# Patient Record
Sex: FEMALE | Race: WHITE | Employment: UNEMPLOYED | URBAN - METROPOLITAN AREA
[De-identification: names, ages, dates, MRNs, and addresses within clinical notes are randomized per-mention and may not be internally consistent; named-entity substitution may affect disease eponyms.]

---
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
## 2017-03-09 ENCOUNTER — HOSPITAL ENCOUNTER (EMERGENCY)
Age: 52
Discharge: HOME | End: 2017-03-10
Attending: EMERGENCY MEDICINE
Payer: SELF-PAY

## 2017-03-09 DIAGNOSIS — M25.512 PAIN IN JOINT OF LEFT SHOULDER: ICD-10-CM

## 2017-03-09 DIAGNOSIS — S40.012A CONTUSION OF LEFT SHOULDER, INITIAL ENCOUNTER: Primary | ICD-10-CM

## 2017-03-09 PROCEDURE — 99283 EMERGENCY DEPT VISIT LOW MDM: CPT

## 2017-03-10 ENCOUNTER — APPOINTMENT (OUTPATIENT)
Dept: GENERAL RADIOLOGY | Age: 52
End: 2017-03-10
Attending: EMERGENCY MEDICINE
Payer: SELF-PAY

## 2017-03-10 ENCOUNTER — APPOINTMENT (OUTPATIENT)
Dept: GENERAL RADIOLOGY | Age: 52
End: 2017-03-10
Attending: PHYSICIAN ASSISTANT
Payer: SELF-PAY

## 2017-03-10 VITALS
SYSTOLIC BLOOD PRESSURE: 104 MMHG | DIASTOLIC BLOOD PRESSURE: 73 MMHG | OXYGEN SATURATION: 100 % | HEIGHT: 70 IN | WEIGHT: 172.62 LBS | RESPIRATION RATE: 17 BRPM | TEMPERATURE: 97.3 F | HEART RATE: 84 BPM | BODY MASS INDEX: 24.71 KG/M2

## 2017-03-10 PROCEDURE — 72050 X-RAY EXAM NECK SPINE 4/5VWS: CPT

## 2017-03-10 PROCEDURE — 73030 X-RAY EXAM OF SHOULDER: CPT

## 2017-03-10 PROCEDURE — 74011250637 HC RX REV CODE- 250/637: Performed by: EMERGENCY MEDICINE

## 2017-03-10 RX ORDER — OXYCODONE AND ACETAMINOPHEN 5; 325 MG/1; MG/1
2 TABLET ORAL
Status: COMPLETED | OUTPATIENT
Start: 2017-03-10 | End: 2017-03-10

## 2017-03-10 RX ORDER — OXYCODONE AND ACETAMINOPHEN 5; 325 MG/1; MG/1
TABLET ORAL
Status: DISCONTINUED
Start: 2017-03-10 | End: 2017-03-10 | Stop reason: HOSPADM

## 2017-03-10 RX ORDER — DIAZEPAM 5 MG/1
5 TABLET ORAL
Qty: 5 TAB | Refills: 0 | Status: SHIPPED | OUTPATIENT
Start: 2017-03-10 | End: 2019-01-18 | Stop reason: ALTCHOICE

## 2017-03-10 RX ORDER — IBUPROFEN 600 MG/1
600 TABLET ORAL
Qty: 30 TAB | Refills: 0 | Status: SHIPPED | OUTPATIENT
Start: 2017-03-10 | End: 2019-01-18 | Stop reason: ALTCHOICE

## 2017-03-10 RX ORDER — DIAZEPAM 5 MG/1
5 TABLET ORAL
Status: COMPLETED | OUTPATIENT
Start: 2017-03-10 | End: 2017-03-10

## 2017-03-10 RX ORDER — OXYCODONE AND ACETAMINOPHEN 5; 325 MG/1; MG/1
1 TABLET ORAL
Qty: 12 TAB | Refills: 0 | Status: SHIPPED | OUTPATIENT
Start: 2017-03-10 | End: 2017-03-23

## 2017-03-10 RX ADMIN — OXYCODONE HYDROCHLORIDE AND ACETAMINOPHEN 2 TABLET: 5; 325 TABLET ORAL at 01:15

## 2017-03-10 RX ADMIN — DIAZEPAM 5 MG: 5 TABLET ORAL at 01:34

## 2017-03-10 NOTE — DISCHARGE INSTRUCTIONS
Joint Pain: Care Instructions  Your Care Instructions  Many people have small aches and pains from overuse or injury to muscles and joints. Joint injuries often happen during sports or recreation, work tasks, or projects around the home. An overuse injury can happen when you put too much stress on a joint or when you do an activity that stresses the joint over and over, such as using the computer or rowing a boat. You can take action at home to help your muscles and joints get better. You should feel better in 1 to 2 weeks, but it can take 3 months or more to heal completely. Follow-up care is a key part of your treatment and safety. Be sure to make and go to all appointments, and call your doctor if you are having problems. It's also a good idea to know your test results and keep a list of the medicines you take. How can you care for yourself at home? · Do not put weight on the injured joint for at least a day or two. · For the first day or two after an injury, do not take hot showers or baths, and do not use hot packs. The heat could make swelling worse. · Put ice or a cold pack on the sore joint for 10 to 20 minutes at a time. Try to do this every 1 to 2 hours for the next 3 days (when you are awake) or until the swelling goes down. Put a thin cloth between the ice and your skin. · Wrap the injury in an elastic bandage. Do not wrap it too tightly because this can cause more swelling. · Prop up the sore joint on a pillow when you ice it or anytime you sit or lie down during the next 3 days. Try to keep it above the level of your heart. This will help reduce swelling. · Take an over-the-counter pain medicine, such as acetaminophen (Tylenol), ibuprofen (Advil, Motrin), or naproxen (Aleve). Read and follow all instructions on the label. · After 1 or 2 days of rest, begin moving the joint gently.  While the joint is still healing, you can begin to exercise using activities that do not strain or hurt the painful joint. When should you call for help? Call your doctor now or seek immediate medical care if:  · You have signs of infection, such as:  ¨ Increased pain, swelling, warmth, and redness. ¨ Red streaks leading from the joint. ¨ A fever. Watch closely for changes in your health, and be sure to contact your doctor if:  · Your movement or symptoms are not getting better after 1 to 2 weeks of home treatment. Where can you learn more? Go to http://charley-andres.info/. Enter P205 in the search box to learn more about \"Joint Pain: Care Instructions. \"  Current as of: May 23, 2016  Content Version: 11.1  © 6892-5193 [employer]. Care instructions adapted under license by [employer] (which disclaims liability or warranty for this information). If you have questions about a medical condition or this instruction, always ask your healthcare professional. [place] [address] any warranty or liability for your use of this information. Musculoskeletal Pain: Care Instructions  Your Care Instructions  Different problems with the bones, muscles, nerves, ligaments, and tendons in the body can cause pain. One or more areas of your body may ache or burn. Or they may feel tired, stiff, or sore. The medical term for this type of pain is musculoskeletal pain. It can have many different causes. Sometimes the pain is caused by an injury such as a strain or sprain. Or you might have pain from using one part of your body in the same way over and over again. This is called overuse. In some cases, the cause of the pain is another health problem such as arthritis or fibromyalgia. The doctor will examine you and ask you questions about your health to help find the cause of your pain. Blood tests or imaging tests like an X-ray may also be helpful. But sometimes doctors can't find a cause of the pain.   Treatment depends on your symptoms and the cause of the pain, if known.  The doctor has checked you carefully, but problems can develop later. If you notice any problems or new symptoms, get medical treatment right away. Follow-up care is a key part of your treatment and safety. Be sure to make and go to all appointments, and call your doctor if you are having problems. It's also a good idea to know your test results and keep a list of the medicines you take. How can you care for yourself at home? · Rest until you feel better. · Do not do anything that makes the pain worse. Return to exercise gradually if you feel better and your doctor says it's okay. · Be safe with medicines. Read and follow all instructions on the label. ¨ If the doctor gave you a prescription medicine for pain, take it as prescribed. ¨ If you are not taking a prescription pain medicine, ask your doctor if you can take an over-the-counter medicine. · Put ice or a cold pack on the area for 10 to 20 minutes at a time to ease pain. Put a thin cloth between the ice and your skin. When should you call for help? Call your doctor now or seek immediate medical care if:  · You have new pain, or your pain gets worse. · You have new symptoms such as a fever, a rash, or chills. Watch closely for changes in your health, and be sure to contact your doctor if:  · You do not get better as expected. Where can you learn more? Go to http://charley-andres.info/. Enter P472 in the search box to learn more about \"Musculoskeletal Pain: Care Instructions. \"  Current as of: February 19, 2016  Content Version: 11.1  © 3797-8858 [employer]. Care instructions adapted under license by [employer] (which disclaims liability or warranty for this information). If you have questions about a medical condition or this instruction, always ask your healthcare professional. [address] any warranty or liability for your use of this information.

## 2017-03-10 NOTE — ED NOTES
Pt given discharge instructions and prescripitions by Dr. Prince . Pt able to verbalize understanding of these instructions at this time. No other questions. Pt able to ambulate out of ED at this time.

## 2017-03-10 NOTE — ED PROVIDER NOTES
HPI Comments: Hayley Holguin is a 46 y.o. female who presents ambulatory to the ED c/o left shoulder pain x a couple days. She reports falling down approximately 10 stairs, hitting her left shoulder on a cinder block. She denies head injury or LOC. She notes numbness, decreased ROM, and difficulty holding objects with her left hand. She took 4 Tylenol and iced her shoulder without improvement. She is requesting pain medication in the ED. PCP: None  Soc Hx: -tobacco, -EtOH    There are no other complaints, changes or physical findings at this time. The history is provided by the patient. History reviewed. No pertinent past medical history. History reviewed. No pertinent surgical history. History reviewed. No pertinent family history. Social History     Social History    Marital status:      Spouse name: N/A    Number of children: N/A    Years of education: N/A     Occupational History    Not on file. Social History Main Topics    Smoking status: Never Smoker    Smokeless tobacco: Not on file    Alcohol use No    Drug use: Not on file    Sexual activity: Not on file     Other Topics Concern    Not on file     Social History Narrative    No narrative on file         ALLERGIES: Tramadol    Review of Systems   Constitutional: Negative for activity change, appetite change, fatigue and fever. HENT: Negative. Negative for congestion, rhinorrhea and sore throat. Respiratory: Negative. Negative for cough, shortness of breath and wheezing. Cardiovascular: Negative. Negative for chest pain and leg swelling. Gastrointestinal: Negative. Negative for abdominal distention, abdominal pain, constipation, diarrhea, nausea and vomiting. Endocrine: Negative. Genitourinary: Negative for difficulty urinating, dysuria, menstrual problem, vaginal bleeding and vaginal discharge. Musculoskeletal: Positive for arthralgias. Negative for joint swelling and myalgias. +decreased ROM left shoulder   Skin: Negative. Negative for rash. Neurological: Positive for numbness. Negative for dizziness, weakness, light-headedness and headaches. Psychiatric/Behavioral: Negative. All other systems reviewed and are negative. Vitals:    03/09/17 2251 03/10/17 0212   BP: 112/61 104/73   Pulse: 84    Resp: 17    Temp: 97.3 °F (36.3 °C)    SpO2: 100%    Weight: 78.3 kg (172 lb 9.9 oz)    Height: 5' 10\" (1.778 m)             Physical Exam   Constitutional: She is oriented to person, place, and time. She appears well-developed and well-nourished. HENT:   Head: Atraumatic. Eyes: EOM are normal.   Cardiovascular: Normal rate, regular rhythm, normal heart sounds and intact distal pulses. Exam reveals no gallop and no friction rub. No murmur heard. Pulmonary/Chest: Effort normal and breath sounds normal. No respiratory distress. She has no wheezes. She has no rales. She exhibits no tenderness. Abdominal: Soft. Bowel sounds are normal. She exhibits no distension and no mass. There is no tenderness. There is no rebound and no guarding. Musculoskeletal: Normal range of motion. She exhibits tenderness. She exhibits no edema. Tender anterior posterior left shoulder, extending to proximal humerus. No deformity, no bruising, no swelling, no abrasions. Full ROM at wrist and elbow. Painful active passive ROM at shoulder. Left rhomboid mildly tender. No left clavicle tenderness or deformity. Neurological: She is oriented to person, place, and time. Skin: Skin is warm. Psychiatric: She has a normal mood and affect. Nursing note and vitals reviewed.        MDM  Number of Diagnoses or Management Options  Contusion of left shoulder, initial encounter:   Pain in joint of left shoulder:   Diagnosis management comments: DDx: fall, contusion, dislocation, fracture, muscle strain, muscle sprain       Amount and/or Complexity of Data Reviewed  Tests in the radiology section of CPT®: ordered and reviewed  Review and summarize past medical records: yes  Independent visualization of images, tracings, or specimens: yes      ED Course       Procedures    IMAGING RESULTS:  XR SHOULDER LT AP/LAT MIN 2 V   Final Result   EXAM: CR shoulder min 2 views left     INDICATION: Left shoulder pain after fall down stairs today.     COMPARISON: None.     TECHNIQUE: 3 views left shoulder.     FINDINGS: There is no acute fracture or dislocation. The soft tissues are  unremarkable.     IMPRESSION  IMPRESSION: No acute abnormality. XR SPINE CERV 4 OR 5 V   Final Result   EXAM: XR SPINE CERV 4 OR 5 V     INDICATION: Left extremity weakness after fall down stairs.     COMPARISON: None.     TECHNIQUE: 5 views cervical spine.     FINDINGS: Vertebral body heights and intervertebral disc spaces are maintained. There is no abnormality in alignment. The C1-2 relationship is within normal  limits. There is mild right foraminal narrowing at C3-4 and C5-6 from  uncovertebral hypertrophy. The left neural foramen are patent. The prevertebral  soft tissues are unremarkable.     IMPRESSION  IMPRESSION:   No acute abnormality. Mild degenerative change resulting in right neural  foraminal narrowing at C3-4 and C5-6. MEDICATIONS GIVEN:  Medications   oxyCODONE-acetaminophen (PERCOCET) 5-325 mg per tablet 2 Tab ( Oral Canceled Entry 3/10/17 0121)   diazePAM (VALIUM) tablet 5 mg (5 mg Oral Given 3/10/17 0134)       IMPRESSION:  1. Contusion of left shoulder, initial encounter    2. Pain in joint of left shoulder        PLAN:  1. Discharge Medication List as of 3/10/2017  3:00 AM      START taking these medications    Details   oxyCODONE-acetaminophen (PERCOCET) 5-325 mg per tablet Take 1 Tab by mouth every six (6) hours as needed for Pain. Max Daily Amount: 4 Tabs., Print, Disp-12 Tab, R-0      diazePAM (VALIUM) 5 mg tablet Take 1 Tab by mouth nightly as needed (spasm).  Max Daily Amount: 5 mg., Print, Disp-5 Tab, R-0 ibuprofen (MOTRIN) 600 mg tablet Take 1 Tab by mouth every eight (8) hours as needed for Pain., Print, Disp-30 Tab, R-0           2. Follow-up Information     Follow up With Details Comments Contact Info    Griffin Galvez MD Schedule an appointment as soon as possible for a visit in 3 days As needed if pain does not improve [address]  [place]  [phone]          Return to ED if worse     DISCHARGE NOTE  3:42 AM  The patient has been re-evaluated and is ready for discharge. Reviewed available results, diagnosis, and discharge instructions with patient. Pt has conveyed understanding and agreement with the diagnosis and plan. Pt agrees to F/U as recommended, or return to the ED if their sxs worsen. Written by Jewel Lee, ED Scribe, as dictated by Robert Flynn MD.    This note is prepared by Jewel Lee acting as Scribe for Robert Flynn MD.    Robert Flynn MD: The scribe's documentation has been prepared under my direction and personally reviewed by me in its entirety. I confirm that the note above accurately reflects all work, treatment, procedures, and medical decision making performed by me.

## 2017-04-09 ENCOUNTER — APPOINTMENT (OUTPATIENT)
Dept: GENERAL RADIOLOGY | Age: 52
End: 2017-04-09
Attending: PHYSICIAN ASSISTANT
Payer: SELF-PAY

## 2017-04-09 ENCOUNTER — HOSPITAL ENCOUNTER (EMERGENCY)
Age: 52
Discharge: HOME | End: 2017-04-09
Attending: EMERGENCY MEDICINE
Payer: SELF-PAY

## 2017-04-09 VITALS
TEMPERATURE: 98 F | RESPIRATION RATE: 16 BRPM | HEART RATE: 72 BPM | DIASTOLIC BLOOD PRESSURE: 64 MMHG | HEIGHT: 70 IN | WEIGHT: 160 LBS | OXYGEN SATURATION: 100 % | SYSTOLIC BLOOD PRESSURE: 135 MMHG | BODY MASS INDEX: 22.9 KG/M2

## 2017-04-09 DIAGNOSIS — Z87.442 HISTORY OF NEPHROLITHIASIS: ICD-10-CM

## 2017-04-09 DIAGNOSIS — N30.00 ACUTE CYSTITIS WITHOUT HEMATURIA: Primary | ICD-10-CM

## 2017-04-09 DIAGNOSIS — R10.9 LEFT FLANK PAIN: ICD-10-CM

## 2017-04-09 LAB
ALBUMIN SERPL BCP-MCNC: 3.8 G/DL (ref 3.5–5)
ALBUMIN/GLOB SERPL: 0.9 {RATIO} (ref 1.1–2.2)
ALP SERPL-CCNC: 68 U/L (ref 45–117)
ALT SERPL-CCNC: 16 U/L (ref 12–78)
ANION GAP BLD CALC-SCNC: 11 MMOL/L (ref 5–15)
APPEARANCE UR: ABNORMAL
AST SERPL W P-5'-P-CCNC: 11 U/L (ref 15–37)
BACTERIA URNS QL MICRO: NEGATIVE /HPF
BASOPHILS # BLD AUTO: 0 K/UL (ref 0–0.1)
BASOPHILS # BLD: 0 % (ref 0–1)
BILIRUB SERPL-MCNC: 0.6 MG/DL (ref 0.2–1)
BILIRUB UR QL: NEGATIVE
BUN SERPL-MCNC: 11 MG/DL (ref 6–20)
BUN/CREAT SERPL: 15 (ref 12–20)
CALCIUM SERPL-MCNC: 9 MG/DL (ref 8.5–10.1)
CHLORIDE SERPL-SCNC: 105 MMOL/L (ref 97–108)
CO2 SERPL-SCNC: 26 MMOL/L (ref 21–32)
COLOR UR: ABNORMAL
CREAT SERPL-MCNC: 0.75 MG/DL (ref 0.55–1.02)
EOSINOPHIL # BLD: 0.2 K/UL (ref 0–0.4)
EOSINOPHIL NFR BLD: 3 % (ref 0–7)
EPITH CASTS URNS QL MICRO: ABNORMAL /LPF
ERYTHROCYTE [DISTWIDTH] IN BLOOD BY AUTOMATED COUNT: 12.7 % (ref 11.5–14.5)
GLOBULIN SER CALC-MCNC: 4.2 G/DL (ref 2–4)
GLUCOSE SERPL-MCNC: 124 MG/DL (ref 65–100)
GLUCOSE UR STRIP.AUTO-MCNC: NEGATIVE MG/DL
HCT VFR BLD AUTO: 35.9 % (ref 35–47)
HGB BLD-MCNC: 12.3 G/DL (ref 11.5–16)
HGB UR QL STRIP: NEGATIVE
HYALINE CASTS URNS QL MICRO: ABNORMAL /LPF (ref 0–5)
KETONES UR QL STRIP.AUTO: NEGATIVE MG/DL
LEUKOCYTE ESTERASE UR QL STRIP.AUTO: ABNORMAL
LIPASE SERPL-CCNC: 107 U/L (ref 73–393)
LYMPHOCYTES # BLD AUTO: 35 % (ref 12–49)
LYMPHOCYTES # BLD: 2.4 K/UL (ref 0.8–3.5)
MCH RBC QN AUTO: 28.1 PG (ref 26–34)
MCHC RBC AUTO-ENTMCNC: 34.3 G/DL (ref 30–36.5)
MCV RBC AUTO: 82 FL (ref 80–99)
MONOCYTES # BLD: 0.5 K/UL (ref 0–1)
MONOCYTES NFR BLD AUTO: 7 % (ref 5–13)
MUCOUS THREADS URNS QL MICRO: ABNORMAL /LPF
NEUTS SEG # BLD: 3.7 K/UL (ref 1.8–8)
NEUTS SEG NFR BLD AUTO: 55 % (ref 32–75)
NITRITE UR QL STRIP.AUTO: NEGATIVE
PH UR STRIP: 5.5 [PH] (ref 5–8)
PLATELET # BLD AUTO: 253 K/UL (ref 150–400)
POTASSIUM SERPL-SCNC: 3.4 MMOL/L (ref 3.5–5.1)
PROT SERPL-MCNC: 8 G/DL (ref 6.4–8.2)
PROT UR STRIP-MCNC: NEGATIVE MG/DL
RBC # BLD AUTO: 4.38 M/UL (ref 3.8–5.2)
RBC #/AREA URNS HPF: ABNORMAL /HPF (ref 0–5)
SODIUM SERPL-SCNC: 142 MMOL/L (ref 136–145)
SP GR UR REFRACTOMETRY: 1.02 (ref 1–1.03)
UA: UC IF INDICATED,UAUC: ABNORMAL
UROBILINOGEN UR QL STRIP.AUTO: 0.2 EU/DL (ref 0.2–1)
WBC # BLD AUTO: 6.8 K/UL (ref 3.6–11)
WBC URNS QL MICRO: ABNORMAL /HPF (ref 0–4)

## 2017-04-09 PROCEDURE — 83690 ASSAY OF LIPASE: CPT | Performed by: PHYSICIAN ASSISTANT

## 2017-04-09 PROCEDURE — 85025 COMPLETE CBC W/AUTO DIFF WBC: CPT | Performed by: PHYSICIAN ASSISTANT

## 2017-04-09 PROCEDURE — 80053 COMPREHEN METABOLIC PANEL: CPT | Performed by: PHYSICIAN ASSISTANT

## 2017-04-09 PROCEDURE — 81001 URINALYSIS AUTO W/SCOPE: CPT | Performed by: PHYSICIAN ASSISTANT

## 2017-04-09 PROCEDURE — 99282 EMERGENCY DEPT VISIT SF MDM: CPT

## 2017-04-09 PROCEDURE — 36415 COLL VENOUS BLD VENIPUNCTURE: CPT | Performed by: PHYSICIAN ASSISTANT

## 2017-04-09 PROCEDURE — 96374 THER/PROPH/DIAG INJ IV PUSH: CPT

## 2017-04-09 PROCEDURE — 87086 URINE CULTURE/COLONY COUNT: CPT | Performed by: PHYSICIAN ASSISTANT

## 2017-04-09 PROCEDURE — 74011250636 HC RX REV CODE- 250/636: Performed by: PHYSICIAN ASSISTANT

## 2017-04-09 PROCEDURE — 74000 XR ABD (KUB): CPT

## 2017-04-09 PROCEDURE — 96375 TX/PRO/DX INJ NEW DRUG ADDON: CPT

## 2017-04-09 RX ORDER — ONDANSETRON 2 MG/ML
4 INJECTION INTRAMUSCULAR; INTRAVENOUS
Status: COMPLETED | OUTPATIENT
Start: 2017-04-09 | End: 2017-04-09

## 2017-04-09 RX ORDER — MORPHINE SULFATE 2 MG/ML
4 INJECTION, SOLUTION INTRAMUSCULAR; INTRAVENOUS
Status: COMPLETED | OUTPATIENT
Start: 2017-04-09 | End: 2017-04-09

## 2017-04-09 RX ORDER — KETOROLAC TROMETHAMINE 30 MG/ML
30 INJECTION, SOLUTION INTRAMUSCULAR; INTRAVENOUS
Status: COMPLETED | OUTPATIENT
Start: 2017-04-09 | End: 2017-04-09

## 2017-04-09 RX ORDER — SODIUM CHLORIDE 0.9 % (FLUSH) 0.9 %
5-10 SYRINGE (ML) INJECTION AS NEEDED
Status: DISCONTINUED | OUTPATIENT
Start: 2017-04-09 | End: 2017-04-10 | Stop reason: HOSPADM

## 2017-04-09 RX ORDER — CEPHALEXIN 500 MG/1
500 CAPSULE ORAL 3 TIMES DAILY
Qty: 21 CAP | Refills: 0 | Status: SHIPPED | OUTPATIENT
Start: 2017-04-09 | End: 2017-04-16

## 2017-04-09 RX ORDER — HYDROCODONE BITARTRATE AND ACETAMINOPHEN 5; 325 MG/1; MG/1
1 TABLET ORAL
Qty: 10 TAB | Refills: 0 | Status: SHIPPED | OUTPATIENT
Start: 2017-04-09 | End: 2017-04-25

## 2017-04-09 RX ORDER — SODIUM CHLORIDE 0.9 % (FLUSH) 0.9 %
5-10 SYRINGE (ML) INJECTION EVERY 8 HOURS
Status: DISCONTINUED | OUTPATIENT
Start: 2017-04-09 | End: 2017-04-10 | Stop reason: HOSPADM

## 2017-04-09 RX ORDER — ONDANSETRON 2 MG/ML
4 INJECTION INTRAMUSCULAR; INTRAVENOUS
Status: DISCONTINUED | OUTPATIENT
Start: 2017-04-09 | End: 2017-04-10 | Stop reason: HOSPADM

## 2017-04-09 RX ADMIN — KETOROLAC TROMETHAMINE 30 MG: 30 INJECTION, SOLUTION INTRAMUSCULAR at 20:12

## 2017-04-09 RX ADMIN — ONDANSETRON HYDROCHLORIDE 4 MG: 2 INJECTION, SOLUTION INTRAMUSCULAR; INTRAVENOUS at 20:12

## 2017-04-09 RX ADMIN — Medication 4 MG: at 20:12

## 2017-04-10 NOTE — DISCHARGE INSTRUCTIONS
Urinary Tract Infection in Women: Care Instructions  Your Care Instructions    A urinary tract infection, or UTI, is a general term for an infection anywhere between the kidneys and the urethra (where urine comes out). Most UTIs are bladder infections. They often cause pain or burning when you urinate. UTIs are caused by bacteria and can be cured with antibiotics. Be sure to complete your treatment so that the infection goes away. Follow-up care is a key part of your treatment and safety. Be sure to make and go to all appointments, and call your doctor if you are having problems. It's also a good idea to know your test results and keep a list of the medicines you take. How can you care for yourself at home? · Take your antibiotics as directed. Do not stop taking them just because you feel better. You need to take the full course of antibiotics. · Drink extra water and other fluids for the next day or two. This may help wash out the bacteria that are causing the infection. (If you have kidney, heart, or liver disease and have to limit fluids, talk with your doctor before you increase your fluid intake.)  · Avoid drinks that are carbonated or have caffeine. They can irritate the bladder. · Urinate often. Try to empty your bladder each time. · To relieve pain, take a hot bath or lay a heating pad set on low over your lower belly or genital area. Never go to sleep with a heating pad in place. To prevent UTIs  · Drink plenty of water each day. This helps you urinate often, which clears bacteria from your system. (If you have kidney, heart, or liver disease and have to limit fluids, talk with your doctor before you increase your fluid intake.)  · Urinate when you need to. · Urinate right after you have sex. · Change sanitary pads often. · Avoid douches, bubble baths, feminine hygiene sprays, and other feminine hygiene products that have deodorants.   · After going to the bathroom, wipe from front to back.  When should you call for help? Call your doctor now or seek immediate medical care if:  · Symptoms such as fever, chills, nausea, or vomiting get worse or appear for the first time. · You have new pain in your back just below your rib cage. This is called flank pain. · There is new blood or pus in your urine. · You have any problems with your antibiotic medicine. Watch closely for changes in your health, and be sure to contact your doctor if:  · You are not getting better after taking an antibiotic for 2 days. · Your symptoms go away but then come back. Where can you learn more? Go to http://charley-andres.info/. Enter R241 in the search box to learn more about \"Urinary Tract Infection in Women: Care Instructions. \"  Current as of: November 28, 2016  Content Version: 11.2  © 6334-0671 [employer]. Care instructions adapted under license by [email or web address] (which disclaims liability or warranty for this information). If you have questions about a medical condition or this instruction, always ask your healthcare professional. [address] any warranty or liability for your use of this information.

## 2017-04-10 NOTE — ED PROVIDER NOTES
HPI Comments: Nitza Mclean, 46 y.o. Female with PMHx of recurrent kidney stones and ovarian cyst presents ambulatory to the ED with cc of severe left flank pain radiating down to left groin x 2 days. She also c/o associated nausea, chills, decreased appetite, and dysuria. She took 2 Motrin 800 mg at 11 AM with mild relief. Pt notes that she has 4 young children and initially thought that she had pulled a muscle or strained her back. Of note the pt had a CT abd/pelvis performed on 3/23/17 that showed endometrial hyperplasia and non obstructing stones in bilateral kidneys. Pt reports Tramadol allergy. She denies any fevers, chills, vomiting, diarrhea, hematuria, CP, SOB, vaginal discharge or vaginal bleeding. PCP: None    Social history significant for: - Tobacco, - EtOH, - Illicit Drug Use    There are no other complaints, changes, or physical findings at this time. Written by JAH Deras, as dictated by Jaquelin Carter PA-C. The history is provided by the patient. No  was used. No past medical history on file. No past surgical history on file. No family history on file. Social History     Social History    Marital status:      Spouse name: N/A    Number of children: N/A    Years of education: N/A     Occupational History    Not on file. Social History Main Topics    Smoking status: Never Smoker    Smokeless tobacco: Not on file    Alcohol use No    Drug use: Not on file    Sexual activity: Not on file     Other Topics Concern    Not on file     Social History Narrative         ALLERGIES: Tramadol    Review of Systems   Constitutional: Positive for appetite change (decrease) and chills. Negative for fever. HENT: Negative. Negative for rhinorrhea and sore throat. Eyes: Negative. Negative for visual disturbance. Respiratory: Negative. Negative for cough, chest tightness, shortness of breath and wheezing.     Cardiovascular: Negative. Negative for chest pain and palpitations. Gastrointestinal: Positive for abdominal pain. Negative for constipation, diarrhea, nausea and vomiting. Genitourinary: Positive for dysuria and flank pain. Negative for hematuria. Musculoskeletal: Negative for arthralgias and myalgias. Skin: Negative. Negative for rash. Allergic/Immunologic: Negative. Negative for environmental allergies and food allergies. Neurological: Negative. Negative for headaches. Psychiatric/Behavioral: Negative. Negative for suicidal ideas. Patient Vitals for the past 12 hrs:   Temp Pulse Resp BP SpO2   04/09/17 1938 98 °F (36.7 °C) 72 16 135/64 100 %       Physical Exam   Constitutional: She is oriented to person, place, and time. She appears well-developed and well-nourished. No distress. Pt appears well, awake and alert in NAD. HENT:   Head: Normocephalic and atraumatic. Right Ear: Tympanic membrane, external ear and ear canal normal.   Left Ear: Tympanic membrane, external ear and ear canal normal.   Nose: Nose normal.   Mouth/Throat: Uvula is midline, oropharynx is clear and moist and mucous membranes are normal. Abnormal dentition. No oropharyngeal exudate, posterior oropharyngeal edema or posterior oropharyngeal erythema. Severe diffuse dental decay   Eyes: Conjunctivae and EOM are normal. Pupils are equal, round, and reactive to light. Right eye exhibits no discharge. Left eye exhibits no discharge. Neck: Normal range of motion. Cardiovascular: Normal rate, normal heart sounds and intact distal pulses. Pulmonary/Chest: Effort normal and breath sounds normal. No respiratory distress. She has no wheezes. She has no rales. She exhibits no tenderness. Abdominal: Soft. Bowel sounds are normal. She exhibits no distension. There is tenderness (LLQ). There is no rebound and no guarding. L CVA tenderness   Musculoskeletal: Normal range of motion. She exhibits no edema or tenderness. Lymphadenopathy:     She has no cervical adenopathy. Neurological: She is alert and oriented to person, place, and time. No cranial nerve deficit. Coordination normal.   No focal neuro deficits. Skin: Skin is warm and dry. No rash noted. She is not diaphoretic. No erythema. No pallor. Psychiatric: She has a normal mood and affect. Her behavior is normal.   Nursing note and vitals reviewed. MDM  Number of Diagnoses or Management Options  Acute cystitis without hematuria:   History of nephrolithiasis:   Left flank pain:   Diagnosis management comments: DDx: UTI, pyelonephritis, nephrolithiasis, ovarian cyst    Patient with recent CT, afebrile, WBC wnl. No hematuria. No peritoneal findings on PE. Advise for follow up with OB/GYN and urology. Amount and/or Complexity of Data Reviewed  Clinical lab tests: ordered and reviewed  Tests in the radiology section of CPT®: ordered and reviewed  Discussion of test results with the performing providers: yes (Attending)  Review and summarize past medical records: yes  Discuss the patient with other providers: yes (Attending)    Patient Progress  Patient progress: stable    ED Course       Procedures    8:25 PM   Discussed the pt with Dr. Radha Phelps, he does not recommend ordering another CT abd/pelv at this time as patient just had CT on 3/23, WBC wnl and afebrile. Advises for follow up with urology and gyn. Written by JAH Delgado, as dictated by Cassie Kelsey PA-C.     9:10 PM  KUB was unremarkable. Will d/c home. Written by JAH Delgado, as dictated by Cassie Kelsey PA-C.    9:13 PM   reviewed. 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 She has had different 3 providers fill prescriptions at 3 different pharmacies. In March, she had 22 Percocet dispensed and on 4/5 she had 10 Percocet dispensed. Will prescribe short course until patient can follow up.  Educated patient to take ibuprofen as prescribed as taking more than directed may lead to GI bleed. Written by Verónica Winters ED Scribe, as dictated by Vitor Wayne PA-C.    9:19PM  Provider re-evaluated pt. Patient appears well, daughter at bedside. Provider discussed all available diagnostics, diagnosis, and treatment plan. Thoroughly discussed worrisome signs/symptoms in which pt should immediately return to ED, otherwise follow up with urology and gyn. Patient conveys understanding and agreement to all of the above. All patient's questions were answered by provider. MATHIEU Leon    LABORATORY TESTS:  Recent Results (from the past 12 hour(s))   URINALYSIS W/ REFLEX CULTURE    Collection Time: 04/09/17  8:01 PM   Result Value Ref Range    Color YELLOW/STRAW      Appearance CLOUDY (A) CLEAR      Specific gravity 1.017 1.003 - 1.030      pH (UA) 5.5 5.0 - 8.0      Protein NEGATIVE  NEG mg/dL    Glucose NEGATIVE  NEG mg/dL    Ketone NEGATIVE  NEG mg/dL    Bilirubin NEGATIVE  NEG      Blood NEGATIVE  NEG      Urobilinogen 0.2 0.2 - 1.0 EU/dL    Nitrites NEGATIVE  NEG      Leukocyte Esterase SMALL (A) NEG      WBC 10-20 0 - 4 /hpf    RBC 0-5 0 - 5 /hpf    Epithelial cells MODERATE (A) FEW /lpf    Bacteria NEGATIVE  NEG /hpf    UA:UC IF INDICATED URINE CULTURE ORDERED (A) CNI      Mucus 1+ (A) NEG /lpf    Hyaline cast 0-2 0 - 5 /lpf   CBC WITH AUTOMATED DIFF    Collection Time: 04/09/17  8:01 PM   Result Value Ref Range    WBC 6.8 3.6 - 11.0 K/uL    RBC 4.38 3.80 - 5.20 M/uL    HGB 12.3 11.5 - 16.0 g/dL    HCT 35.9 35.0 - 47.0 %    MCV 82.0 80.0 - 99.0 FL    MCH 28.1 26.0 - 34.0 PG    MCHC 34.3 30.0 - 36.5 g/dL    RDW 12.7 11.5 - 14.5 %    PLATELET 150 396 - 904 K/uL    NEUTROPHILS 55 32 - 75 %    LYMPHOCYTES 35 12 - 49 %    MONOCYTES 7 5 - 13 %    EOSINOPHILS 3 0 - 7 %    BASOPHILS 0 0 - 1 %    ABS. NEUTROPHILS 3.7 1.8 - 8.0 K/UL    ABS. LYMPHOCYTES 2.4 0.8 - 3.5 K/UL    ABS. MONOCYTES 0.5 0.0 - 1.0 K/UL    ABS. EOSINOPHILS 0.2 0.0 - 0.4 K/UL    ABS.  BASOPHILS 0.0 0.0 - 0.1 K/UL   METABOLIC PANEL, COMPREHENSIVE    Collection Time: 04/09/17  8:01 PM   Result Value Ref Range    Sodium 142 136 - 145 mmol/L    Potassium 3.4 (L) 3.5 - 5.1 mmol/L    Chloride 105 97 - 108 mmol/L    CO2 26 21 - 32 mmol/L    Anion gap 11 5 - 15 mmol/L    Glucose 124 (H) 65 - 100 mg/dL    BUN 11 6 - 20 MG/DL    Creatinine 0.75 0.55 - 1.02 MG/DL    BUN/Creatinine ratio 15 12 - 20      GFR est AA >60 >60 ml/min/1.73m2    GFR est non-AA >60 >60 ml/min/1.73m2    Calcium 9.0 8.5 - 10.1 MG/DL    Bilirubin, total 0.6 0.2 - 1.0 MG/DL    ALT (SGPT) 16 12 - 78 U/L    AST (SGOT) 11 (L) 15 - 37 U/L    Alk. phosphatase 68 45 - 117 U/L    Protein, total 8.0 6.4 - 8.2 g/dL    Albumin 3.8 3.5 - 5.0 g/dL    Globulin 4.2 (H) 2.0 - 4.0 g/dL    A-G Ratio 0.9 (L) 1.1 - 2.2     LIPASE    Collection Time: 04/09/17  8:01 PM   Result Value Ref Range    Lipase 107 73 - 393 U/L       IMAGING RESULTS:  XR ABD (KUB)   Final Result   INDICATION: Left flank pain for 2 days.     Exam: Supine and upright views of the abdomen.     No prior CT for direct comparison.     FINDINGS: There is a nonspecific bowel gas pattern. No dilated loop of bowel or  air-fluid level is visualized. Soft tissue detail is normal. No free air is  demonstrated. There are no unusual calcifications.     IMPRESSION  IMPRESSION: Nonspecific bowel gas pattern. No evidence of perforation. MEDICATIONS GIVEN:  Medications   sodium chloride (NS) flush 5-10 mL (not administered)   sodium chloride (NS) flush 5-10 mL (not administered)   morphine injection 4 mg (4 mg IntraVENous Given 4/9/17 2012)   ketorolac (TORADOL) injection 30 mg (30 mg IntraVENous Given 4/9/17 2012)   ondansetron (ZOFRAN) injection 4 mg (4 mg IntraVENous Given 4/9/17 2012)       IMPRESSION:  1. Acute cystitis without hematuria    2. History of nephrolithiasis    3. Left flank pain        PLAN:  1.    Current Discharge Medication List      START taking these medications    Details   cephALEXin (KEFLEX) 500 mg capsule Take 1 Cap by mouth three (3) times daily for 7 days. Qty: 21 Cap, Refills: 0      HYDROcodone-acetaminophen (NORCO) 5-325 mg per tablet Take 1 Tab by mouth every six (6) hours as needed for Pain. Max Daily Amount: 4 Tabs. Qty: 10 Tab, Refills: 0         STOP taking these medications       clindamycin (CLEOCIN) 300 mg capsule Comments:   Reason for Stopping:         oxyCODONE-acetaminophen (PERCOCET) 5-325 mg per tablet Comments:   Reason for Stopping:         oxyCODONE-acetaminophen (PERCOCET) 5-325 mg per tablet Comments:   Reason for Stoppin.   Follow-up Information     Follow up With Details Comments [address] [facility], [place]   [facility] [id number] [phone]          Return to ED if worse     Discharge Note:  9:19 PM  The pt is ready for discharge. The pt's signs, symptoms, diagnosis, and discharge instructions have been discussed and pt has conveyed their understanding. The pt is to follow up as recommended or return to ER should their symptoms worsen. Plan has been discussed and pt is in agreement. This note is prepared by Zena Beaulieu, acting as a Scribe for Daniel Lindsey PA-C. Daniel Lindsey PA-C: The scribe's documentation has been prepared under my direction and personally reviewed by me in its entirety. I confirm that the notes above accurately reflects all work, treatment, procedures, and medical decision making performed by me. This note will not be viewable in [address]

## 2017-04-11 LAB
BACTERIA SPEC CULT: NORMAL
CC UR VC: NORMAL
SERVICE CMNT-IMP: NORMAL

## 2017-04-25 ENCOUNTER — HOSPITAL ENCOUNTER (EMERGENCY)
Age: 52
Discharge: HOME | End: 2017-04-25
Attending: EMERGENCY MEDICINE | Admitting: EMERGENCY MEDICINE
Payer: SELF-PAY

## 2017-04-25 ENCOUNTER — APPOINTMENT (OUTPATIENT)
Dept: CT IMAGING | Age: 52
End: 2017-04-25
Attending: EMERGENCY MEDICINE
Payer: SELF-PAY

## 2017-04-25 VITALS
HEIGHT: 70 IN | HEART RATE: 73 BPM | DIASTOLIC BLOOD PRESSURE: 64 MMHG | SYSTOLIC BLOOD PRESSURE: 112 MMHG | RESPIRATION RATE: 18 BRPM | TEMPERATURE: 97.8 F | BODY MASS INDEX: 23.67 KG/M2 | WEIGHT: 165.34 LBS | OXYGEN SATURATION: 97 %

## 2017-04-25 DIAGNOSIS — R10.32 ABDOMINAL PAIN, LLQ (LEFT LOWER QUADRANT): Primary | ICD-10-CM

## 2017-04-25 DIAGNOSIS — R93.89 THICKENED ENDOMETRIUM: ICD-10-CM

## 2017-04-25 DIAGNOSIS — R93.89 ABNORMAL FINDING ON CT SCAN: ICD-10-CM

## 2017-04-25 LAB
ALBUMIN SERPL BCP-MCNC: 4 G/DL (ref 3.5–5)
ALBUMIN/GLOB SERPL: 1 {RATIO} (ref 1.1–2.2)
ALP SERPL-CCNC: 72 U/L (ref 45–117)
ALT SERPL-CCNC: 17 U/L (ref 12–78)
ANION GAP BLD CALC-SCNC: 8 MMOL/L (ref 5–15)
APPEARANCE UR: CLEAR
AST SERPL W P-5'-P-CCNC: 10 U/L (ref 15–37)
BACTERIA URNS QL MICRO: NEGATIVE /HPF
BILIRUB SERPL-MCNC: 0.7 MG/DL (ref 0.2–1)
BILIRUB UR QL: NEGATIVE
BUN SERPL-MCNC: 7 MG/DL (ref 6–20)
BUN/CREAT SERPL: 10 (ref 12–20)
CALCIUM SERPL-MCNC: 9 MG/DL (ref 8.5–10.1)
CHLORIDE SERPL-SCNC: 106 MMOL/L (ref 97–108)
CO2 SERPL-SCNC: 28 MMOL/L (ref 21–32)
COLOR UR: ABNORMAL
CREAT SERPL-MCNC: 0.69 MG/DL (ref 0.55–1.02)
EPITH CASTS URNS QL MICRO: ABNORMAL /LPF
ERYTHROCYTE [DISTWIDTH] IN BLOOD BY AUTOMATED COUNT: 13 % (ref 11.5–14.5)
GLOBULIN SER CALC-MCNC: 4.1 G/DL (ref 2–4)
GLUCOSE SERPL-MCNC: 90 MG/DL (ref 65–100)
GLUCOSE UR STRIP.AUTO-MCNC: NEGATIVE MG/DL
HCG UR QL: NEGATIVE
HCT VFR BLD AUTO: 38.3 % (ref 35–47)
HGB BLD-MCNC: 12.9 G/DL (ref 11.5–16)
HGB UR QL STRIP: NEGATIVE
HYALINE CASTS URNS QL MICRO: ABNORMAL /LPF (ref 0–5)
KETONES UR QL STRIP.AUTO: NEGATIVE MG/DL
LEUKOCYTE ESTERASE UR QL STRIP.AUTO: ABNORMAL
MCH RBC QN AUTO: 28 PG (ref 26–34)
MCHC RBC AUTO-ENTMCNC: 33.7 G/DL (ref 30–36.5)
MCV RBC AUTO: 83.3 FL (ref 80–99)
NITRITE UR QL STRIP.AUTO: NEGATIVE
PH UR STRIP: 7 [PH] (ref 5–8)
PLATELET # BLD AUTO: 233 K/UL (ref 150–400)
POTASSIUM SERPL-SCNC: 3.9 MMOL/L (ref 3.5–5.1)
PROT SERPL-MCNC: 8.1 G/DL (ref 6.4–8.2)
PROT UR STRIP-MCNC: NEGATIVE MG/DL
RBC # BLD AUTO: 4.6 M/UL (ref 3.8–5.2)
RBC #/AREA URNS HPF: ABNORMAL /HPF (ref 0–5)
SODIUM SERPL-SCNC: 142 MMOL/L (ref 136–145)
SP GR UR REFRACTOMETRY: 1.01 (ref 1–1.03)
UA: UC IF INDICATED,UAUC: ABNORMAL
UROBILINOGEN UR QL STRIP.AUTO: 0.2 EU/DL (ref 0.2–1)
WBC # BLD AUTO: 4.4 K/UL (ref 3.6–11)
WBC URNS QL MICRO: ABNORMAL /HPF (ref 0–4)

## 2017-04-25 PROCEDURE — 36415 COLL VENOUS BLD VENIPUNCTURE: CPT | Performed by: EMERGENCY MEDICINE

## 2017-04-25 PROCEDURE — 74011250637 HC RX REV CODE- 250/637: Performed by: EMERGENCY MEDICINE

## 2017-04-25 PROCEDURE — 74177 CT ABD & PELVIS W/CONTRAST: CPT

## 2017-04-25 PROCEDURE — 96361 HYDRATE IV INFUSION ADD-ON: CPT

## 2017-04-25 PROCEDURE — 81025 URINE PREGNANCY TEST: CPT

## 2017-04-25 PROCEDURE — 87086 URINE CULTURE/COLONY COUNT: CPT | Performed by: EMERGENCY MEDICINE

## 2017-04-25 PROCEDURE — 74011636320 HC RX REV CODE- 636/320: Performed by: EMERGENCY MEDICINE

## 2017-04-25 PROCEDURE — 80053 COMPREHEN METABOLIC PANEL: CPT | Performed by: EMERGENCY MEDICINE

## 2017-04-25 PROCEDURE — 87147 CULTURE TYPE IMMUNOLOGIC: CPT | Performed by: EMERGENCY MEDICINE

## 2017-04-25 PROCEDURE — 81001 URINALYSIS AUTO W/SCOPE: CPT | Performed by: EMERGENCY MEDICINE

## 2017-04-25 PROCEDURE — 96374 THER/PROPH/DIAG INJ IV PUSH: CPT

## 2017-04-25 PROCEDURE — 74011250636 HC RX REV CODE- 250/636: Performed by: EMERGENCY MEDICINE

## 2017-04-25 PROCEDURE — 85027 COMPLETE CBC AUTOMATED: CPT | Performed by: EMERGENCY MEDICINE

## 2017-04-25 PROCEDURE — 99283 EMERGENCY DEPT VISIT LOW MDM: CPT

## 2017-04-25 PROCEDURE — 96375 TX/PRO/DX INJ NEW DRUG ADDON: CPT

## 2017-04-25 RX ORDER — HYDROCODONE BITARTRATE AND ACETAMINOPHEN 7.5; 325 MG/1; MG/1
1 TABLET ORAL
Status: COMPLETED | OUTPATIENT
Start: 2017-04-25 | End: 2017-04-25

## 2017-04-25 RX ORDER — KETOROLAC TROMETHAMINE 30 MG/ML
30 INJECTION, SOLUTION INTRAMUSCULAR; INTRAVENOUS
Status: COMPLETED | OUTPATIENT
Start: 2017-04-25 | End: 2017-04-25

## 2017-04-25 RX ORDER — ONDANSETRON 2 MG/ML
4 INJECTION INTRAMUSCULAR; INTRAVENOUS
Status: COMPLETED | OUTPATIENT
Start: 2017-04-25 | End: 2017-04-25

## 2017-04-25 RX ORDER — HYDROCODONE BITARTRATE AND ACETAMINOPHEN 5; 325 MG/1; MG/1
1 TABLET ORAL
Qty: 10 TAB | Refills: 0 | Status: SHIPPED | OUTPATIENT
Start: 2017-04-25 | End: 2019-01-18 | Stop reason: ALTCHOICE

## 2017-04-25 RX ORDER — SODIUM CHLORIDE 9 MG/ML
50 INJECTION, SOLUTION INTRAVENOUS
Status: COMPLETED | OUTPATIENT
Start: 2017-04-25 | End: 2017-04-25

## 2017-04-25 RX ORDER — SODIUM CHLORIDE 0.9 % (FLUSH) 0.9 %
10 SYRINGE (ML) INJECTION
Status: COMPLETED | OUTPATIENT
Start: 2017-04-25 | End: 2017-04-25

## 2017-04-25 RX ADMIN — Medication 10 ML: at 09:24

## 2017-04-25 RX ADMIN — IOPAMIDOL 100 ML: 755 INJECTION, SOLUTION INTRAVENOUS at 09:24

## 2017-04-25 RX ADMIN — HYDROCODONE BITARTRATE AND ACETAMINOPHEN 1 TABLET: 7.5; 325 TABLET ORAL at 10:16

## 2017-04-25 RX ADMIN — ONDANSETRON HYDROCHLORIDE 4 MG: 2 INJECTION, SOLUTION INTRAMUSCULAR; INTRAVENOUS at 07:51

## 2017-04-25 RX ADMIN — SODIUM CHLORIDE 1000 ML: 900 INJECTION, SOLUTION INTRAVENOUS at 07:55

## 2017-04-25 RX ADMIN — KETOROLAC TROMETHAMINE 30 MG: 30 INJECTION, SOLUTION INTRAMUSCULAR at 07:52

## 2017-04-25 RX ADMIN — SODIUM CHLORIDE 50 ML/HR: 900 INJECTION, SOLUTION INTRAVENOUS at 09:24

## 2017-04-25 NOTE — ED PROVIDER NOTES
HPI Comments: Sara Pritchard is a 46 y.o. female with hx of kidney stones and left ovarian cyst who presents ambulatory to [id number] [place] [address] ED with CC of progressively worsening left flank pain, radiating to her LLQ, x ~2 days. Pt also reports mild \"swelling\" over her LLQ. She c/o nausea, mild difficulty urinating, and intermittent diarrhea since onset of her symptoms. Pt states she has had mild difficulty ambulating secondary to her pain. She reports taking motrin and tylenol without significant improvement in her symptoms. Pt notes she is allergic to Tramadol. She denies currently being followed by a Urologist. She specifically denies vomiting, hematuria, and increased urinary frequency. She denies alcohol, tobacco, or drug use. PCP: None    There are no other complaints, changes, or physical findings at this time. The history is provided by the patient and medical records. No past medical history on file. No past surgical history on file. No family history on file. Social History     Social History    Marital status:      Spouse name: N/A    Number of children: N/A    Years of education: N/A     Occupational History    Not on file. Social History Main Topics    Smoking status: Never Smoker    Smokeless tobacco: Not on file    Alcohol use No    Drug use: Not on file    Sexual activity: Not on file     Other Topics Concern    Not on file     Social History Narrative         ALLERGIES: Tramadol    Review of Systems   Constitutional: Negative for activity change, appetite change, fatigue and fever. HENT: Negative. Negative for congestion, rhinorrhea and sore throat. Respiratory: Negative. Negative for cough, shortness of breath and wheezing. Cardiovascular: Negative. Negative for chest pain and leg swelling. Gastrointestinal: Positive for diarrhea (intermittent) and nausea. Negative for abdominal distention, constipation and vomiting. Endocrine: Negative. Genitourinary: Positive for difficulty urinating and flank pain (left). Negative for dysuria, menstrual problem, vaginal bleeding and vaginal discharge. Musculoskeletal: Negative for arthralgias, joint swelling and myalgias. Skin: Negative. Negative for rash. Neurological: Negative. Negative for dizziness, weakness, light-headedness and headaches. Psychiatric/Behavioral: Negative. Patient Vitals for the past 12 hrs:   Temp Pulse Resp BP SpO2   04/25/17 1015 - - - 112/64 97 %   04/25/17 0653 97.8 °F (36.6 °C) 73 18 121/71 99 %            Physical Exam   Constitutional: She is oriented to person, place, and time. She appears well-developed and well-nourished. Well-appearing   HENT:   Head: Atraumatic. Poor dentition   Eyes: EOM are normal.   Cardiovascular: Normal rate, regular rhythm, normal heart sounds and intact distal pulses. Exam reveals no gallop and no friction rub. No murmur heard. Pulmonary/Chest: Effort normal and breath sounds normal. No respiratory distress. She has no wheezes. She has no rales. She exhibits no tenderness. Abdominal: Soft. Bowel sounds are normal. She exhibits no distension and no mass. There is tenderness (mild to moderate) in the left lower quadrant. There is guarding (mild). There is no rebound. Musculoskeletal: Normal range of motion. She exhibits no edema or tenderness. Neurological: She is oriented to person, place, and time. Skin: Skin is warm. Psychiatric: She has a normal mood and affect. Nursing note and vitals reviewed. MDM  Number of Diagnoses or Management Options  Abdominal pain, LLQ (left lower quadrant): Abnormal finding on CT scan:    Thickened endometrium:   Diagnosis management comments: DDx: UTI, pyelonephritis, ureteral stone, ovarian cyst, ruptured cyst       Amount and/or Complexity of Data Reviewed  Clinical lab tests: ordered and reviewed  Tests in the radiology section of CPT®: ordered and reviewed  Obtain history from someone other than the patient: yes (Records)  Review and summarize past medical records: yes  Independent visualization of images, tracings, or specimens: yes      ED Course       Procedures    10:11 AM  Pt re-evaluated; pt has abnormal finding on CT scan, which she states she has been informed of in the past. Offered pt transvaginal US here or as outpatient; pt states she would prefer to have study done outpatient. Pt states her pain improved mildly with ketorolac, but does not feel it has resolved or improved to a point she is comfortable with; will order additional dose of medication to be given at time of discharge. 10:37 AM  Pt re-evaluated, states her pain has improved, and is ready for discharge.     LABORATORY TESTS:  Recent Results (from the past 12 hour(s))   URINALYSIS W/ REFLEX CULTURE    Collection Time: 04/25/17  7:31 AM   Result Value Ref Range    Color YELLOW/STRAW      Appearance CLEAR CLEAR      Specific gravity 1.008 1.003 - 1.030      pH (UA) 7.0 5.0 - 8.0      Protein NEGATIVE  NEG mg/dL    Glucose NEGATIVE  NEG mg/dL    Ketone NEGATIVE  NEG mg/dL    Bilirubin NEGATIVE  NEG      Blood NEGATIVE  NEG      Urobilinogen 0.2 0.2 - 1.0 EU/dL    Nitrites NEGATIVE  NEG      Leukocyte Esterase MODERATE (A) NEG      WBC 5-10 0 - 4 /hpf    RBC 0-5 0 - 5 /hpf    Epithelial cells FEW FEW /lpf    Bacteria NEGATIVE  NEG /hpf    UA:UC IF INDICATED URINE CULTURE ORDERED (A) CNI      Hyaline cast 0-2 0 - 5 /lpf   CBC W/O DIFF    Collection Time: 04/25/17  7:45 AM   Result Value Ref Range    WBC 4.4 3.6 - 11.0 K/uL    RBC 4.60 3.80 - 5.20 M/uL    HGB 12.9 11.5 - 16.0 g/dL    HCT 38.3 35.0 - 47.0 %    MCV 83.3 80.0 - 99.0 FL    MCH 28.0 26.0 - 34.0 PG    MCHC 33.7 30.0 - 36.5 g/dL    RDW 13.0 11.5 - 14.5 %    PLATELET 116 205 - 142 K/uL   METABOLIC PANEL, COMPREHENSIVE    Collection Time: 04/25/17  7:45 AM   Result Value Ref Range    Sodium 142 136 - 145 mmol/L    Potassium 3.9 3.5 - 5.1 mmol/L    Chloride 106 97 - 108 mmol/L    CO2 28 21 - 32 mmol/L    Anion gap 8 5 - 15 mmol/L    Glucose 90 65 - 100 mg/dL    BUN 7 6 - 20 MG/DL    Creatinine 0.69 0.55 - 1.02 MG/DL    BUN/Creatinine ratio 10 (L) 12 - 20      GFR est AA >60 >60 ml/min/1.73m2    GFR est non-AA >60 >60 ml/min/1.73m2    Calcium 9.0 8.5 - 10.1 MG/DL    Bilirubin, total 0.7 0.2 - 1.0 MG/DL    ALT (SGPT) 17 12 - 78 U/L    AST (SGOT) 10 (L) 15 - 37 U/L    Alk. phosphatase 72 45 - 117 U/L    Protein, total 8.1 6.4 - 8.2 g/dL    Albumin 4.0 3.5 - 5.0 g/dL    Globulin 4.1 (H) 2.0 - 4.0 g/dL    A-G Ratio 1.0 (L) 1.1 - 2.2     HCG URINE, QL. - POC    Collection Time: 04/25/17  9:02 AM   Result Value Ref Range    Pregnancy test,urine (POC) NEGATIVE  NEG         IMAGING RESULTS:  CT Results  (Last 48 hours)               04/25/17 0924  CT ABD PELV W CONT Final result    Impression:  IMPRESSION:    1. Hypodense attenuation within the central uterus, measuring 3.4 cm in AP   diameter and 3.7 cm in transverse diameter, suggesting thickening of the   endometrium. Recommend dedicated ultrasound of the pelvis for further   evaluation. 2. No colonic diverticulitis. Narrative:  INDICATION: Left lower quadrant abdominal pain, started 2 days ago. CT of the abdomen and pelvis is performed with 5 mm collimation. Study is   performed with 100 cc of nonionic Isovue 370. Sagittal and coronal reformatted   images were also performed. CT dose reduction was achieved with the use of the standardized protocol   tailored for this examination and automatic exposure control for dose   modulation. There is no prior study for direct comparison. Findings:       Lung bases: The visualized lung bases are clear. Liver: There is a 9 mm hypodensity within the left hepatic lobe, too small to   further characterize. Liver is otherwise normal in appearance. Adrenals: Adrenal glands are normal.       Pancreas: The pancreas is normal.       Gallbladder:  The gallbladder is normal.       Kidneys: The kidneys are normal.       Spleen: The spleen is normal.       Lymph nodes. There is no shanti hepatitis, mesenteric, retroperitoneal or pelvic   lymphadenopathy. Bowel: No thickened or dilated loop of large or small bowel is visualized. There   is no diverticulitis. Urinary bladder: Urinary bladder is partially filled and grossly normal.       Miscellaneous: There is no free intraperitoneal fluid or gas. There is no focal   fluid collection to suggest abscess. There is hypodense attenuation within the   central uterus, measuring 3.4 cm in AP diameter and 3.7 cm in transverse   diameter, suggesting thickening of the endometrium. MEDICATIONS GIVEN:  Medications   sodium chloride 0.9 % bolus infusion 1,000 mL (1,000 mL IntraVENous New Bag 4/25/17 0755)   ondansetron (ZOFRAN) injection 4 mg (4 mg IntraVENous Given 4/25/17 0751)   ketorolac (TORADOL) injection 30 mg (30 mg IntraVENous Given 4/25/17 0752)   sodium chloride (NS) flush 10 mL (10 mL IntraVENous Given 4/25/17 0924)   iopamidol (ISOVUE-370) 76 % injection 100 mL (100 mL IntraVENous Given 4/25/17 0924)   0.9% sodium chloride infusion (50 mL/hr IntraVENous New Bag 4/25/17 0924)   HYDROcodone-acetaminophen (NORCO) 7.5-325 mg per tablet 1 Tab (1 Tab Oral Given 4/25/17 1016)       IMPRESSION:  1. Abdominal pain, LLQ (left lower quadrant)    2. Abnormal finding on CT scan    3. Thickened endometrium        PLAN:  1. Discharge for follow up with OB/Gyn    Current Discharge Medication List      CONTINUE these medications which have CHANGED    Details   HYDROcodone-acetaminophen (NORCO) 5-325 mg per tablet Take 1 Tab by mouth every six (6) hours as needed for Pain. Max Daily Amount: 4 Tabs. Qty: 10 Tab, Refills: 0           2.    Follow-up Information     Follow up With Details Comments [address] Ladonna Reid MD In 1 week  [address] and Delivery  [address] [facility] [address]  [phone]      [facility] EMERGENCY DEPT  As needed, If symptoms worsen [address] [place] [address]  [phone]        Return to ED if worse     DISCHARGE NOTE:  11:38 AM  The patient is ready for discharge. The patients signs, symptoms, diagnosis, and instructions for discharge have been discussed and the pt has conveyed their understanding. The patient is to follow up as recommended with OB/Gyn or return to the ER should their symptoms worsen. Plan has been discussed and patient has conveyed their agreement. This note is prepared by Elizabeth Seals, acting as Scribe for Connie Rosado MD.    Connie Rosado MD: The scribe's documentation has been prepared under my direction and personally reviewed by me in its entirety. I confirm that the note above accurately reflects all work, treatment, procedures, and medical decision making performed by me.

## 2017-04-25 NOTE — LETTER
4/27/2017 [address] [place] [address] [facility] [address] [address] Dear Ms. Riley Yan, You were recently seen in the Emergency Department of [facility] and had lab work performed. We would like to discuss these results with you. Please call the Emergency Department at your earliest convenience at [phone] between 10am-8pm to speak with one of our providers. Sincerely, 
 
 
Philipp Garcia, [address] 
 
 
[facility] EMERGENCY DEPT 
[address] [place] [address] 
[phone]

## 2017-04-25 NOTE — ED NOTES
Patient arrives for L lower back pain that radiates into groin that started two days ago. Patient reports a burning and stabbing pain. Patient reports difficulty starting stream with urination. Reports nausea and vomiting, denies fevers. No distress noted. Will continue to monitor.

## 2017-04-25 NOTE — ED NOTES
Dr Mandie Mason reviewed discharge instructions with the patient. The patient verbalized understanding. All questions and concerns were addressed. The patient declined a wheelchair and is discharged ambulatory in the care of family members with instructions and prescriptions in hand. Pt is alert and oriented x 4. Respirations are clear and unlabored.

## 2017-04-26 LAB
BACTERIA SPEC CULT: ABNORMAL
CC UR VC: ABNORMAL
SERVICE CMNT-IMP: ABNORMAL

## 2019-01-18 PROBLEM — Z86.69 HX OF MIGRAINES: Status: ACTIVE | Noted: 2019-01-18

## 2019-01-18 PROBLEM — F41.9 ANXIETY: Status: ACTIVE | Noted: 2019-01-18

## 2019-04-01 PROBLEM — M41.9 LUMBAR SPINE SCOLIOSIS: Chronic | Status: ACTIVE | Noted: 2019-04-01

## 2019-04-17 PROBLEM — G43.019 INTRACTABLE MIGRAINE WITHOUT AURA AND WITHOUT STATUS MIGRAINOSUS: Chronic | Status: RESOLVED | Noted: 2019-01-18 | Resolved: 2019-04-17

## 2019-04-17 PROBLEM — M54.42 ACUTE MIDLINE LOW BACK PAIN WITH BILATERAL SCIATICA: Status: ACTIVE | Noted: 2019-04-17

## 2019-04-17 PROBLEM — Z86.69 HX OF MIGRAINES: Status: RESOLVED | Noted: 2019-01-18 | Resolved: 2019-04-17

## 2019-04-17 PROBLEM — J30.89 ENVIRONMENTAL AND SEASONAL ALLERGIES: Status: ACTIVE | Noted: 2019-04-17

## 2019-04-17 PROBLEM — Z91.011 MILK ALLERGY: Chronic | Status: ACTIVE | Noted: 2019-04-17

## 2019-04-17 PROBLEM — J45.20 MILD INTERMITTENT ASTHMA WITHOUT COMPLICATION: Status: ACTIVE | Noted: 2019-04-17

## 2019-04-17 PROBLEM — M54.41 ACUTE MIDLINE LOW BACK PAIN WITH BILATERAL SCIATICA: Status: ACTIVE | Noted: 2019-04-17

## 2019-04-17 PROBLEM — F51.04 PSYCHOPHYSIOLOGICAL INSOMNIA: Status: ACTIVE | Noted: 2019-04-17

## 2019-12-10 PROBLEM — M16.0 OSTEOARTHRITIS OF BOTH HIPS: Chronic | Status: ACTIVE | Noted: 2019-12-10

## 2020-07-28 PROBLEM — Z76.5 DRUG-SEEKING BEHAVIOR: Status: ACTIVE | Noted: 2020-07-28

## 2020-07-28 PROBLEM — M50.30 DDD (DEGENERATIVE DISC DISEASE), CERVICAL: Status: ACTIVE | Noted: 2020-07-28

## 2020-07-28 PROBLEM — E78.5 HYPERLIPIDEMIA: Status: ACTIVE | Noted: 2020-07-28

## 2020-07-28 PROBLEM — M51.36 DDD (DEGENERATIVE DISC DISEASE), LUMBAR: Status: ACTIVE | Noted: 2020-07-28

## 2020-09-06 PROBLEM — M85.89 OSTEOPENIA OF MULTIPLE SITES: Status: ACTIVE | Noted: 2020-09-06

## 2020-09-16 PROBLEM — K21.9 GASTROESOPHAGEAL REFLUX DISEASE: Status: ACTIVE | Noted: 2020-09-16

## 2020-09-17 PROBLEM — R74.01 ELEVATED ALT MEASUREMENT: Status: ACTIVE | Noted: 2020-09-17

## 2020-10-08 PROBLEM — K76.0 FATTY LIVER: Chronic | Status: ACTIVE | Noted: 2020-10-08

## 2021-03-15 ENCOUNTER — OFFICE VISIT (OUTPATIENT)
Dept: FAMILY MEDICINE CLINIC | Age: 56
End: 2021-03-15
Payer: MEDICAID

## 2021-03-15 VITALS
OXYGEN SATURATION: 98 % | TEMPERATURE: 96 F | HEIGHT: 70 IN | HEART RATE: 80 BPM | DIASTOLIC BLOOD PRESSURE: 82 MMHG | RESPIRATION RATE: 18 BRPM | BODY MASS INDEX: 30.12 KG/M2 | WEIGHT: 210.38 LBS | SYSTOLIC BLOOD PRESSURE: 115 MMHG

## 2021-03-15 DIAGNOSIS — M51.36 DDD (DEGENERATIVE DISC DISEASE), LUMBAR: ICD-10-CM

## 2021-03-15 DIAGNOSIS — R94.31 ABNORMAL EKG: ICD-10-CM

## 2021-03-15 DIAGNOSIS — R55 SYNCOPE, UNSPECIFIED SYNCOPE TYPE: ICD-10-CM

## 2021-03-15 DIAGNOSIS — R42 DIZZINESS: Primary | ICD-10-CM

## 2021-03-15 DIAGNOSIS — R22.1 NECK SWELLING: ICD-10-CM

## 2021-03-15 DIAGNOSIS — M16.0 PRIMARY OSTEOARTHRITIS OF BOTH HIPS: ICD-10-CM

## 2021-03-15 DIAGNOSIS — E78.5 HYPERLIPIDEMIA, UNSPECIFIED HYPERLIPIDEMIA TYPE: ICD-10-CM

## 2021-03-15 DIAGNOSIS — R29.6 FREQUENT FALLS: ICD-10-CM

## 2021-03-15 DIAGNOSIS — K76.0 FATTY LIVER: Chronic | ICD-10-CM

## 2021-03-15 PROCEDURE — 36415 COLL VENOUS BLD VENIPUNCTURE: CPT | Performed by: NURSE PRACTITIONER

## 2021-03-15 PROCEDURE — 99214 OFFICE O/P EST MOD 30 MIN: CPT | Performed by: NURSE PRACTITIONER

## 2021-03-15 PROCEDURE — 93000 ELECTROCARDIOGRAM COMPLETE: CPT | Performed by: NURSE PRACTITIONER

## 2021-03-15 NOTE — PROGRESS NOTES
Subjective:     CC: dizziness    Lore Mauro is a 54 y.o. female who presents today with c/o dizziness and lightheadedness that started a couple of weeks ago. She actually passed out a couple of times. Once was in the bathroom and another time was while she was walking around in the grocery store. The dizziness comes on quickly. She denies any preceding chest pain, pressure, palpitations, or SOB. She is also c/o ongoing pain in her left hip and lower back. She has had a few falls due to it \"giving out\" on her. She feels like it \"pops out of place. \" She has a large scab on her left knee and a faded bruise on the left shin from a fall last week. She does not have any assistive devices at home to support her during ambulation. She has OA and is seeing orthopedist Dr Willa Valera. He has been having her injections but they are not helping. She has tried PT in the past but it did not help either. She is currently taking Lyrica 300mg BID, Robaxin, and Mobic for pain. She also has osteopenia and is taking a Calcium-Vit D supplement daily. She has gained more weight, for a total of 40 pounds in the past year. She was 170 pounds in 1/2020, now up to 210 pounds. She is trying to eat healthy, eating 4 spinach salads a week. Her thyroid was normal in September. May be related to Lyrica. She has fatty liver disease and had ultrasound in 10/2020 that showed fatty infiltration of liver without cirrhosis.        Patient Active Problem List   Diagnosis Code    Anxiety F41.9    Lumbar spine scoliosis M41.9    Milk allergy Z91.011    Mild intermittent asthma without complication F28.79    Environmental and seasonal allergies J30.89    Psychophysiological insomnia F51.04    Acute midline low back pain with bilateral sciatica M54.42, M54.41    Osteoarthritis of both hips M16.0    Drug-seeking behavior Z76.5    DDD (degenerative disc disease), cervical M50.30    DDD (degenerative disc disease), lumbar M51.36    Hyperlipidemia E78.5    Osteopenia of multiple sites M85.89    Gastroesophageal reflux disease K21.9    Elevated ALT measurement R74.01    Fatty liver K76.0       Past Medical History:   Diagnosis Date    Asthma     Hx of migraines 1/18/2019         Current Outpatient Medications:     pregabalin (LYRICA) 300 mg capsule, Take 1 Cap by mouth two (2) times a day. Max Daily Amount: 600 mg., Disp: 60 Cap, Rfl: 2    propranoloL (INDERAL) 80 mg tablet, TAKE 1 TABLET BY MOUTH NIGHTLY, Disp: 30 Tab, Rfl: 2    ramelteon (ROZEREM) 8 mg tablet, TAKE 1 TABLET BY MOUTH NIGHTLY, Disp: 30 Tab, Rfl: 2    mirtazapine (REMERON) 15 mg tablet, Take 1 Tab by mouth nightly., Disp: 90 Tab, Rfl: 0    methocarbamoL (ROBAXIN) 750 mg tablet, Take 1 Tab by mouth three (3) times daily. , Disp: 90 Tab, Rfl: 0    meloxicam (MOBIC) 7.5 mg tablet, Take 1 Tab by mouth daily. , Disp: 90 Tab, Rfl: 0    omeprazole (PRILOSEC) 20 mg capsule, TAKE 1 CAPSULE BY MOUTH ONCE DAILY, Disp: 90 Cap, Rfl: 1    cetirizine (ZYRTEC) 10 mg tablet, TAKE 1 TABLET BY MOUTH EVERY DAY, Disp: 90 Tab, Rfl: 1    calcium carb/vit D3/minerals (Calcium Carbonate-Vit D3-Min) 600 mg (1,500 mg)-200 unit chew, Take 1 pill daily  Indications: post-menopausal osteoporosis prevention, Disp: 90 Tab, Rfl: 1    venlafaxine-SR (EFFEXOR-XR) 150 mg capsule, TAKE 1 CAPSULE BY MOUTH EVERY DAY, Disp: 30 Cap, Rfl: 2    SUMAtriptan (IMITREX) 50 mg tablet, Take 1 pill at the onset of headache. May repeat dose in 2 hours if needed. Do not take more than 2 tabs in 24 hours. Indications: a migraine headache, Disp: 20 Tab, Rfl: 3    ondansetron (ZOFRAN ODT) 8 mg disintegrating tablet, Take 1 Tab by mouth every eight (8) hours as needed for Nausea., Disp: 20 Tab, Rfl: 0    albuterol (PROVENTIL HFA, VENTOLIN HFA, PROAIR HFA) 90 mcg/actuation inhaler, Take 2 Puffs by inhalation every four (4) hours as needed for Wheezing.  Indications: asthma attack, Disp: 1 Inhaler, Rfl: 3    Allergies Allergen Reactions    Tramadol Itching       No past surgical history on file.     Social History     Tobacco Use   Smoking Status Never Smoker   Smokeless Tobacco Never Used       Social History     Socioeconomic History    Marital status:      Spouse name: Not on file    Number of children: Not on file    Years of education: Not on file    Highest education level: Not on file   Tobacco Use    Smoking status: Never Smoker    Smokeless tobacco: Never Used   Substance and Sexual Activity    Alcohol use: No    Drug use: No    Sexual activity: Yes       Family History   Problem Relation Age of Onset    Cancer Mother     Diabetes Mother     Hypertension Mother     High Cholesterol Mother     Cancer Father     Diabetes Father     Hypertension Father     High Cholesterol Father     Cancer Maternal Grandmother     Diabetes Maternal Grandmother     Hypertension Maternal Grandmother     High Cholesterol Maternal Grandmother     Cancer Maternal Grandfather     Diabetes Maternal Grandfather     Hypertension Maternal Grandfather     High Cholesterol Maternal Grandfather     Hypertension Paternal Grandmother     High Cholesterol Paternal Grandmother     Diabetes Paternal Grandmother     Cancer Paternal Grandmother     High Cholesterol Paternal Grandfather     Hypertension Paternal Grandfather     Diabetes Paternal Grandfather     Cancer Paternal Grandfather        ROS:  Gen: denies fever or chills, + weight gain and fatigue  HEENT:denies H/A, vision changes, nasal congestion, or sore throat  Resp: denies dyspnea, cough, or wheezing  CV: denies chest pain, pressure, or palpitations +syncope  Extremeties: denies edema  Musculoskeletal: +chronic low back pain and bilateral hip pain with buckling of left him  Neuro: denies numbness/tingling + dizziness    Skin: +scab and bruise to left knee   Psych: + anxiety, depression, and insomnia    Objective:     Visit Vitals  /82 (BP 1 Location: Left upper arm)   Pulse 80   Temp (!) 96 °F (35.6 °C) (Temporal)   Resp 18   Ht 5' 10\" (1.778 m)   Wt 210 lb 6 oz (95.4 kg)   SpO2 98%   BMI 30.19 kg/m²       General: Alert and oriented. No acute distress. Well nourished. HEENT :  Eyes: Sclera white, conjunctiva clear. PERRLA. Extra ocular movements intact. Neck: +swelling vs extra fat tissue in the anterior neck. No palpable lymph nodes or masses  Lungs: Breathing even and unlabored. All lobes clear to auscultation bilaterally   Heart :RRR, S1 and S2 normal intensity, no extra heart sounds  Extremities: Non-edematous  Back: +TTP with limited ROM due to pain  Musculo: Hips: TTP to both lateral hips with pain on ROM   Neuro: Cranial nerves grossly normal.  Sensory: Sensation intact. Psych: Patient is tearful today because she does not want to go on living with her frequent falls. Dressed appropriately but with good hygiene today. Skin: +dry skin to both legs, +large scab to left knee with an old green bruise going down the left shin     Assessment/ Plan:     Syncope associated with dizziness  Check labs including CBC, CMP, mag, TSH  EKG done today- shows NSR with possible R BBB and nonspecific T abnormality  Referred to cardiology for further evaluation  Get CT of brain  Get carotid ultrasound  Go to ER for CP, SOB, LOC, or other emergencies  F/U 6 weeks    OA of both hips with frequent falls  Script written for walker to give support with ambulation at home to prevent future falls.    She has tried PT in the past and it was not helpful  Cont current meds prn pain  Advised her to follow up with orthopedist Dr Bety Jang- she may be candidate for hip replacement    Neck swelling  Get thyroid ultrasound    Osteopenia  Cont OTC Calcium Vit D pill  May repeat DEXA in 2022    Weight gain  May be related to Lyrica vs Covid pandemic and stress  Recheck TSH  F/U 6 weeks    HLD with fatty liver  Check lipid panel and LFT's        Verbal and written instructions (see AVS) provided.  Patient expresses understanding of diagnosis and treatment plan. Health Maintenance Due   Topic Date Due    DTaP/Tdap/Td series (1 - Tdap) Never done    PAP AKA CERVICAL CYTOLOGY  Never done    Shingrix Vaccine Age 50> (1 of 2) Never done    Colorectal Cancer Screening Combo  Never done    Breast Cancer Screen Mammogram  02/22/2018    Flu Vaccine (1) 09/01/2020               Latasha Spring NP

## 2021-03-15 NOTE — PROGRESS NOTES
1. Have you been to the ER, urgent care clinic since your last visit? Hospitalized since your last visit? No    2. Have you seen or consulted any other health care providers outside of the [address] [place] [address] since your last visit? Include any pap smears or colon screening.  No

## 2021-03-15 NOTE — PATIENT INSTRUCTIONS
Fainting: Care Instructions Your Care Instructions When you faint, or pass out, you lose consciousness for a short time. A brief drop in blood flow to the brain often causes it. When you fall or lie down, more blood flows to your brain and you regain consciousness. Emotional stress, pain, or overheatingespecially if you have been standingcan make you faint. In these cases, fainting is usually not serious. But fainting can be a sign of a more serious problem. Your doctor may want you to have more tests to rule out other causes. The treatment you need depends on the reason why you fainted. The doctor has checked you carefully, but problems can develop later. If you notice any problems or new symptoms, get medical treatment right away. Follow-up care is a key part of your treatment and safety. Be sure to make and go to all appointments, and call your doctor if you are having problems. It's also a good idea to know your test results and keep a list of the medicines you take. How can you care for yourself at home? · Drink plenty of fluids to prevent dehydration. If you have kidney, heart, or liver disease and have to limit fluids, talk with your doctor before you increase your fluid intake. When should you call for help? Call 911 anytime you think you may need emergency care. For example, call if: 
  · You have symptoms of a heart problem. These may include: 
? Chest pain or pressure. ? Severe trouble breathing. ? A fast or irregular heartbeat. ? Lightheadedness or sudden weakness. ? Coughing up pink, foamy mucus. ? Passing out. After you call 911, the  may tell you to chew 1 adult-strength or 2 to 4 low-dose aspirin. Wait for an ambulance. Do not try to drive yourself.  
  · You have symptoms of a stroke. These may include: 
? Sudden numbness, tingling, weakness, or loss of movement in your face, arm, or leg, especially on only one side of your body. ? Sudden vision changes.  
? Sudden trouble speaking. ? Sudden confusion or trouble understanding simple statements. ? Sudden problems with walking or balance. ? A sudden, severe headache that is different from past headaches.  
  · You passed out (lost consciousness) again. Watch closely for changes in your health, and be sure to contact your doctor if: 
  · You do not get better as expected. Where can you learn more? Go to http://www.gray.com/ Enter E219 in the search box to learn more about \"Fainting: Care Instructions. \" Current as of: June 26, 2019               Content Version: 12.6 © 4024-2135 [employer]. Care instructions adapted under license by [employer] (which disclaims liability or warranty for this information). If you have questions about a medical condition or this instruction, always ask your healthcare professional. [address] any warranty or liability for your use of this information.

## 2021-03-16 LAB
ALBUMIN SERPL-MCNC: 4 G/DL (ref 3.5–5)
ALBUMIN/GLOB SERPL: 1.1 {RATIO} (ref 1.1–2.2)
ALP SERPL-CCNC: 117 U/L (ref 45–117)
ALT SERPL-CCNC: 35 U/L (ref 12–78)
ANION GAP SERPL CALC-SCNC: 4 MMOL/L (ref 5–15)
AST SERPL-CCNC: 18 U/L (ref 15–37)
BILIRUB SERPL-MCNC: 0.3 MG/DL (ref 0.2–1)
BUN SERPL-MCNC: 13 MG/DL (ref 6–20)
BUN/CREAT SERPL: 18 (ref 12–20)
CALCIUM SERPL-MCNC: 9 MG/DL (ref 8.5–10.1)
CHLORIDE SERPL-SCNC: 106 MMOL/L (ref 97–108)
CHOLEST SERPL-MCNC: 260 MG/DL
CO2 SERPL-SCNC: 30 MMOL/L (ref 21–32)
CREAT SERPL-MCNC: 0.71 MG/DL (ref 0.55–1.02)
ERYTHROCYTE [DISTWIDTH] IN BLOOD BY AUTOMATED COUNT: 13.7 % (ref 11.5–14.5)
GLOBULIN SER CALC-MCNC: 3.8 G/DL (ref 2–4)
GLUCOSE SERPL-MCNC: 95 MG/DL (ref 65–100)
HCT VFR BLD AUTO: 40.4 % (ref 35–47)
HDLC SERPL-MCNC: 52 MG/DL
HDLC SERPL: 5 {RATIO} (ref 0–5)
HGB BLD-MCNC: 13.1 G/DL (ref 11.5–16)
LDLC SERPL CALC-MCNC: 170.8 MG/DL (ref 0–100)
LIPID PROFILE,FLP: ABNORMAL
MAGNESIUM SERPL-MCNC: 2.3 MG/DL (ref 1.6–2.4)
MCH RBC QN AUTO: 27.9 PG (ref 26–34)
MCHC RBC AUTO-ENTMCNC: 32.4 G/DL (ref 30–36.5)
MCV RBC AUTO: 86 FL (ref 80–99)
NRBC # BLD: 0 K/UL (ref 0–0.01)
NRBC BLD-RTO: 0 PER 100 WBC
PLATELET # BLD AUTO: 252 K/UL (ref 150–400)
PMV BLD AUTO: 11.8 FL (ref 8.9–12.9)
POTASSIUM SERPL-SCNC: 4 MMOL/L (ref 3.5–5.1)
PROT SERPL-MCNC: 7.8 G/DL (ref 6.4–8.2)
RBC # BLD AUTO: 4.7 M/UL (ref 3.8–5.2)
SODIUM SERPL-SCNC: 140 MMOL/L (ref 136–145)
TRIGL SERPL-MCNC: 186 MG/DL (ref ?–150)
TSH SERPL DL<=0.05 MIU/L-ACNC: 1.33 UIU/ML (ref 0.36–3.74)
VLDLC SERPL CALC-MCNC: 37.2 MG/DL
WBC # BLD AUTO: 7.4 K/UL (ref 3.6–11)

## 2021-03-16 NOTE — PROGRESS NOTES
Pt aware 2 identifiers verified name and    Pt says she is feeling so bad today she has been vomiting and dizzy and SOB so she says she may go get checked for COVID tomorrow

## 2021-04-05 ENCOUNTER — HOSPITAL ENCOUNTER (OUTPATIENT)
Dept: ULTRASOUND IMAGING | Age: 56
Discharge: HOME | End: 2021-04-05
Payer: MEDICAID

## 2021-04-05 ENCOUNTER — HOSPITAL ENCOUNTER (OUTPATIENT)
Dept: CT IMAGING | Age: 56
Discharge: HOME | End: 2021-04-05
Payer: MEDICAID

## 2021-04-05 DIAGNOSIS — R42 DIZZINESS: ICD-10-CM

## 2021-04-05 DIAGNOSIS — R22.1 NECK SWELLING: ICD-10-CM

## 2021-04-05 DIAGNOSIS — R55 SYNCOPE, UNSPECIFIED SYNCOPE TYPE: ICD-10-CM

## 2021-04-05 LAB
LEFT BULB PSV: 2 CM/S
LEFT CCA DIST DIAS: 29 CM/S
LEFT CCA DIST SYS: 73.7 CM/S
LEFT CCA PROX DIAS: 31.8 CM/S
LEFT CCA PROX SYS: 105.6 CM/S
LEFT ECA DIAS: 28.17 CM/S
LEFT ECA SYS: 72.6 CM/S
LEFT ICA DIST DIAS: 28.8 CM/S
LEFT ICA DIST SYS: 57.6 CM/S
LEFT ICA MID DIAS: 20.7 CM/S
LEFT ICA MID SYS: 47.9 CM/S
LEFT ICA PROX DIAS: 29 CM/S
LEFT ICA PROX SYS: 81 CM/S
LEFT ICA/CCA SYS: 0.78
LEFT VERTEBRAL DIAS: 10.14 CM/S
LEFT VERTEBRAL SYS: 35.9 CM/S
RIGHT CCA DIST DIAS: 28.3 CM/S
RIGHT CCA DIST SYS: 76.1 CM/S
RIGHT CCA PROX DIAS: 31.2 CM/S
RIGHT CCA PROX SYS: 87.3 CM/S
RIGHT ECA DIAS: 19.86 CM/S
RIGHT ECA SYS: 60.9 CM/S
RIGHT ICA DIST DIAS: 25.8 CM/S
RIGHT ICA DIST SYS: 59.5 CM/S
RIGHT ICA MID DIAS: 22.6 CM/S
RIGHT ICA MID SYS: 53.3 CM/S
RIGHT ICA PROX DIAS: 11 CM/S
RIGHT ICA PROX SYS: 35.4 CM/S
RIGHT ICA/CCA SYS: 0.8
RIGHT VERTEBRAL DIAS: 16.33 CM/S
RIGHT VERTEBRAL SYS: 35.3 CM/S

## 2021-04-05 PROCEDURE — 70450 CT HEAD/BRAIN W/O DYE: CPT

## 2021-04-05 PROCEDURE — 93880 EXTRACRANIAL BILAT STUDY: CPT

## 2021-04-05 PROCEDURE — 76536 US EXAM OF HEAD AND NECK: CPT

## 2021-04-11 PROBLEM — I65.21 CAROTID STENOSIS, RIGHT: Status: ACTIVE | Noted: 2021-04-11

## 2021-04-11 PROBLEM — E04.9 NODULAR GOITER: Chronic | Status: ACTIVE | Noted: 2021-04-11

## 2021-04-11 NOTE — PROGRESS NOTES
Carotid ultrasound shows mild stenosis or narrowing of the right artery- we can recheck this in 2 years. Left carotid artery is normal. She should take a baby aspirin daily and start a medication to prevent more plaque build up. This med will also lower cholesterol. Lipitor- take 1 pill at bedtime.  Script sent

## 2021-04-11 NOTE — PROGRESS NOTES
CT scan shows a normal brain but it looks like she has a sinus infection. Will send in antibiotic to treat.  Hopefully this will relieve the dizziness

## 2021-04-11 NOTE — PROGRESS NOTES
She has multiple thyroid nodules, some of which may need to be biopsied. I'd like her to see Endo. Referral order placed.

## 2021-04-29 DIAGNOSIS — J30.89 ENVIRONMENTAL AND SEASONAL ALLERGIES: ICD-10-CM

## 2021-04-29 DIAGNOSIS — M50.30 DDD (DEGENERATIVE DISC DISEASE), CERVICAL: ICD-10-CM

## 2021-04-29 DIAGNOSIS — M51.36 DDD (DEGENERATIVE DISC DISEASE), LUMBAR: ICD-10-CM

## 2021-04-29 NOTE — TELEPHONE ENCOUNTER
Pt needs refills. Not sure why [facility] denied them.     Ramelteon 8 mg  Cetirizine 10 mg  Pregabalin 300 mg    Pt also wanted [facility] to be aware she's scheduled for hip surgery on 5/18

## 2021-04-30 ENCOUNTER — HOSPITAL ENCOUNTER (OUTPATIENT)
Dept: PREADMISSION TESTING | Age: 56
Discharge: HOME | End: 2021-04-30

## 2021-04-30 ENCOUNTER — HOSPITAL ENCOUNTER (OUTPATIENT)
Dept: LAB | Age: 56
Discharge: HOME | End: 2021-04-30

## 2021-04-30 ENCOUNTER — TRANSCRIBE ORDER (OUTPATIENT)
Dept: REGISTRATION | Age: 56
End: 2021-04-30

## 2021-04-30 DIAGNOSIS — M16.12 PRIMARY OSTEOARTHRITIS OF LEFT HIP: Primary | ICD-10-CM

## 2021-04-30 DIAGNOSIS — M16.12 PRIMARY OSTEOARTHRITIS OF LEFT HIP: ICD-10-CM

## 2021-04-30 PROCEDURE — 99001 SPECIMEN HANDLING PT-LAB: CPT

## 2021-04-30 RX ORDER — PREGABALIN 300 MG/1
300 CAPSULE ORAL 2 TIMES DAILY
Qty: 60 CAP | Refills: 2 | Status: SHIPPED | OUTPATIENT
Start: 2021-04-30 | End: 2021-07-18

## 2021-04-30 RX ORDER — CETIRIZINE HCL 10 MG
TABLET ORAL
Qty: 90 TAB | Refills: 1 | Status: SHIPPED | OUTPATIENT
Start: 2021-04-30 | End: 2021-11-13

## 2021-04-30 NOTE — TELEPHONE ENCOUNTER
Thanks for letting me know, please remind her that the maximum dose of Lyrica is 300mg BID. And for sleep she can take the Relmelteon OR Mirtazapine but NOT both.  I just filled the Mirtazepin earlier this month

## 2021-05-04 ENCOUNTER — DOCUMENTATION ONLY (OUTPATIENT)
Dept: FAMILY MEDICINE CLINIC | Age: 56
End: 2021-05-04

## 2021-05-04 NOTE — PROGRESS NOTES
Pt saw ortho, Dr. Hernan Torrez for left hip pain worsening, they plan on doing total hip arthroplasty and started the patient on Norco to be utilized for acute pain and it will be limited supply until surgery is planned.

## 2021-05-11 ENCOUNTER — HOSPITAL ENCOUNTER (OUTPATIENT)
Dept: PREADMISSION TESTING | Age: 56
Discharge: HOME | End: 2021-05-11
Payer: MEDICAID

## 2021-05-11 PROCEDURE — U0003 INFECTIOUS AGENT DETECTION BY NUCLEIC ACID (DNA OR RNA); SEVERE ACUTE RESPIRATORY SYNDROME CORONAVIRUS 2 (SARS-COV-2) (CORONAVIRUS DISEASE [COVID-19]), AMPLIFIED PROBE TECHNIQUE, MAKING USE OF HIGH THROUGHPUT TECHNOLOGIES AS DESCRIBED BY CMS-2020-01-R: HCPCS

## 2021-05-12 PROBLEM — M16.12 OSTEOARTHRITIS OF LEFT HIP: Status: ACTIVE | Noted: 2019-12-10

## 2021-05-12 LAB — SARS-COV-2, COV2NT: NOT DETECTED

## 2021-05-12 NOTE — H&P
Patient Name:  Dmitriy Mandujano   Account #:  [de-identified]  YOB: 1965      Chief Complaint:  Left hip worsening pain. History of Chief Complaint:  Ms. Sue Patient presents today for followup of her lower back and left hip. She reports in the past month she has had at least ten falls or near-falls due to severe pain in her left hip with difficulty with weight bearing. She is currently uses utilizing a walker for assistance with ambulation. She rates her pain at 10/10. She reports difficulty with ambulation and regular day-to-day activities because of the worsening pain. She has previously been through physical therapy. She has had injections and she is taking Lyrica with minimal improvement. She has previously had an injection of the left hip. She had mild improvement initially but unfortunately not long-lasting. She is also currently utilizing heat, hot baths and Epsom soaks without improvement. She complains of severe 10/10 pain in the left hip radiating into the groin, down the anterior aspect of the thigh to the level of the knee as well as posterior buttock pain. She is here today for evaluation and to consider further treatment options, seen by myself and Dr. Oleksandr Solis.     Past Medical/Surgical History:    Disease/Disorder Date Side Surgery Date Side Comment   Aortic valve disorder         Arthritis         Asthma         Headache, migraine         Thyroid nodule            Bilateral tubal ligation 1990       Allergies:    Ingredient Reaction Medication Name Comment   TRAMADOL          Current Medications:    Medication Directions   aspirin 81 mg tablet,delayed release    atorvastatin 20 mg tablet    cetirizine 10 mg tablet    hydrocodone 5 mg-acetaminophen 325 mg tablet take 1 tablet by oral route  every 6 hours as needed for pain   mirtazapine 15 mg tablet    Narcan 4 mg/actuation nasal spray spray 0.1 milliliter by intranasal route in 1 nostril may repeat dose every 2-3 minutes as needed alternating nostrils with each dose   omeprazole 20 mg capsule,delayed release    One-A-Day Women's 50 Plus    pregabalin 300 mg capsule    propranolol 80 mg tablet    ramelteon 8 mg tablet    Tylenol Arthritis Pain 650 mg tablet,extended release    venlafaxine  mg capsule,extended release 24 hr    Vitamin C    Vitamin D3      Social History:      ALCOHOL  There is no history of alcohol use. Family History:    Disease Detail Family Member Age Cause of Death Comments   Cancer, breast Mother  N    Family history of Cancer, unknown   N      Review of Systems:    GENERAL: Patient presents with weight change. Patient has no signs of fever or chills. HEAD/ENTM:  Patient has no signs of headaches, dizziness, hearing loss, ringing in ears, sore throat, recent cold, double vision, blurred vision, itchy eyes, eye redness or eye discharge. NEUROLOGIC: Patient presents with loss of balance. Patient has no signs of fainting, muscle weakness, numbness/tingling or seizure disorder. CARDIOVASCULAR:  Patient has no signs of chest pain, palpitations, rheumatic fever or heart murmur. RESPIRATORY:  Patient has no signs of chronic cough, wheezing, difficulty breathing, pain on breathing or shortness of breath. GASTROINTESTINAL: Patient presents with indigestion and diarrhea. Patient has no signs of nausea/vomiting, difficulty swallowing, gas/bloating, abdominal pain, bloody stools or hemorrhoids. GENITOURINARY:  Patient has no signs of blood in the urine, painful urinating, burning sensation, bladder/kidney infection, frequent urinating or incontinence. MUSCULOSKELETAL: Patient presents with joint stiffness and joint pain. Patient has no signs of fracture/dislocation, sprain/strain, tendonitis, rheumatoid disease, gout or swelling in feet. INTEGUMENTARY:  Patient has no signs of rash/itching, psoriasis, Raynaud's phenomenon or varicose veins. EMOTIONAL: Patient presents with anxiety.  Patient has no signs of depression, bipolar disorder, memory loss or change in mood. Vitals:  Date BP Pulse Temp (F) Resp. (per min.) Height (Total in.) Weight (lbs.) BMI   04/26/2021     70.00 210.00 30.13   02/17/2021     70.00  25.11   09/30/2020     70.00  25.11   08/17/2020     70.00  25.11     Physical Examination:    Psychiatric/General:  No acute distress; pleasant and accommodating. HEENT:  Moist mucous membranes intact. Lymphatic:  Neck is supple with no lymphadenopathy upon evaluation. Respiratory:   Equal bilateral chest wall movement with inspiration; no wheezes or stridor audible. Cardiovascular:    Regular rate and rhythm, as noted by upper extremity pulses. Musculoskeletal: On evaluation of the left hip, severe pain is elicited about the anterior aspect of the thigh and into the groin with forward flexion, internal and external rotation with   tenderness to palpation about the lateral aspect of the hip and the trochanteric bursa. Evaluation of the lumbar spine shows tenderness to palpation about the left lumbar paraspinals that radiates to the gluteus, increased with flexion and extension. Gross motor and sensation is intact in the lower extremity. Range of motion of the left knee shows flexion and extension is without limitation. No obvious tenderness to palpation is elicited about the knee. No instability with varus or valgus stress. 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 AP, lateral, bilateral oblique, flexion and extension views of the lumbar spine were obtained and interpreted in the office today and show degenerative scoliotic curvature and posterior facet arthropathy of the lower lumbar levels with spondylosis.     Standing AP, tunnel, lateral, and sunrise views of the left knee were obtained and interpreted in the office today and  reveal no periosteal reaction, no medullary lesions, no osteopenia, well-aligned joint spaces, and no chondrolysis. 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 Recommendation:  With failure of conservative measures we discussed moving forward with a left total hip arthroplasty. The patient was given a prescription for Norco to be utilized for acute pain. This will be a limited supply until surgery is planned. We discussed updating her bone density in the coming year in August 2022. The risks and benefits were reviewed. The risks and benefits were reviewed and include infection, bleeding, deep venous thrombosis, pulmonary embolism, heart attack, stroke, death, arthrofibrosis, need for manipulation, neurovascular compromise, need for revision surgical intervention, persistent long-term pain, need for chronic pain management, and metallic allergies. She will continue with plans for scheduling left hip and call with any and all concerns. The patient was counseled at length about the risks of ej Covid-19 during their perioperative period and any recovery window from their procedure. The patient was made aware that ej Covid-19  may worsen their prognosis for recovering from their procedure and lend to a higher morbidity and/or mortality risk. All material risks, benefits, and reasonable alternatives including postponing the procedure were discussed. The patient does  wish to proceed with the procedure at this time.     JUAREZ Cotto DO

## 2021-05-18 ENCOUNTER — APPOINTMENT (OUTPATIENT)
Dept: GENERAL RADIOLOGY | Age: 56
End: 2021-05-18
Attending: ORTHOPAEDIC SURGERY
Payer: MEDICAID

## 2021-05-18 ENCOUNTER — HOSPITAL ENCOUNTER (OUTPATIENT)
Age: 56
Setting detail: OUTPATIENT SURGERY
Discharge: HOME HEALTH | End: 2021-05-18
Attending: ORTHOPAEDIC SURGERY | Admitting: ORTHOPAEDIC SURGERY
Payer: MEDICAID

## 2021-05-18 ENCOUNTER — ANESTHESIA (OUTPATIENT)
Dept: SURGERY | Age: 56
End: 2021-05-18
Payer: MEDICAID

## 2021-05-18 ENCOUNTER — APPOINTMENT (OUTPATIENT)
Dept: GENERAL RADIOLOGY | Age: 56
End: 2021-05-18
Attending: PHYSICIAN ASSISTANT
Payer: MEDICAID

## 2021-05-18 ENCOUNTER — ANESTHESIA EVENT (OUTPATIENT)
Dept: SURGERY | Age: 56
End: 2021-05-18
Payer: MEDICAID

## 2021-05-18 VITALS
DIASTOLIC BLOOD PRESSURE: 53 MMHG | WEIGHT: 210.31 LBS | TEMPERATURE: 97.1 F | SYSTOLIC BLOOD PRESSURE: 97 MMHG | OXYGEN SATURATION: 96 % | RESPIRATION RATE: 16 BRPM | BODY MASS INDEX: 30.11 KG/M2 | HEART RATE: 88 BPM | HEIGHT: 70 IN

## 2021-05-18 DIAGNOSIS — Z96.642 S/P HIP REPLACEMENT, LEFT: Primary | ICD-10-CM

## 2021-05-18 LAB
ABO + RH BLD: NORMAL
BLOOD GROUP ANTIBODIES SERPL: NORMAL
SPECIMEN EXP DATE BLD: NORMAL

## 2021-05-18 PROCEDURE — 77030013079 HC BLNKT BAIR HGGR 3M -A: Performed by: ANESTHESIOLOGY

## 2021-05-18 PROCEDURE — 2709999900 HC NON-CHARGEABLE SUPPLY: Performed by: ORTHOPAEDIC SURGERY

## 2021-05-18 PROCEDURE — 76060000034 HC ANESTHESIA 1.5 TO 2 HR: Performed by: ORTHOPAEDIC SURGERY

## 2021-05-18 PROCEDURE — 77030034479 HC ADH SKN CLSR PRINEO J&J -B: Performed by: ORTHOPAEDIC SURGERY

## 2021-05-18 PROCEDURE — 77030008477 HC STYL SATN SLP COVD -A: Performed by: ANESTHESIOLOGY

## 2021-05-18 PROCEDURE — 86901 BLOOD TYPING SEROLOGIC RH(D): CPT

## 2021-05-18 PROCEDURE — 77030034694 HC SCPL CANADY PLSM DISP USMD -E: Performed by: ORTHOPAEDIC SURGERY

## 2021-05-18 PROCEDURE — 97530 THERAPEUTIC ACTIVITIES: CPT

## 2021-05-18 PROCEDURE — 77030031139 HC SUT VCRL2 J&J -A: Performed by: ORTHOPAEDIC SURGERY

## 2021-05-18 PROCEDURE — 73501 X-RAY EXAM HIP UNI 1 VIEW: CPT

## 2021-05-18 PROCEDURE — 36415 COLL VENOUS BLD VENIPUNCTURE: CPT

## 2021-05-18 PROCEDURE — 97535 SELF CARE MNGMENT TRAINING: CPT

## 2021-05-18 PROCEDURE — 74011250637 HC RX REV CODE- 250/637: Performed by: ANESTHESIOLOGY

## 2021-05-18 PROCEDURE — 76210000016 HC OR PH I REC 1 TO 1.5 HR: Performed by: ORTHOPAEDIC SURGERY

## 2021-05-18 PROCEDURE — C9290 INJ, BUPIVACAINE LIPOSOME: HCPCS | Performed by: ORTHOPAEDIC SURGERY

## 2021-05-18 PROCEDURE — 74011250636 HC RX REV CODE- 250/636: Performed by: REGISTERED NURSE

## 2021-05-18 PROCEDURE — 74011000250 HC RX REV CODE- 250: Performed by: PHYSICIAN ASSISTANT

## 2021-05-18 PROCEDURE — 74011000258 HC RX REV CODE- 258: Performed by: ORTHOPAEDIC SURGERY

## 2021-05-18 PROCEDURE — 97162 PT EVAL MOD COMPLEX 30 MIN: CPT

## 2021-05-18 PROCEDURE — 97166 OT EVAL MOD COMPLEX 45 MIN: CPT

## 2021-05-18 PROCEDURE — C1776 JOINT DEVICE (IMPLANTABLE): HCPCS | Performed by: ORTHOPAEDIC SURGERY

## 2021-05-18 PROCEDURE — 77030008683 HC TU ET CUF COVD -A: Performed by: ANESTHESIOLOGY

## 2021-05-18 PROCEDURE — 77030003666 HC NDL SPINAL BD -A: Performed by: ORTHOPAEDIC SURGERY

## 2021-05-18 PROCEDURE — 77030040361 HC SLV COMPR DVT MDII -B: Performed by: ORTHOPAEDIC SURGERY

## 2021-05-18 PROCEDURE — 76010000153 HC OR TIME 1.5 TO 2 HR: Performed by: ORTHOPAEDIC SURGERY

## 2021-05-18 PROCEDURE — 77030031140 HC SUT VCRL3 J&J -A: Performed by: ORTHOPAEDIC SURGERY

## 2021-05-18 PROCEDURE — 74011250636 HC RX REV CODE- 250/636: Performed by: ORTHOPAEDIC SURGERY

## 2021-05-18 PROCEDURE — 74011000250 HC RX REV CODE- 250: Performed by: ORTHOPAEDIC SURGERY

## 2021-05-18 PROCEDURE — 77030006643: Performed by: ANESTHESIOLOGY

## 2021-05-18 PROCEDURE — 77030020782 HC GWN BAIR PAWS FLX 3M -B: Performed by: ORTHOPAEDIC SURGERY

## 2021-05-18 PROCEDURE — 77030013708 HC HNDPC SUC IRR PULS STRY –B: Performed by: ORTHOPAEDIC SURGERY

## 2021-05-18 PROCEDURE — 77030027138 HC INCENT SPIROMETER -A: Performed by: ORTHOPAEDIC SURGERY

## 2021-05-18 PROCEDURE — 77030029099 HC BN WAX SSPC -A: Performed by: ORTHOPAEDIC SURGERY

## 2021-05-18 PROCEDURE — 77030038010: Performed by: ORTHOPAEDIC SURGERY

## 2021-05-18 PROCEDURE — 76210000026 HC REC RM PH II 1 TO 1.5 HR: Performed by: ORTHOPAEDIC SURGERY

## 2021-05-18 PROCEDURE — 97116 GAIT TRAINING THERAPY: CPT

## 2021-05-18 PROCEDURE — 74011250636 HC RX REV CODE- 250/636: Performed by: ANESTHESIOLOGY

## 2021-05-18 PROCEDURE — 74011000250 HC RX REV CODE- 250: Performed by: REGISTERED NURSE

## 2021-05-18 DEVICE — LINER ACET OD52MM ID36MM HIP ALTRX PINN: Type: IMPLANTABLE DEVICE | Site: HIP | Status: FUNCTIONAL

## 2021-05-18 DEVICE — ACTIS DUOFIX HIP PROSTHESIS (FEMORAL STEM 12/14 TAPER CEMENTLESS SIZE 7 STD COLLAR)  CE
Type: IMPLANTABLE DEVICE | Site: HIP | Status: FUNCTIONAL
Brand: ACTIS

## 2021-05-18 DEVICE — ELIMINATOR H APEX FOR 48-60MM PINN HIP SHELL: Type: IMPLANTABLE DEVICE | Site: HIP | Status: FUNCTIONAL

## 2021-05-18 DEVICE — HIP H2 TOT ADV OTHER HD IMPL CAPPED SYNTHES: Type: IMPLANTABLE DEVICE | Site: HIP | Status: FUNCTIONAL

## 2021-05-18 DEVICE — CUP ACET DIA52MM HIP GRIPTION PRI CEMENTLESS FIX SECT SER: Type: IMPLANTABLE DEVICE | Site: HIP | Status: FUNCTIONAL

## 2021-05-18 DEVICE — BIOLOX DELTA CERAMIC FEMORAL HEAD +8.5 36MM DIA 12/14 TAPER
Type: IMPLANTABLE DEVICE | Site: HIP | Status: FUNCTIONAL
Brand: BIOLOX DELTA

## 2021-05-18 RX ORDER — PROPOFOL 10 MG/ML
INJECTION, EMULSION INTRAVENOUS AS NEEDED
Status: DISCONTINUED | OUTPATIENT
Start: 2021-05-18 | End: 2021-05-18 | Stop reason: HOSPADM

## 2021-05-18 RX ORDER — CELECOXIB 200 MG/1
200 CAPSULE ORAL 2 TIMES DAILY
Qty: 60 CAP | Refills: 0 | Status: SHIPPED | OUTPATIENT
Start: 2021-05-18 | End: 2021-06-17

## 2021-05-18 RX ORDER — TRANEXAMIC ACID 10 MG/ML
1 INJECTION, SOLUTION INTRAVENOUS 2 TIMES DAILY
Status: COMPLETED | OUTPATIENT
Start: 2021-05-18 | End: 2021-05-18

## 2021-05-18 RX ORDER — DEXTROSE 50 % IN WATER (D50W) INTRAVENOUS SYRINGE
25-50 AS NEEDED
Status: DISCONTINUED | OUTPATIENT
Start: 2021-05-18 | End: 2021-05-18 | Stop reason: HOSPADM

## 2021-05-18 RX ORDER — DEXMEDETOMIDINE HYDROCHLORIDE 100 UG/ML
INJECTION, SOLUTION INTRAVENOUS AS NEEDED
Status: DISCONTINUED | OUTPATIENT
Start: 2021-05-18 | End: 2021-05-18 | Stop reason: HOSPADM

## 2021-05-18 RX ORDER — CELECOXIB 100 MG/1
200 CAPSULE ORAL ONCE
Status: COMPLETED | OUTPATIENT
Start: 2021-05-18 | End: 2021-05-18

## 2021-05-18 RX ORDER — MIDAZOLAM HYDROCHLORIDE 1 MG/ML
INJECTION, SOLUTION INTRAMUSCULAR; INTRAVENOUS AS NEEDED
Status: DISCONTINUED | OUTPATIENT
Start: 2021-05-18 | End: 2021-05-18 | Stop reason: HOSPADM

## 2021-05-18 RX ORDER — DIPHENHYDRAMINE HYDROCHLORIDE 50 MG/ML
12.5 INJECTION, SOLUTION INTRAMUSCULAR; INTRAVENOUS
Status: DISCONTINUED | OUTPATIENT
Start: 2021-05-18 | End: 2021-05-18 | Stop reason: HOSPADM

## 2021-05-18 RX ORDER — ROCURONIUM BROMIDE 10 MG/ML
INJECTION, SOLUTION INTRAVENOUS AS NEEDED
Status: DISCONTINUED | OUTPATIENT
Start: 2021-05-18 | End: 2021-05-18 | Stop reason: HOSPADM

## 2021-05-18 RX ORDER — FENTANYL CITRATE 50 UG/ML
INJECTION, SOLUTION INTRAMUSCULAR; INTRAVENOUS
Status: DISCONTINUED
Start: 2021-05-18 | End: 2021-05-18 | Stop reason: HOSPADM

## 2021-05-18 RX ORDER — LIDOCAINE HYDROCHLORIDE 20 MG/ML
INJECTION, SOLUTION EPIDURAL; INFILTRATION; INTRACAUDAL; PERINEURAL AS NEEDED
Status: DISCONTINUED | OUTPATIENT
Start: 2021-05-18 | End: 2021-05-18 | Stop reason: HOSPADM

## 2021-05-18 RX ORDER — CEPHALEXIN 500 MG/1
1000 CAPSULE ORAL EVERY 8 HOURS
Qty: 4 CAP | Refills: 0 | Status: SHIPPED | OUTPATIENT
Start: 2021-05-18 | End: 2021-05-19

## 2021-05-18 RX ORDER — INSULIN LISPRO 100 [IU]/ML
INJECTION, SOLUTION INTRAVENOUS; SUBCUTANEOUS ONCE
Status: DISCONTINUED | OUTPATIENT
Start: 2021-05-18 | End: 2021-05-18 | Stop reason: HOSPADM

## 2021-05-18 RX ORDER — OXYCODONE HYDROCHLORIDE 10 MG/1
10 TABLET ORAL
Qty: 28 TAB | Refills: 0 | Status: SHIPPED | OUTPATIENT
Start: 2021-05-18 | End: 2021-05-25

## 2021-05-18 RX ORDER — SODIUM CHLORIDE, SODIUM LACTATE, POTASSIUM CHLORIDE, CALCIUM CHLORIDE 600; 310; 30; 20 MG/100ML; MG/100ML; MG/100ML; MG/100ML
150 INJECTION, SOLUTION INTRAVENOUS CONTINUOUS
Status: DISCONTINUED | OUTPATIENT
Start: 2021-05-18 | End: 2021-05-18 | Stop reason: HOSPADM

## 2021-05-18 RX ORDER — ALBUTEROL SULFATE 0.83 MG/ML
2.5 SOLUTION RESPIRATORY (INHALATION) AS NEEDED
Status: DISCONTINUED | OUTPATIENT
Start: 2021-05-18 | End: 2021-05-18 | Stop reason: HOSPADM

## 2021-05-18 RX ORDER — HYDROCODONE BITARTRATE AND ACETAMINOPHEN 5; 325 MG/1; MG/1
1 TABLET ORAL AS NEEDED
Status: DISCONTINUED | OUTPATIENT
Start: 2021-05-18 | End: 2021-05-18 | Stop reason: HOSPADM

## 2021-05-18 RX ORDER — ONDANSETRON 2 MG/ML
4 INJECTION INTRAMUSCULAR; INTRAVENOUS ONCE
Status: COMPLETED | OUTPATIENT
Start: 2021-05-18 | End: 2021-05-18

## 2021-05-18 RX ORDER — SODIUM CHLORIDE 0.9 % (FLUSH) 0.9 %
5-40 SYRINGE (ML) INJECTION EVERY 8 HOURS
Status: DISCONTINUED | OUTPATIENT
Start: 2021-05-18 | End: 2021-05-18 | Stop reason: HOSPADM

## 2021-05-18 RX ORDER — SODIUM CHLORIDE, SODIUM LACTATE, POTASSIUM CHLORIDE, CALCIUM CHLORIDE 600; 310; 30; 20 MG/100ML; MG/100ML; MG/100ML; MG/100ML
125 INJECTION, SOLUTION INTRAVENOUS CONTINUOUS
Status: DISCONTINUED | OUTPATIENT
Start: 2021-05-18 | End: 2021-05-18 | Stop reason: HOSPADM

## 2021-05-18 RX ORDER — MAGNESIUM SULFATE 100 %
4 CRYSTALS MISCELLANEOUS AS NEEDED
Status: DISCONTINUED | OUTPATIENT
Start: 2021-05-18 | End: 2021-05-18 | Stop reason: HOSPADM

## 2021-05-18 RX ORDER — ONDANSETRON 2 MG/ML
INJECTION INTRAMUSCULAR; INTRAVENOUS AS NEEDED
Status: DISCONTINUED | OUTPATIENT
Start: 2021-05-18 | End: 2021-05-18 | Stop reason: HOSPADM

## 2021-05-18 RX ORDER — ACETAMINOPHEN 500 MG
1000 TABLET ORAL ONCE
Status: COMPLETED | OUTPATIENT
Start: 2021-05-18 | End: 2021-05-18

## 2021-05-18 RX ORDER — ENOXAPARIN SODIUM 100 MG/ML
40 INJECTION SUBCUTANEOUS EVERY 24 HOURS
Qty: 21 SYRINGE | Refills: 0 | Status: SHIPPED | OUTPATIENT
Start: 2021-05-18 | End: 2021-06-08

## 2021-05-18 RX ORDER — KETAMINE HCL IN 0.9 % NACL 50 MG/5 ML
SYRINGE (ML) INTRAVENOUS AS NEEDED
Status: DISCONTINUED | OUTPATIENT
Start: 2021-05-18 | End: 2021-05-18 | Stop reason: HOSPADM

## 2021-05-18 RX ORDER — METOCLOPRAMIDE HYDROCHLORIDE 5 MG/ML
INJECTION INTRAMUSCULAR; INTRAVENOUS AS NEEDED
Status: DISCONTINUED | OUTPATIENT
Start: 2021-05-18 | End: 2021-05-18 | Stop reason: HOSPADM

## 2021-05-18 RX ORDER — CEFAZOLIN SODIUM/WATER 2 G/20 ML
2 SYRINGE (ML) INTRAVENOUS ONCE
Status: COMPLETED | OUTPATIENT
Start: 2021-05-18 | End: 2021-05-18

## 2021-05-18 RX ORDER — GLYCOPYRROLATE 0.2 MG/ML
INJECTION INTRAMUSCULAR; INTRAVENOUS AS NEEDED
Status: DISCONTINUED | OUTPATIENT
Start: 2021-05-18 | End: 2021-05-18 | Stop reason: HOSPADM

## 2021-05-18 RX ORDER — SODIUM CHLORIDE 0.9 % (FLUSH) 0.9 %
5-40 SYRINGE (ML) INJECTION AS NEEDED
Status: DISCONTINUED | OUTPATIENT
Start: 2021-05-18 | End: 2021-05-18 | Stop reason: HOSPADM

## 2021-05-18 RX ORDER — FENTANYL CITRATE 50 UG/ML
25 INJECTION, SOLUTION INTRAMUSCULAR; INTRAVENOUS
Status: DISCONTINUED | OUTPATIENT
Start: 2021-05-18 | End: 2021-05-18 | Stop reason: HOSPADM

## 2021-05-18 RX ORDER — NALOXONE HYDROCHLORIDE 0.4 MG/ML
0.1 INJECTION, SOLUTION INTRAMUSCULAR; INTRAVENOUS; SUBCUTANEOUS AS NEEDED
Status: DISCONTINUED | OUTPATIENT
Start: 2021-05-18 | End: 2021-05-18 | Stop reason: HOSPADM

## 2021-05-18 RX ORDER — FENTANYL CITRATE 50 UG/ML
INJECTION, SOLUTION INTRAMUSCULAR; INTRAVENOUS AS NEEDED
Status: DISCONTINUED | OUTPATIENT
Start: 2021-05-18 | End: 2021-05-18 | Stop reason: HOSPADM

## 2021-05-18 RX ORDER — HYDROMORPHONE HYDROCHLORIDE 1 MG/ML
0.2 INJECTION, SOLUTION INTRAMUSCULAR; INTRAVENOUS; SUBCUTANEOUS AS NEEDED
Status: DISCONTINUED | OUTPATIENT
Start: 2021-05-18 | End: 2021-05-18 | Stop reason: HOSPADM

## 2021-05-18 RX ADMIN — TRANEXAMIC ACID 1 G: 10 INJECTION, SOLUTION INTRAVENOUS at 11:06

## 2021-05-18 RX ADMIN — ONDANSETRON 4 MG: 2 INJECTION INTRAMUSCULAR; INTRAVENOUS at 13:15

## 2021-05-18 RX ADMIN — DEXMEDETOMIDINE HYDROCHLORIDE 6 MCG: 100 INJECTION, SOLUTION INTRAVENOUS at 11:49

## 2021-05-18 RX ADMIN — FENTANYL CITRATE 25 MCG: 50 INJECTION, SOLUTION INTRAMUSCULAR; INTRAVENOUS at 12:56

## 2021-05-18 RX ADMIN — METOCLOPRAMIDE 5 MG: 5 INJECTION, SOLUTION INTRAMUSCULAR; INTRAVENOUS at 10:54

## 2021-05-18 RX ADMIN — ROCURONIUM BROMIDE 40 MG: 10 INJECTION, SOLUTION INTRAVENOUS at 10:56

## 2021-05-18 RX ADMIN — ROCURONIUM BROMIDE 10 MG: 10 INJECTION, SOLUTION INTRAVENOUS at 11:13

## 2021-05-18 RX ADMIN — FENTANYL CITRATE 100 MCG: 50 INJECTION, SOLUTION INTRAMUSCULAR; INTRAVENOUS at 10:54

## 2021-05-18 RX ADMIN — FENTANYL CITRATE 50 MCG: 50 INJECTION, SOLUTION INTRAMUSCULAR; INTRAVENOUS at 12:34

## 2021-05-18 RX ADMIN — Medication 20 MG: at 11:23

## 2021-05-18 RX ADMIN — TRANEXAMIC ACID 1 G: 10 INJECTION, SOLUTION INTRAVENOUS at 12:24

## 2021-05-18 RX ADMIN — SODIUM CHLORIDE, SODIUM LACTATE, POTASSIUM CHLORIDE, AND CALCIUM CHLORIDE: 600; 310; 30; 20 INJECTION, SOLUTION INTRAVENOUS at 11:30

## 2021-05-18 RX ADMIN — FENTANYL CITRATE 50 MCG: 50 INJECTION, SOLUTION INTRAMUSCULAR; INTRAVENOUS at 11:58

## 2021-05-18 RX ADMIN — Medication 30 MG: at 10:56

## 2021-05-18 RX ADMIN — ROCURONIUM BROMIDE 5 MG: 10 INJECTION, SOLUTION INTRAVENOUS at 11:41

## 2021-05-18 RX ADMIN — CELECOXIB 200 MG: 100 CAPSULE ORAL at 09:23

## 2021-05-18 RX ADMIN — DEXMEDETOMIDINE HYDROCHLORIDE 4 MCG: 100 INJECTION, SOLUTION INTRAVENOUS at 11:41

## 2021-05-18 RX ADMIN — MIDAZOLAM 2 MG: 1 INJECTION INTRAMUSCULAR; INTRAVENOUS at 10:53

## 2021-05-18 RX ADMIN — CEFAZOLIN 2 G: 1 INJECTION, POWDER, FOR SOLUTION INTRAVENOUS at 11:07

## 2021-05-18 RX ADMIN — SUGAMMADEX 200 MG: 100 INJECTION, SOLUTION INTRAVENOUS at 12:35

## 2021-05-18 RX ADMIN — DEXMEDETOMIDINE HYDROCHLORIDE 8 MCG: 100 INJECTION, SOLUTION INTRAVENOUS at 11:53

## 2021-05-18 RX ADMIN — SODIUM CHLORIDE, SODIUM LACTATE, POTASSIUM CHLORIDE, AND CALCIUM CHLORIDE 125 ML/HR: 600; 310; 30; 20 INJECTION, SOLUTION INTRAVENOUS at 10:06

## 2021-05-18 RX ADMIN — GLYCOPYRROLATE 0.2 MG: 0.2 INJECTION INTRAMUSCULAR; INTRAVENOUS at 10:54

## 2021-05-18 RX ADMIN — LIDOCAINE HYDROCHLORIDE 80 MG: 20 INJECTION, SOLUTION EPIDURAL; INFILTRATION; INTRACAUDAL; PERINEURAL at 10:55

## 2021-05-18 RX ADMIN — ONDANSETRON HYDROCHLORIDE 4 MG: 2 INJECTION INTRAMUSCULAR; INTRAVENOUS at 11:06

## 2021-05-18 RX ADMIN — SODIUM CHLORIDE, SODIUM LACTATE, POTASSIUM CHLORIDE, AND CALCIUM CHLORIDE: 600; 310; 30; 20 INJECTION, SOLUTION INTRAVENOUS at 12:39

## 2021-05-18 RX ADMIN — DEXMEDETOMIDINE HYDROCHLORIDE 2 MCG: 100 INJECTION, SOLUTION INTRAVENOUS at 11:30

## 2021-05-18 RX ADMIN — PROPOFOL 200 MG: 10 INJECTION, EMULSION INTRAVENOUS at 10:55

## 2021-05-18 RX ADMIN — ACETAMINOPHEN 1000 MG: 500 TABLET ORAL at 09:23

## 2021-05-18 NOTE — ANESTHESIA PREPROCEDURE EVALUATION
Relevant Problems   RESPIRATORY SYSTEM   (+) Mild intermittent asthma without complication      GASTROINTESTINAL   (+) Fatty liver   (+) Gastroesophageal reflux disease       Anesthetic History   No history of anesthetic complications            Review of Systems / Medical History  Patient summary reviewed, nursing notes reviewed and pertinent labs reviewed    Pulmonary            Asthma        Neuro/Psych         Psychiatric history     Cardiovascular                  Exercise tolerance: >4 METS     GI/Hepatic/Renal           Liver disease     Endo/Other      Hypothyroidism  Arthritis     Other Findings              Physical Exam    Airway  Mallampati: II  TM Distance: 4 - 6 cm  Neck ROM: normal range of motion   Mouth opening: Normal     Cardiovascular  Regular rate and rhythm,  S1 and S2 normal,  no murmur, click, rub, or gallop             Dental    Dentition: Poor dentition     Pulmonary  Breath sounds clear to auscultation               Abdominal  GI exam deferred       Other Findings            Anesthetic Plan    ASA: 2  Anesthesia type: general          Induction: Intravenous  Anesthetic plan and risks discussed with: Patient

## 2021-05-18 NOTE — PERIOP NOTES
TRANSFER - OUT REPORT:    Verbal report given to Keyana Ray Rn(name) on [address]  being transferred to phase 2(unit) for routine post - op       Report consisted of patients Situation, Background, Assessment and   Recommendations(SBAR). Information from the following report(s) SBAR, Kardex, OR Summary, Procedure Summary, Intake/Output and MAR was reviewed with the receiving nurse. Lines:   Peripheral IV 05/18/21 Right Hand (Active)   Site Assessment Clean, dry, & intact 05/18/21 1312   Phlebitis Assessment 0 05/18/21 1312   Infiltration Assessment 0 05/18/21 1312   Dressing Status Clean, dry, & intact 05/18/21 1312   Dressing Type Transparent;Tape 05/18/21 1312   Hub Color/Line Status Green; Infusing 05/18/21 1312        Opportunity for questions and clarification was provided.       Patient transported with:   Registered Nurse  Tech

## 2021-05-18 NOTE — PERIOP NOTES
Reported to RAFITA ZHAO [facility] RN in [facility] of patient abnormal UA states she will inform MD Kay Carl

## 2021-05-18 NOTE — PROGRESS NOTES
Problem: Self Care Deficits Care Plan (Adult)  Goal: *Acute Goals and Plan of Care (Insert Text)  Description: Initial Occupational Therapy Goals (5/18/2021) Within 7 day(s):    1. Patient will perform grooming standing sinkside with supervision for increased independence with ADLs. 2. Patient will perform LB dressing with supervision & A/E PRN for increased independence with ADLs. 3. Patient will perform toilet transfer with supervision for increased independence with ADLs. 4. Patient will perform all aspects of toileting with supervision for increased independence with ADLs. 5. Patient will independently apply energy conservation techniques with 1 verbal cue(s)for increased independence with ADLs. 6. Patient will perform bathroom mobility with supervision for increased independence/safety with ADLs. Outcome: Progressing Towards Goal   OCCUPATIONAL THERAPY EVALUATION    Patient: Mary Moreland (9 y.o. female)  Date: 5/18/2021  Primary Diagnosis: OSTEO HIP LEFT  Procedure(s) (LRB):  LEFT TOTAL HIP ARTHROPLASTY ANTERIOR APPROACH WITH C-ARM (DEPUY) (Left) Day of Surgery   Precautions: Fall, WBAT  PLOF: pt independent for ADLs/functional mobility    ASSESSMENT AND RECOMMENDATIONS:  Based on the objective data described below, the patient presents with LLE decreased ROM and strength affecting LE ADLs. Pt found supine on stretcher, vitals assessed and WNL, pt reporting pain 1/10. Pt was able to sit up to EOB with SBA, additional time to complete. Pt requesting to use bathroom; pt CGA for STS/bathroom mobility/toilet transfer with vc for proper body mechanics and safe hand placement. Pt voided and performed bladder hygiene with SBA. Pt completed upper body dressing with supervision seated on toilet. Pt able to thread B feet through underwear/pants without assist, and CGA when standing to pull up to waist. Pt required additional time to complete functional mobility and ADLs due to drowsiness and nausea.  Pt ambulated back to recliner, ice applied to L hip. Daughter present during session for education on home safety. Provided opportunity for pt to voice questions on ADL performance when home, pt has no further concerns. Patient will benefit from skilled Occupational Therapy intervention to maximize safety/independence with ADLs at d/c.    Education: Reviewed home safety, body mechanics, importance of moving every hour to prevent joint stiffness, role of ice for edema/pain control, Rolling Walker management/safety, and adaptive dressing techniques with patient verbalizing  understanding at this time     Patient will benefit from skilled intervention to address the above impairments. Patient's rehabilitation potential is considered to be Good  Factors which may influence rehabilitation potential include:   [x]             None noted  []             Mental ability/status  []             Medical condition  []             Home/family situation and support systems  []             Safety awareness  []             Pain tolerance/management  []             Other:        PLAN :  Recommendations and Planned Interventions:   [x]               Self Care Training                  [x]      Therapeutic Activities  [x]               Functional Mobility Training   []      Cognitive Retraining  []               Therapeutic Exercises           []      Endurance Activities  []               Balance Training                    []      Neuromuscular Re-Education  []               Visual/Perceptual Training     [x]      Home Safety Training  [x]               Patient Education                   [x]      Family Training/Education  []               Other (comment):    Frequency/Duration: Patient will be followed by Occupational Therapy 1-2 times per day/4-7 days per week to address goals.   Discharge Recommendations: Home health with adult supervision at least 24 hours after d/c  Further Equipment Recommendations for Discharge: N/A SUBJECTIVE:   Patient stated i'm nauseous.     OBJECTIVE DATA SUMMARY:     Past Medical History:   Diagnosis Date    Asthma     Hx of migraines 1/18/2019    Psychiatric disorder     anxiety     Past Surgical History:   Procedure Laterality Date    HX TUBAL LIGATION       Barriers to Learning/Limitations: yes;  physical and altered mental status (i.e.Sedation, Confusion)  Compensate with: visual, verbal, tactile, kinesthetic cues/model    Home Situation/Prior Level of Function:   Home Situation  Home Environment: Private residence  # Steps to Enter: 4  Rails to Enter: Yes  Hand Rails : Left  One/Two Story Residence: One story  Living Alone: No  Support Systems: Spouse/Significant Other/Partner, Family member(s)  Patient Expects to be Discharged to[de-identified] Private residence  Current DME Used/Available at Home: Walker, rolling  Tub or Shower Type: Tub/Shower combination  []  Right hand dominant   []  Left hand dominant    Cognitive/Behavioral Status:  Neurologic State: Drowsy  Orientation Level: Oriented X4  Cognition: Follows commands;Decreased attention/concentration  Safety/Judgement: Fall prevention;Home safety    Skin: L hip incision w/ Mepilex   Edema: compression hose in place & applied ice     Coordination: BUE  Coordination: Within functional limits  Fine Motor Skills-Upper: Left Intact; Right Intact    Gross Motor Skills-Upper: Left Intact; Right Intact    Balance:  Sitting: Intact  Standing: Intact; With support    Strength: BUE  Strength: Generally decreased, functional    Tone & Sensation:BUE  Tone: Normal  Sensation: Impaired(L hip)    Range of Motion: BUE  AROM: Generally decreased, functional  PROM: Generally decreased, functional    Functional Mobility and Transfers for ADLs:  Bed Mobility:  Supine to Sit: Stand-by assistance; Additional time  Sit to Supine: Stand-by assistance; Additional time    Transfers:  Sit to Stand: Contact guard assistance(vc)   Toilet Transfer : Contact guard assistance(vc)   Bathroom Mobility: Contact guard assistance(vc)    ADL Assessment:  Feeding: Independent  Oral Facial Hygiene/Grooming: Contact guard assistance  Bathing: Contact guard assistance  Upper Body Dressing: Supervision  Lower Body Dressing: Contact guard assistance  Toileting: Stand by assistance    ADL Intervention:  Upper Body Dressing Assistance  Dressing Assistance: Supervision  Pullover Shirt: Supervision    Lower Body Dressing Assistance  Dressing Assistance: Contact guard assistance  Underpants: Contact guard assistance  Pants With Elastic Waist: Contact guard assistance  Leg Crossed Method Used: No  Position Performed: Other (comment)(seated on toilet)  Cues: Verbal cues provided;Visual cues provided    Toileting  Toileting Assistance: Contact guard assistance  Bladder Hygiene: Stand-by assistance  Clothing Management: Contact guard assistance    Cognitive Retraining  Safety/Judgement: Fall prevention;Home safety    Pain:  Pain level pre-treatment: 1/10  Pain level post-treatment: 2/10  Pain Intervention(s): Medication administer by Nursing (see MAR); Rest, Ice, Repositioning   Response to intervention: Nurse notified, see doc flow     Activity Tolerance:   Fair. Patient able to stand ~10 minute(s). Patient able to complete ADLs with intermittent rest breaks. Patient limited by pain, strength, ROM, drowsiness. Patient unsteady. Please refer to the flowsheet for vital signs taken during this treatment. After treatment:   []  Patient left in no apparent distress sitting up in chair  []  Patient sitting on EOB  [x]  Patient left in no apparent distress in bed  [x]  Call bell left within reach  [x]  Nursing notified  [x]  Caregiver present  [x]  Ice applied  []  SCD's on while back in bed  [] Bed alarm activated    COMMUNICATION/EDUCATION:   Communication/Collaboration:  [x]       Role of Occupational Therapy in the acute care setting.   [x]      Home safety education was provided and the patient/caregiver indicated understanding. [x]      Patient/family have participated as able in goal setting and plan of care. [x]      Patient/family agree to work toward stated goals and plan of care. []      Patient understands intent and goals of therapy, but is neutral about his/her participation. []      Patient is unable to participate in plan of care at this time. Thank you for this referral.  Zach Zapata OTR/L  Time Calculation: 38 mins    Eval Complexity: History: MEDIUM Complexity : Expanded review of history including physical, cognitive and psychosocial  history ; Examination: MEDIUM Complexity : 3-5 performance deficits relating to physical, cognitive , or psychosocial skils that result in activity limitations and / or participation restrictions; Decision Making:MEDIUM Complexity : Patient may present with comorbidities that affect occupational performnce.  Miniml to moderate modification of tasks or assistance (eg, physical or verbal ) with assesment(s) is necessary to enable patient to complete evaluation

## 2021-05-18 NOTE — INTERVAL H&P NOTE
Update History & Physical 
 
The Patient's History and Physical  was reviewed with the patient and I examined the patient. There was no change. The surgical site was confirmed by the patient and me. Plan:  The risk, benefits, expected outcome, and alternative to the recommended procedure have been discussed with the patient. Patient understands and wants to proceed with the procedure.  
 
Electronically signed by Ravindra Patterson DO on 5/18/2021 at 7:31 AM

## 2021-05-18 NOTE — PERIOP NOTES
Discharge instructions given to patient and family member. Understanding verbalized of the instructions and patient is able to use incentive spirometer independently.

## 2021-05-18 NOTE — DISCHARGE INSTRUCTIONS
[facility]  Dr. Reginaldo Whatley Operative Instructions Total Hip Replacement    ACTIVITIES :  1. You may be up and walking about the house with your walker. 2.  Activities around the house, such as washing dishes, fixing light meals, and your own personal care are fine. 3.  Avoid strenuous activities, such as vacuuming, lifting laundry or grocery bags. 4.  Walking is the best way to rebuild strength and stamina. Start SLOWLY and gradually increase your distance. 5.  Avoid any jogging, running or excessive stair-climbing   6. Your home physical therapist will work with you for the first 7-14 days. 7.  Follow-up with Dr. Emma Richardson in 10-14 days. BATHING and INCISION CARE:  1. Do not remove bandage until follow up visit in office. 2. The incision may be tender to touch or feel numb: this is normal.   3.  Keep the incision clean and dry no showering until your follow-up appointment. The incision will be closed with sutures under the skin and the skin will be glued. 4.  Do not apply any lotions, ointments or oils on the incision. 5.  If you notice any excessive swelling, redness, or persistent drainage around the incision, notify the office immediately. DRIVIN. You should not drive until after your follow-up appointment. 2.  You can be in a vehicle for short distances, but if you travel any long distance, please stop about every 30 minutes and walk/stretch. 3.  You should NEVER drive while taking narcotic medication. RETURN TO WORK :  1. The decision to return to work will be determined on an individual basis. 2.  Many people who have a strenuous job (construction, heavy labor, etc) may need to be off work for up to 12 weeks. 3.  If you need a work note, please let us know as soon as possible, and not the same day you are planning to return to work. NUTRITION :  1.  Good nutrition is an essential part of healing.    2.  You should eat a balanced diet each day, including fruits, vegetables, dairy products and protein. 3.  Remember to drink plenty of water. 4.  If you have not had a bowel movement within 3 days of surgery, you will need to use a laxative or suppository that can be obtained over-the-counter at your local pharmacy. MEDICATIONS -  1. You may resume the medications you were taking before surgery. 2.  You will receive a prescription for pain medication at discharge from the hospital. The pain medication works best if taken before the pain becomes severe. 3.  To reduce stomach upset, always take the medication with food. 4.  Begin to wean yourself off the pain medication during the second week after discharge. 5.  If you need a refill, please call the office during working hours at least 2 days before your prescription runs out. Do not wait until your bottle is empty to call for a refill. 6.  You will also be prescribed a blood thinner that you will take by injection for 21 days post-operatively. 7.  DO NOT drive if you are taking narcotic pain medications. HOME HEALTH CARE:  1.   A home health care service has been set-up for you to help assist you once you leave the hospital.  2.  They will contact you either before you leave the hospital or within 24 hours once you have been discharged home. 3. A nurse will assist you with your dressing changes and a Physical Therapist with help you with your therapy needs. CALL THE OFFICE:   If you have severe pain unrelieved by the medications;   If you have a fever of 101.0°F or greater;    If you notice excessive swelling, redness, or persistent drainage from the incision or IV site; [facility] office number is [phone] from 8:00am to 5:00pm Monday through Friday. After 5:00pm, on weekends, or holidays, please leave a message with our answering service and the doctor on-call will get back to you shortly.      DISCHARGE SUMMARY from Nurse    PATIENT INSTRUCTIONS:    After general anesthesia or intravenous sedation, for 24 hours or while taking prescription Narcotics:  · Limit your activities  · Do not drive and operate hazardous machinery  · Do not make important personal or business decisions  · Do  not drink alcoholic beverages  · If you have not urinated within 8 hours after discharge, please contact your surgeon on call. Report the following to your surgeon:  · Excessive pain, swelling, redness or odor of or around the surgical area  · Temperature over 100.5  · Nausea and vomiting lasting longer than 4 hours or if unable to take medications  · Any signs of decreased circulation or nerve impairment to extremity: change in color, persistent  numbness, tingling, coldness or increase pain  · Any questions    What to do at Home:  Recommended activity: Activity as directed by surgeon    If you experience any of the following symptoms, fever, chills, increase in pain or difficulty breathing please follow up with your surgeon. *  Please give a list of your current medications to your Primary Care Provider. *  Please update this list whenever your medications are discontinued, doses are      changed, or new medications (including over-the-counter products) are added. *  Please carry medication information at all times in case of emergency situations. These are general instructions for a healthy lifestyle:    No smoking/ No tobacco products/ Avoid exposure to second hand smoke  Surgeon General's Warning:  Quitting smoking now greatly reduces serious risk to your health.     Obesity, smoking, and sedentary lifestyle greatly increases your risk for illness    A healthy diet, regular physical exercise & weight monitoring are important for maintaining a healthy lifestyle    You may be retaining fluid if you have a history of heart failure or if you experience any of the following symptoms:  Weight gain of 3 pounds or more overnight or 5 pounds in a week, increased swelling in our hands or feet or shortness of breath while lying flat in bed. Please call your doctor as soon as you notice any of these symptoms; do not wait until your next office visit. Patient armband removed and shredded    The discharge information has been reviewed with the patient and caregiver. The {PATIENT PARENT GUARDIAN:57794} verbalized understanding. Discharge medications reviewed with the {Dishcarge meds reviewed OXIF:57393} and appropriate educational materials and side effects teaching were provided.   ___________________________________________________________________________________________________________________________________

## 2021-05-18 NOTE — BRIEF OP NOTE
Brief Postoperative Note    Patient: Antonietta Ring  YOB: 1965  MRN: [id number]    Date of Procedure: 5/18/2021     Pre-Op Diagnosis: OSTEO HIP LEFT    Post-Op Diagnosis: Same as preoperative diagnosis. Procedure(s):  LEFT TOTAL HIP ARTHROPLASTY ANTERIOR APPROACH WITH C-ARM ([employer])    Surgeon(s):  Romayne Butcher, DO    Surgical Assistant: Physician Assistant: Ana María Stafford PA-C    Anesthesia: General     Estimated Blood Loss (mL): 212    Complications: None    Specimens: * No specimens in log *     Implants:   Implant Name Type Inv.  Item Serial No.  Lot No. LRB No. Used Action   LINER ACET OD52MM ID36MM HIP ALTRX PINN - [id number]  LINER ACET OD52MM ID36MM HIP ALTRX PINN  [facility] [employer] [facility] [id number] Left 1 Implanted   CUP ACET [id number] HIP GRIPTION MUKUND CEMENTLESS FIX SECT SER - [id number]  CUP ACET [id number] HIP GRIPTION MUKUND CEMENTLESS FIX SECT SER  [facility] [employer] [facility] [id number] Left 1 Implanted   ELIMINATOR H APEX FOR 48-60MM PINN HIP SHELL - [id number]  ELIMINATOR H APEX FOR 48-60MM PINN HIP SHELL  [facility] [employer] [facility] [id number] Left 1 Implanted   STEM FEM SZ 7 L109MM 12/14 TAPR STD OFFSET HIP DUOFIX CLLRD - [id number]  STEM FEM SZ 7 L109MM 12/14 TAPR STD OFFSET HIP DUOFIX CLLRD  [facility] [employer] [facility] [id number] Left 1 Implanted   HEAD FEM [id number] +8MM OFFSET 12/14 TAPR HIP CERAMIC BIOLOX - [id number]  HEAD FEM [id number] +8MM OFFSET 12/14 TAPR HIP CERAMIC BIOLOX  [facility] [employer] [facility] [id number] Left 1 Implanted       Drains: * No LDAs found *    Findings: severe oa with incarceration of femoral head    Electronically Signed by Kristi Tubbs DO on 5/18/2021 at 12:48 PM

## 2021-05-18 NOTE — OP NOTES
OPERATIVE NOTE    Patient: Ryan Cooper MRN: [id number]  SSN: [id number]    YOB: 1965  Age: 54 y.o. Sex: female      Indications: This is a 54y.o. year-old female who presents with hip pain. She was positive for hip OA. The patient was admitted for surgery as conservative measures have failed. Date of Procedure: 5/18/2021     Preoperative Diagnosis: OSTEO HIP LEFT    Postoperative Diagnosis: OSTEO HIP LEFT      Procedure: Procedure(s):  LEFT TOTAL HIP ARTHROPLASTY ANTERIOR APPROACH WITH C-ARM ([facility])    Anesthesia: general    Surgeon: Monalisa Vallejo, and Surgical Assistant: Cinthia Lopez PA-C    Assistant: Circ-1: Sterling Buitrago RN  Circ-Relief: Constanza Moreira RN  Physician Assistant: Luca Eid PA-C  Scrub Tech-Relief: Norma Petit  Scrub RN-1: Martina Han RN  Scrub RN-2: Chivo Wiley RN    Estimated Blood Loss:  600ml  IVF 2500 ml    Specimens: * No specimens in log *     Drains: none    Implants:   Implant Name Type Inv.  Item Serial No.  Lot No. LRB No. Used Action   LINER ACET OD52MM ID36MM HIP ALTRX PINN - [id number]  LINER ACET OD52MM ID36MM HIP ALTRX PINN  [facility] [place] [id number] Left 1 Implanted   CUP ACET [id number] HIP GRIPTION MUKUND CEMENTLESS FIX SECT SER - [id number]  CUP ACET [id number] HIP GRIPTION MUKUND CEMENTLESS FIX SECT SER  [facility] [place] [id number] Left 1 Implanted   ELIMINATOR H APEX FOR 48-60MM PINN HIP SHELL - [id number]  ELIMINATOR H APEX FOR 48-60MM PINN HIP SHELL  [facility] [place] [id number] Left 1 Implanted   STEM FEM SZ 7 L109MM 12/14 TAPR STD OFFSET HIP DUOFIX CLLRD - [id number]  STEM FEM SZ 7 L109MM 12/14 TAPR STD OFFSET HIP DUOFIX CLLRD  [facility] [place] [id number] Left 1 Implanted   HEAD FEM [id number] +8MM OFFSET 12/14 TAPR HIP CERAMIC BIOLOX - [id number]  HEAD FEM [id number] +8MM OFFSET 12/14 TAPR HIP CERAMIC BIOLOX  [employer] [id number] Left 1 Implanted Complications: None; patient tolerated the procedure well. Procedure: The patient was greeted by anesthesia and taken to the operative suite where he underwent general endotracheal anesthesia. He was positioned on a radiolucent [place] hip table with both feet secured and positioned in holding boots. The left hip was sterilely prepped and draped in standard fashion. A 3.5 inch incision was made just below the ASIS in line with the medial border of the tensor fascia israel. Incision was made and the Tensor was mobilized laterally and the Rectus was mobilized medially. The circumflex vessels were isolated and cauterized to prevent post-operative bleeding. Pre-capsular fat was removed and an anterior H shaped capsulotomy was performed. Retractors were positioned and a femoral neck osteotomy was made with an oscillating saw. The head was dislocated from the acetabulum and the soft tissue labrum was removed with sharp scalpel dissection. Progressive reaming was performed with 45 degrees of abduction and 20 degrees of anteversion. Fluoroscopy was utilized to help confirm appropriate cup placement. A DePuy Sector  52 mm cup was impacted and found to be extremely stable. A single dome hole plug was placed. A 36 liner was placed and implacted and found to be stable. Attention was turned to the femoral shaft. The foot was dropped to the floor, externally rotated 120 degrees and adducted. This exposed the femoral canal nicely with the use of a femoral hook. Progressive broaching was performed until excellent longitudinal and rotational stability was obtained. Trial components were placed and fluoroscopy was utilized to gain excellent leg length equality, hip offset and stability with traction as well as internal and external rotation. Trial components were removed and the tissues were irrigated with 1000 cc of sterile saline with Bacitracin.   The cautery unit was utilized to treat the surrounding soft tissues and prevent post operative bleeding and hematoma formation. Subsequently, a size 7 Depuy Actis femoral component with a standar neck angle was impacted into position. A 36+8.5 head was placed, impacted and reduced into the acetabular shell. Fluoroscopy confirmed excellent placement, leg length equality, hip offset and stability. The surrounding tissues were injected with 30 cc's of .25 percent Marcaine with epi and 30 mg of Exparell Dilute to 100 ml with saline for post operative pain control. There was minimal bleeding and no drain was placed. The Tensor was reapproximated with running Vicryl. The skin edges were reapproximated with 2-0 Vicry and the skin withDermabond Prineo skin sealant as well as a sterile dressing. The patient recovered from anesthesia and was transferred to the post anesthesia care unit in stable condition. A physician assistant was used during the surgery which contributes to better patient outcomes by decreasing operating room time and decreasing the amount of anesthesia the patient had to undergo. The physician assistant helped with positioning of the patient for implantation of implants, retraction of soft tissues so as not to traumatize the tissues. During several parts of the procedure the PA used the simpulse lavage to irrigate/suction the sterile field which contributed to a  surgical field and decrease in infection rates. The physician assistant used the cauterization under my guidance to decrease blood loss which limits the need for blood transfusion in the post operative period. During the procedure the PA was given the task of irrigating the wound allowing myself to prepare the prosthetic for implantation. During closure the physician assistant was able to close the superficial tissues with 2-0 Vicryl sutures. The skin was closed using an Exofin skin closure system so that there was no discharge from the incision.  This helps contribute to a lower risk of infection.      Musa Ortega DO  5/18/2021  12:48 PM

## 2021-05-18 NOTE — ANESTHESIA POSTPROCEDURE EVALUATION
Procedure(s):  LEFT TOTAL HIP ARTHROPLASTY ANTERIOR APPROACH WITH C-ARM ([employer]). general    Anesthesia Post Evaluation        Comments: Post-Anesthesia Evaluation and Assessment    Cardiovascular Function/Vital Signs  BP (!) 95/50   Pulse 82   Temp 36.2 °C (97.2 °F)   Resp 15   Ht 5' 10\" (1.778 m)   Wt 95.4 kg (210 lb 5 oz)   SpO2 95%   BMI 30.18 kg/m²     Patient is status post Procedure(s):  LEFT TOTAL HIP ARTHROPLASTY ANTERIOR APPROACH WITH C-ARM ([employer]). Nausea/Vomiting: Controlled. Postoperative hydration reviewed and adequate. Pain:  Pain Scale 1: Numeric (0 - 10) (05/18/21 1311)  Pain Intensity 1: 0 (05/18/21 1311)   Managed. Neurological Status:   Neuro (WDL): Exceptions to WDL (05/18/21 1311)   At baseline. Mental Status and Level of Consciousness: Arousable. Pulmonary Status:   O2 Device: None (Room air) (05/18/21 1311)   Adequate oxygenation and airway patent. Complications related to anesthesia: None    Post-anesthesia assessment completed. No concerns. Patient has met all discharge requirements. Signed By: Rose Marie Shanks MD    May 18, 2021                     INITIAL Post-op Vital signs:   Vitals Value Taken Time   BP 95/50 05/18/21 1408   Temp 36.2 °C (97.2 °F) 05/18/21 1255   Pulse 80 05/18/21 1407   Resp 14 05/18/21 1407   SpO2 95 % 05/18/21 1407   Vitals shown include unvalidated device data.

## 2021-05-18 NOTE — PROGRESS NOTES
Problem: Mobility Impaired (Adult and Pediatric)  Goal: *Acute Goals and Plan of Care (Insert Text)  Description: Physical Therapy short term goals initiated 05/18/21, to be achieved in 2 days. Pt will:   1. Perform bed mobility with indep in prep for OOB activity. 2. Perform sit <> stand transfers with RW and S in prep for functional mobility and ambulation. 3. Ambulate 100 ft with RW and S in prep for household and community mobility. 4. Ascend/descend 4 stairs with 1 HR and S for safe home entry. Note: [x]  Patient has met MD trent prakash for d/c home   [x]  Recommend HH with 24 hour adult care   []  Benefit from additional acute PT session to address:      PHYSICAL THERAPY EVALUATION    Patient: Sixto Pimentel (89 y.o. female)  Date: 5/18/2021  Primary Diagnosis: OSTEO HIP LEFT  Procedure(s) (LRB):  LEFT TOTAL HIP ARTHROPLASTY ANTERIOR APPROACH WITH C-ARM (DE[facility]) (Left) Day of Surgery   Precautions:  Fall, WBAT  WBAT  PLOF: Pt was independent with household and community mobility with no AD PTA. ASSESSMENT :  Based on the objective data described below, the patient presents with decr L LE strength and ROM, decr activity tolerance, decr balance, and L hip pain resulting in deficits in bed mobility, transfers, and gait. Pt seen with OT for additional set of skilled hands. Pt supine in stretcher on PT entry, rated L hip pain 6/10. Pt drowsy t/o session with reports of nausea, requiring additional time for all mobility tasks. Pt performed bed mobility with SBA. Pt required CGA for sit <> stand transfers with RW and vc for safe hand placement. Pt ambulated to bathroom with Rw and CGA demonstrating decr speed, shuffling steps. After returning from bathroom pt required seated rest break before gait training and stair training in [place]. Pt ascended/descended 1 box step with RW and CGA with vc for sequencing.  Pt educated on importance of early mobility, use of ice to decr pain and inflammation, and home safety.  for safety and appropriate use of RW provided t/o session. Pt left sitting in recliner with all needs met, all questions answered, and ice to L hip. Pt would benefit from additional skilled therapy after discharge in order to incr functional mobility and promote return to PLOF. Recommend HHPT after discharge. Patient will benefit from skilled intervention to address the above impairments. Patient's rehabilitation potential is considered to be Good  Factors which may influence rehabilitation potential include:   []         None noted  []         Mental ability/status  []         Medical condition  []         Home/family situation and support systems  [x]         Safety awareness  []         Pain tolerance/management  []         Other:      PLAN :  Recommendations and Planned Interventions:   [x]           Bed Mobility Training             []    Neuromuscular Re-Education  [x]           Transfer Training                   []    Orthotic/Prosthetic Training  [x]           Gait Training                          [x]    Modalities  [x]           Therapeutic Exercises           []    Edema Management/Control  [x]           Therapeutic Activities            [x]    Family Training/Education  [x]           Patient Education  []           Other (comment):    Frequency/Duration: Patient will be followed by physical therapy twice daily to address goals. Discharge Recommendations: Home Health  Further Equipment Recommendations for Discharge: N/A     SUBJECTIVE:   Patient stated \"I feel sick.     OBJECTIVE DATA SUMMARY:     Past Medical History:   Diagnosis Date    Asthma     Hx of migraines 1/18/2019    Psychiatric disorder     anxiety     Past Surgical History:   Procedure Laterality Date    HX TUBAL LIGATION       Barriers to Learning/Limitations: yes;  other post-anesthesia  Compensate with: Verbal Cues and Tactile Cues  Home Situation:  Home Situation  Home Environment: Private residence  # Steps to Enter: 4  Rails to Enter: Yes  [employer] : Left  One/Two Story Residence: One story  Living Alone: No  Support Systems: Spouse/Significant Other/Partner, Family member(s)  Patient Expects to be Discharged to[de-identified] Private residence  Current DME Used/Available at Home: Walker, rolling  Tub or Shower Type: Tub/Shower combination  Critical Behavior:  Neurologic State: Drowsy  Orientation Level: Oriented X4  Cognition: Follows commands;Decreased attention/concentration  Safety/Judgement: Fall prevention;Home safety  Psychosocial  Patient Behaviors: Calm; Cooperative  Family  Behaviors: Supportive  Family  Behaviors: Supportive  Skin Integrity: Incision (comment)(L hip )  Skin Integumentary  Skin Integrity: Incision (comment)(L hip )  Strength:    Strength: Generally decreased, functional  Tone & Sensation:   Tone: Normal  Sensation: Impaired(L hip)  Range Of Motion:  AROM: Generally decreased, functional  PROM: Generally decreased, functional  Functional Mobility:  Bed Mobility:  Supine to Sit: Stand-by assistance; Additional time  Sit to Supine: Stand-by assistance; Additional time  Transfers:  Sit to Stand: Contact guard assistance(vc)  Stand to Sit: Contact guard assistance(vc)  Balance:   Sitting: Intact  Standing: Intact; With support  Ambulation/Gait Training:  Distance (ft): 80 Feet (ft)  Assistive Device: Gait belt;Walker, rolling  Ambulation - Level of Assistance: Contact guard assistance; Additional time  Gait Abnormalities: Step to gait; Decreased step clearance;Shuffling gait  Stance: Left decreased  Speed/Rita: Slow  Step Length: Left shortened;Right shortened  Interventions: Safety awareness training; Tactile cues; Verbal cues  Stairs:  Number of Stairs Trained: 1(box step)  Stairs - Level of Assistance: Contact guard assistance(vc)  Rail Use: (RW)  Pain:  Pain level pre-treatment: 1/10   Pain level post-treatment: 1/10   Pain Intervention(s) : Medication (see MAR);  Rest, Ice, Repositioning  Response to intervention: Nurse notified, See doc flow  Activity Tolerance:   Pt's activity tolerance limited by drowsiness and nausea, pt required additional time for all mobility tasks d/t same, rated L hip pain 1/10 t/o session. Please refer to the flowsheet for vital signs taken during this treatment. After treatment:   []         Patient left in no apparent distress sitting up in chair  [x]         Patient left in no apparent distress in bed  [x]         Call bell left within reach  [x]         Nursing notified  []         Caregiver present  []         Bed alarm activated  []         SCDs applied    COMMUNICATION/EDUCATION:   [x]         Role of Physical Therapy in the acute care setting. [x]         Fall prevention education was provided and the patient/caregiver indicated understanding. [x]         Patient/family have participated as able in goal setting and plan of care. [x]         Patient/family agree to work toward stated goals and plan of care. []         Patient understands intent and goals of therapy, but is neutral about his/her participation. []         Patient is unable to participate in goal setting/plan of care: ongoing with therapy staff.  []         Other:     Thank you for this referral.  Ayanna Serum   Time Calculation: 38 mins      Eval Complexity: History: MEDIUM  Complexity : 1-2 comorbidities / personal factors will impact the outcome/ POC Exam:MEDIUM Complexity : 3 Standardized tests and measures addressing body structure, function, activity limitation and / or participation in recreation  Presentation: MEDIUM Complexity : Evolving with changing characteristics  Clinical Decision Making:Medium Complexity    Overall Complexity:MEDIUM

## 2021-05-19 ENCOUNTER — TELEPHONE (OUTPATIENT)
Dept: OTHER | Age: 56
End: 2021-05-19

## 2021-06-29 ENCOUNTER — APPOINTMENT (OUTPATIENT)
Dept: PHYSICAL THERAPY | Age: 56
End: 2021-06-29

## 2021-07-07 ENCOUNTER — DOCUMENTATION ONLY (OUTPATIENT)
Dept: FAMILY MEDICINE CLINIC | Age: 56
End: 2021-07-07

## 2021-07-07 NOTE — PROGRESS NOTES
Pt seen by Dr Nathan Duong to follow up for total left hip replacement that took place about a month ago (around beginngin of June 2021). She is ambulating with a walker and finished home PT. She has been undergoing outpatient PT.  She will follow up in another month

## 2021-07-18 DIAGNOSIS — M51.36 DDD (DEGENERATIVE DISC DISEASE), LUMBAR: ICD-10-CM

## 2021-07-18 DIAGNOSIS — M50.30 DDD (DEGENERATIVE DISC DISEASE), CERVICAL: ICD-10-CM

## 2021-07-18 RX ORDER — PREGABALIN 300 MG/1
300 CAPSULE ORAL 2 TIMES DAILY
Qty: 60 CAPSULE | Refills: 2 | Status: SHIPPED | OUTPATIENT
Start: 2021-07-18 | End: 2021-09-20 | Stop reason: DRUGHIGH

## 2021-07-28 ENCOUNTER — HOSPITAL ENCOUNTER (OUTPATIENT)
Dept: PHYSICAL THERAPY | Age: 56
Discharge: HOME | End: 2021-07-28
Payer: MEDICAID

## 2021-07-28 PROCEDURE — 97161 PT EVAL LOW COMPLEX 20 MIN: CPT

## 2021-07-28 NOTE — PROGRESS NOTES
[facility]  [address] [facility], [facility] [facility]:  [phone]  FAX: [phone]      Plan of Care/ Statement of Necessity for Physical Therapy Services    Patient name: Jayne Lipscomb Start of Care: 2021   Referral source: Enedina Burris DO : 1965    Medical Diagnosis: Pain in left hip [M25.552]   Onset Date:21    Treatment Diagnosis: L hip pain, L LE weakness   Prior Hospitalization: see medical history Provider#: [id number]   Medications: Verified on Patient summary List    Comorbidities: arthritis   Prior Level of Function: cares for home and 4 children, 1 child autistic, gardens, does not drive, was ambulating without a device     The Plan of Care and following information is based on the information from the initial evaluation. Assessment/ key information: Patient presents with LLE weakness and intermittent hip pain. She will benefit from skilled PT to increase her strength and decrease her pain.     Evaluation Complexity History MEDIUM  Complexity : 1-2 comorbidities / personal factors will impact the outcome/ POC ; Examination MEDIUM Complexity : 3 Standardized tests and measures addressing body structure, function, activity limitation and / or participation in recreation  ;Presentation LOW Complexity : Stable, uncomplicated  ;Clinical Decision Making LOW Complexity : FOTO score of   Overall Complexity Rating: LOW   Problem List: pain affecting function, decrease ROM, decrease strength and impaired gait/ balance   Treatment Plan may include any combination of the following: Therapeutic exercise, Therapeutic activities, Neuromuscular re-education, Physical agent/modality, Gait/balance training, Manual therapy, Patient education and Functional mobility training  Patient / Family readiness to learn indicated by: asking questions, trying to perform skills and interest  Persons(s) to be included in education: patient (P)  Barriers to Learning/Limitations: None  Patient Goal (s): normal life- care for kids, home, garden, not have pain all the time  Patient Self Reported Health Status: good  Rehabilitation Potential: good    Short Term Goals: To be accomplished in 8 treatments:   1. Patient will report she is able to perform housework with worst hip pain being a 4/10 or less. 2.  Patient will report she is able to solitario and doff her shoes without difficulty. 3.  Patient will ambulate without gait deviations on level surfaces. Long Term Goals: To be accomplished in 16 treatments:   1. Patient will report she has returned to prior activities without pain. 2.  Patient will report she is able to perform all activities around her home without difficulty. 3.  Patient will ascend and descend 3 steps reciprocally without use of hands. Frequency / Duration: Patient to be seen 1-3 times per week for 16 treatments. Patient/ Caregiver education and instruction: Diagnosis, prognosis, exercises   [x]  Plan of care has been reviewed with PTA    Nga Devine, PT 7/28/2021     ________________________________________________________________________    I certify that the above Therapy Services are being furnished while the patient is under my care. I agree with the treatment plan and certify that this therapy is necessary. [de-identified] Signature:____________________  Date:____________Time: _________           Alphonso Solo,     Please sign and return to:  [address] [facility]  [address] [place], [place], [address] [facility]:  [address] [place] [address] Street: [phone]

## 2021-07-28 NOTE — PROGRESS NOTES
PT INITIAL EVALUATION NOTE - [facility] 2-15    Patient Name: Esperanza Porter  Date:2021  : 1965  [x]  Patient  Verified  Payor: Nilda Marroquin / Plan: [id number] [employer] / Product Type: Managed Care Medicaid /    In time:1130  Out time:1210  Total Treatment Time (min): 40  Total Timed Codes (min): 40  1:1 Treatment Time ([facility] only): 40   Visit #: 1     Treatment Area: Pain in left hip [M25.552]    SUBJECTIVE  Pain Level (0-10 scale): 0-10/10, depending on the activity  Any medication changes, allergies to medications, adverse drug reactions, diagnosis change, or new procedure performed?: [] No    [x] Yes (see summary sheet for update)  Subjective:    L hip replacement 21, pt had HHPT for 1 month, intermittent L hip pain with mobility. Patient does not drive. She has 4 kids- 13 years to 4 y/o. Challenges donning and doffing shoes     PLOF: cares for family, has 1 child with autism  Living Situation: with family, 4 steps into house  Pt Goals: \"normal life- garden, not have pain all the time, care for home, care for kids\"  Motivation: good  Cognition: A & O x 4        OBJECTIVE/EXAMINATION  Posture:  Stands with hips flexed  Other Observations:  Healed ant scar  Gait and Functional Mobility:  Ambulates ind without a device with decreased stance time on LLE  Palpation: minimal tenderness surrounding scar and ant hip muscles   ROM: AROM L hip flex 80     MMT: L LE grossly 4/5, RLE 5/5  Neurological: Reflexes / Sensations: intact    5 min Therapeutic Exercise:  [x] See flow sheet :reviewed current HEP   Rationale: increase ROM and increase strength to improve the patients ability to perform functional activities    With   [] TE   [] TA   [] neuro   [] other: Patient Education: [x] Review HEP    [] Progressed/Changed HEP based on:   [] positioning   [] body mechanics   [] transfers   [] heat/ice application    [] other:      Other Objective/Functional Measures:FOTO 58%impairment    Pain Level (0-10 scale) post treatment: 0-10/10    ASSESSMENT/Changes in Function:     [x]  See Plan of [address], PT 7/28/2021

## 2021-09-20 ENCOUNTER — OFFICE VISIT (OUTPATIENT)
Dept: FAMILY MEDICINE CLINIC | Age: 56
End: 2021-09-20
Payer: MEDICAID

## 2021-09-20 VITALS
HEART RATE: 86 BPM | TEMPERATURE: 97.6 F | RESPIRATION RATE: 18 BRPM | WEIGHT: 209.5 LBS | BODY MASS INDEX: 29.99 KG/M2 | HEIGHT: 70 IN | DIASTOLIC BLOOD PRESSURE: 69 MMHG | OXYGEN SATURATION: 99 % | SYSTOLIC BLOOD PRESSURE: 109 MMHG

## 2021-09-20 DIAGNOSIS — F41.9 ANXIETY: ICD-10-CM

## 2021-09-20 DIAGNOSIS — E04.9 NODULAR GOITER: ICD-10-CM

## 2021-09-20 DIAGNOSIS — M85.89 OSTEOPENIA OF MULTIPLE SITES: ICD-10-CM

## 2021-09-20 DIAGNOSIS — F51.04 PSYCHOPHYSIOLOGICAL INSOMNIA: ICD-10-CM

## 2021-09-20 DIAGNOSIS — M50.30 DDD (DEGENERATIVE DISC DISEASE), CERVICAL: Primary | ICD-10-CM

## 2021-09-20 DIAGNOSIS — K76.0 FATTY LIVER: ICD-10-CM

## 2021-09-20 DIAGNOSIS — K21.9 GASTROESOPHAGEAL REFLUX DISEASE, UNSPECIFIED WHETHER ESOPHAGITIS PRESENT: ICD-10-CM

## 2021-09-20 DIAGNOSIS — E66.1 CLASS 1 DRUG-INDUCED OBESITY WITHOUT SERIOUS COMORBIDITY WITH BODY MASS INDEX (BMI) OF 30.0 TO 30.9 IN ADULT: ICD-10-CM

## 2021-09-20 DIAGNOSIS — R41.840 POOR CONCENTRATION: ICD-10-CM

## 2021-09-20 PROCEDURE — 99214 OFFICE O/P EST MOD 30 MIN: CPT | Performed by: NURSE PRACTITIONER

## 2021-09-20 RX ORDER — CYCLOBENZAPRINE HCL 10 MG
10 TABLET ORAL
Qty: 60 TABLET | Refills: 0 | Status: SHIPPED | OUTPATIENT
Start: 2021-09-20 | End: 2022-06-13 | Stop reason: SDUPTHER

## 2021-09-20 NOTE — PROGRESS NOTES
1. Have you been to the ER, urgent care clinic since your last visit? Hospitalized since your last visit? No    2. Have you seen or consulted any other health care providers outside of the [address] [place] [address] since your last visit? Include any pap smears or colon screening.  Yes When: PT 8-19-21

## 2021-09-20 NOTE — PROGRESS NOTES
Subjective:     CC: neck pain    oLre Nance is a 64 y.o. female with drug seeking behavior who presents today with c/o chronic neck pain. Pain is located in posterior neck and radiates down both arms all the way to the thumbs. Reports neck muscle spasms, upper extremity paresthesia and weakness- she often drops things. She has also noticed \"puffiness\" around the base of her neck in her chest. It is tender to the touch. Symptoms started 2 months ago and getting worse. Pain also radiated up the back of her head causing headaches. She is currently alternating between tylenol 650 and advil 250 PRN. She is also on lyrica 300mg BID for hx of low back and hip pain. She had a total left hip replacement that took place in June 2021 by Dr So Culp. Moving around a lot better but still has back pain. States she never went to PT after the surgery. Did it herself. 2017- xray of cervical spine showed: Mild degenerative change resulting in right neural foraminal narrowing at C3-4 and C5-6.    2019- xrays of bilateral shoulder shows mild OA    Obesity  Since starting the Lyrica she has gained 25 pounds and cannot lose weight. Would like to take something for weight loss. She has cut out sodas and energy drinks and fried foods and has been more active since her hip surgery but cannot lose weight. She also mentions she cannot stay focused and would like to take something for it. Believes she has ADHD because 2 of her children and several of her grandchildren have ADHD. She also reports a lot of anxiety. Says her stress level is \"through the [employer]. \" She is on Effexor daily and takes Remeron prn insomnia. Goiter  She had a thyroid nodules ultrasound done in 4/2021 that showed multinodular goiter. She was referred to Endo but never scheduled an appt. TSH has been normal.    Osteopenia. DEXA done in 9/2020. She is on a Vit D pill daily. Does not smoke.      Acid reflux  She is on a PPI    HLD  She is not on a statin, trying to control with diet. Lab Results   Component Value Date/Time    Cholesterol, total 260 (H) 03/15/2021 08:15 PM    HDL Cholesterol 52 03/15/2021 08:15 PM    LDL, calculated 170.8 (H) 03/15/2021 08:15 PM    VLDL, calculated 37.2 03/15/2021 08:15 PM    Triglyceride 186 (H) 03/15/2021 08:15 PM    CHOL/HDL Ratio 5.0 03/15/2021 08:15 PM     Fatty liver  She had a ultrasound in  that showed normal size liver with fatty infiltration. Lab Results   Component Value Date/Time    ALT (SGPT) 35 03/15/2021 08:15 PM    AST (SGOT) 18 03/15/2021 08:15 PM    Alk. phosphatase 117 03/15/2021 08:15 PM    Bilirubin, total 0.3 03/15/2021 08:15 PM           Patient Active Problem List   Diagnosis Code    Anxiety F41.9    Lumbar spine scoliosis M41.9    Milk allergy Z91.011    Mild intermittent asthma without complication N80.96    Environmental and seasonal allergies J30.89    Psychophysiological insomnia F51.04    Acute midline low back pain with bilateral sciatica M54.42, M54.41    Osteoarthritis of left hip M16.12    Drug-seeking behavior Z76.5    DDD (degenerative disc disease), cervical M50.30    DDD (degenerative disc disease), lumbar M51.36    Hyperlipidemia E78.5    Osteopenia of multiple sites M85.89    Gastroesophageal reflux disease K21.9    Elevated ALT measurement R74.01    Fatty liver K76.0    Frequent falls R29.6    Nodular goiter E04.9    Carotid stenosis, right I65.21       Past Medical History:   Diagnosis Date    Asthma     H/O total hip arthroplasty 06/2021    LEFT HIP, Dr. David Paez Hx of migraines 1/18/2019    Psychiatric disorder     anxiety         Current Outpatient Medications:     pregabalin (LYRICA) 300 mg capsule, Take 1 Cap by mouth two (2) times a day.  Max Daily Amount: 600 mg., Disp: 60 Capsule, Rfl: 2    omeprazole (PRILOSEC) 20 mg capsule, TAKE 1 CAPSULE BY MOUTH ONCE DAILY, Disp: 90 Capsule, Rfl: 1    cetirizine (ZYRTEC) 10 mg tablet, TAKE 1 TABLET BY MOUTH EVERY DAY, Disp: 90 Tab, Rfl: 1    ascorbic acid, vitamin C, (Vitamin C) 500 mg tablet, Take 500 mg by mouth daily. , Disp: , Rfl:     mv-min/iron/folic/calcium/vitK (WOMEN'S MULTIVITAMIN PO), Take  by mouth daily. , Disp: , Rfl:     ergocalciferol, vitamin D2, (VITAMIN D2 PO), Take  by mouth., Disp: , Rfl:     atorvastatin (LIPITOR) 20 mg tablet, Take 1 Tab by mouth daily. (Patient taking differently: Take 20 mg by mouth nightly.), Disp: 30 Tab, Rfl: 2    aspirin delayed-release 81 mg tablet, Take 1 Tab by mouth daily. , Disp: 90 Tab, Rfl: 1    venlafaxine-SR (EFFEXOR-XR) 150 mg capsule, TAKE 1 CAPSULE BY MOUTH EVERY DAY (Patient taking differently: nightly.), Disp: 90 Cap, Rfl: 1    mirtazapine (REMERON) 15 mg tablet, Take 1 Tab by mouth nightly., Disp: 90 Tab, Rfl: 1    ondansetron (ZOFRAN ODT) 8 mg disintegrating tablet, Take 1 Tab by mouth every eight (8) hours as needed for Nausea., Disp: 20 Tab, Rfl: 0    albuterol (PROVENTIL HFA, VENTOLIN HFA, PROAIR HFA) 90 mcg/actuation inhaler, Take 2 Puffs by inhalation every four (4) hours as needed for Wheezing.  Indications: asthma attack, Disp: 1 Inhaler, Rfl: 3    Allergies   Allergen Reactions    Tramadol Itching       Past Surgical History:   Procedure Laterality Date    HX TUBAL LIGATION         Social History     Tobacco Use   Smoking Status Never Smoker   Smokeless Tobacco Never Used       Social History     Socioeconomic History    Marital status:      Spouse name: Not on file    Number of children: Not on file    Years of education: Not on file    Highest education level: Not on file   Tobacco Use    Smoking status: Never Smoker    Smokeless tobacco: Never Used   Substance and Sexual Activity    Alcohol use: No    Drug use: No    Sexual activity: Yes     Social Determinants of Health     Financial Resource Strain:     Difficulty of Paying Living Expenses:    Food Insecurity:     Worried About Running Out of Food in the Last Year:     Ran Out of Food in the Last Year:    Transportation Needs:     Lack of Transportation (Medical):      Lack of Transportation (Non-Medical):    Physical Activity:     Days of Exercise per Week:     Minutes of Exercise per Session:    Stress:     Feeling of Stress :    Social Connections:     Frequency of Communication with Friends and Family:     Frequency of Social Gatherings with Friends and Family:     Attends Hindu Services:     Active Member of Clubs or Organizations:     Attends Club or Organization Meetings:     Marital Status:        Family History   Problem Relation Age of Onset    Cancer Mother     Diabetes Mother     Hypertension Mother     High Cholesterol Mother     Cancer Father     Diabetes Father     Hypertension Father     High Cholesterol Father     Cancer Maternal Grandmother     Diabetes Maternal Grandmother     Hypertension Maternal Grandmother     High Cholesterol Maternal Grandmother     Cancer Maternal Grandfather     Diabetes Maternal Grandfather     Hypertension Maternal Grandfather     High Cholesterol Maternal Grandfather     Hypertension Paternal Grandmother     High Cholesterol Paternal Grandmother     Diabetes Paternal Grandmother     Cancer Paternal Grandmother     High Cholesterol Paternal Grandfather     Hypertension Paternal Grandfather     Diabetes Paternal Grandfather     Cancer Paternal Grandfather        ROS:  Gen: denies fever or chills, + weight gain and  fatigue  HEENT:+puffiness around the base of the neck/ upper chest  Denies H/A, vision changes, nasal congestion, or sore throat  Resp: denies dyspnea, cough, or wheezing  CV: denies chest pain, no pressure, or palpitations  Extremeties: denies edema, pallor, or cyanosis  GI[de-identified] denies abdominal pain, nausea, vomiting, diarrhea, or constipation  : denies dysuria, hematuria, urinary frequency or urgency  Musculoskeletal: +chronic neck and bilateral shoulder pain, +chronic low back pain Neuro: + numbness/tingling and weakness in the hands, denies dizziness  Endo: +hot flashes  Skin: denies rashes or new lesions   Psych: + anxiety, depression, poor concentration, and insomnia    Objective:     Visit Vitals  /69 (BP 1 Location: Left upper arm)   Pulse 86   Temp 97.6 °F (36.4 °C) (Temporal)   Resp 18   Ht 5' 10\" (1.778 m)   Wt 209 lb 8 oz (95 kg)   SpO2 99%   BMI 30.06 kg/m²       General: Alert and oriented. No acute distress. Well nourished. HEENT :  Eyes: Sclera white, conjunctiva clear. PERRLA. Extra ocular movements intact. Neck: +TTP to cervical spine and paraspinal muscles, limited ROM due to pain  Lungs: Breathing even and unlabored. All lobes clear to auscultation bilaterally   Heart :RRR, S1 and S2 normal intensity, no extra heart sounds  Extremities: Non-edematous  Back: +TTP with limited ROM due to pain  Neuro: Cranial nerves grossly normal.  Sensory: Sensation intact. Motor: Good  strength bilaterally  Psych: Mood is pleasant, but she tends to exaggerate her pain and requests multiple controlled substances- wants a prescription for weight loss and ADD and anxiety. Dressed appropriately but with poor hygiene. Skin: Warm, dry, and intact. Assessment/ Plan:     Cervicalgia with radiculopathy  Get cervical spinal xray  Start Flexeril 10mg TID prn pain and spasms  Referred to PT  F/U 1 month    Chronic pain - neck and lower back  May cont Lyrica but we discussed its interference on her ability to lose weight. She is agreeable to lowering the dose or possibly weaning off if possible  Controlled substance agreement signed today  UDS was ordered but she states she did not take the [address] for the past 2 days because she had a \"touch of the flu. \" She was instructed to give a urine sample anyways as we still have to screen for illegal drugs but she states she cannot void today and would like to come back tomorrow. Told her to come back tomorrow.  I did not give her a urine cup and told her she has to use the bathroom here. No refills given today until UDS done and results negative    Obesity  She has multiple complaints today so I told her we are going to address one problem at a time. Discussed option of starting COntrave in the future but she would have to wean off Lyrica since she is also on Effexor to prevent drug interactions  Cont to avoid sweets, carbs, fried foods, and sodas   Cont exercise   Weigh self daily  F/U 1 month    Goiter  She had a thyroid nodules ultrasound done in 4/2021 that showed multinodular goiter. She was referred to Endo but never scheduled an appt. TSH has been normal  New referral to Endo placed- Dr Sarah Miller daily and [place] [facility] prn insomnia  Will address one problem at a time  F/U 1 month    Poor concentration  She does not work, states  is the [employer] she has ADD and would like to take something for it  Poor concentration can be a symptom of anxiety so she would need to see a specialist for formal testing first   Told her we are addressing one problem at a time so we will discuss this more at follow up appt in 1 month    Osteopenia  DEXA 9/2020 showed osteopenia  Cont Vit D pill  May repeat DEXA in 9/2022    GERD  Cont ppi  Avoid triggers    HLD  Cont ow fat diet  Will recheck lipids another visit when fasting  Go to ER for CP or SOB    Fatty liver  Asymptomatic  Cont low fat diet      Verbal and written instructions (see AVS) provided.  Patient expresses understanding of diagnosis and treatment plan. Health Maintenance Due   Topic Date Due    COVID-19 Vaccine (1) Never done    DTaP/Tdap/Td series (1 - Tdap) Never done    Cervical cancer screen  Never done    Colorectal Cancer Screening Combo  Never done    Shingrix Vaccine Age 50> (1 of 2) Never done    Breast Cancer Screen Mammogram  02/22/2018    Flu Vaccine (1) 09/01/2021               Rosalino Reed, NP

## 2021-09-21 ENCOUNTER — DOCUMENTATION ONLY (OUTPATIENT)
Dept: FAMILY MEDICINE CLINIC | Age: 56
End: 2021-09-21

## 2021-09-21 DIAGNOSIS — E04.9 NODULAR GOITER: Primary | ICD-10-CM

## 2021-10-20 ENCOUNTER — OFFICE VISIT (OUTPATIENT)
Dept: FAMILY MEDICINE CLINIC | Age: 56
End: 2021-10-20
Payer: MEDICAID

## 2021-10-20 VITALS
SYSTOLIC BLOOD PRESSURE: 100 MMHG | OXYGEN SATURATION: 98 % | WEIGHT: 200.13 LBS | DIASTOLIC BLOOD PRESSURE: 63 MMHG | BODY MASS INDEX: 28.65 KG/M2 | TEMPERATURE: 97.7 F | RESPIRATION RATE: 18 BRPM | HEART RATE: 79 BPM | HEIGHT: 70 IN

## 2021-10-20 DIAGNOSIS — L08.9 INFECTION OF SKIN OF FINGER: ICD-10-CM

## 2021-10-20 DIAGNOSIS — M51.36 DDD (DEGENERATIVE DISC DISEASE), LUMBAR: ICD-10-CM

## 2021-10-20 DIAGNOSIS — Z12.31 ENCOUNTER FOR SCREENING MAMMOGRAM FOR MALIGNANT NEOPLASM OF BREAST: ICD-10-CM

## 2021-10-20 DIAGNOSIS — E78.5 HYPERLIPIDEMIA, UNSPECIFIED HYPERLIPIDEMIA TYPE: ICD-10-CM

## 2021-10-20 DIAGNOSIS — L20.84 INTRINSIC ECZEMA: ICD-10-CM

## 2021-10-20 DIAGNOSIS — M50.30 DDD (DEGENERATIVE DISC DISEASE), CERVICAL: Primary | ICD-10-CM

## 2021-10-20 DIAGNOSIS — K76.0 FATTY LIVER: ICD-10-CM

## 2021-10-20 DIAGNOSIS — M85.89 OSTEOPENIA OF MULTIPLE SITES: ICD-10-CM

## 2021-10-20 PROCEDURE — 99214 OFFICE O/P EST MOD 30 MIN: CPT | Performed by: NURSE PRACTITIONER

## 2021-10-20 RX ORDER — TRIAMCINOLONE ACETONIDE 1 MG/G
OINTMENT TOPICAL
Qty: 80 G | Refills: 0 | Status: SHIPPED | OUTPATIENT
Start: 2021-10-20 | End: 2021-12-17

## 2021-10-20 RX ORDER — DOXYCYCLINE 100 MG/1
100 TABLET ORAL 2 TIMES DAILY
Qty: 20 TABLET | Refills: 0 | Status: SHIPPED | OUTPATIENT
Start: 2021-10-20 | End: 2021-10-30

## 2021-10-20 RX ORDER — PREGABALIN 200 MG/1
200 CAPSULE ORAL 2 TIMES DAILY
Qty: 60 CAPSULE | Refills: 0 | Status: SHIPPED | OUTPATIENT
Start: 2021-10-20 | End: 2021-11-13

## 2021-10-20 NOTE — PROGRESS NOTES
1. Have you been to the ER, urgent care clinic since your last visit? Hospitalized since your last visit? No    2. Have you seen or consulted any other health care providers outside of the [address] [place] [address] since your last visit? Include any pap smears or colon screening.  No     Chief Complaint   Patient presents with    GERD    Cholesterol Problem    Thyroid Problem    Finger Pain     right index finger     Visit Vitals  /63 (BP 1 Location: Left arm, BP Patient Position: Sitting)   Pulse 79   Temp 97.7 °F (36.5 °C) (Temporal)   Resp 18   Ht 5' 10\" (1.778 m)   Wt 200 lb 2 oz (90.8 kg)   SpO2 98%   BMI 28.71 kg/m²

## 2021-10-20 NOTE — PROGRESS NOTES
Subjective:     CC: neck pain follow up, new finger pain    Emerita Fuentes is a 64 y.o. female with drug seeking behavior who presents today to follow up for chronic neck pain with radiculopathy. At last OV 1 month ago she was c/o pain in posterior neck radiates to both arms all the way to the thumbs. Reports neck muscle spasms, upper extremity paresthesia and weakness- she often drops things. She has also noticed \"puffiness\" around the base of her neck in her chest. It is tender to the touch. Symptoms started 2 months prior and getting worse. Pain also radiated up the back of her head causing headaches. She was alternating between tylenol 650 and advil 250 PRN. She was taking lyrica 300mg BID for hx of low back and hip pain. 2017- xray of cervical spine showed: Mild degenerative change resulting in right neural foraminal narrowing at C3-4 and C5-6.    2019- xrays of bilateral shoulder shows mild OA    She was advised to get an updated cervical spinal xray, but this was not done. She was also referred to PT but did not go. She was prescribed Flexeril 10mg TID prn pain and spasms. States it helped some but did not resolve the pain. She was unable to get refills of her Lyrica because she was unable to provide a urine sample for her drug screen and never returned until today to complete it. States she is still taking her Lyrica 300mg BID, had some at home. New issues: finger pain  The distal portion of her right index finger is red, warm, tender, and slightly swollen. Denies any injury but there appears to be a splinter under the skin. She has not tried anything for her symptoms. No drainage. She mentions her eczema is flaring on the backs of her upper arms. She uses Dove body wash and Aveeno moisturizer mixed with cortisone cream.     She continues to c/o poor concentration and would like to take something for it. Believes she has ADHD because 2 of her children and several of her grandchildren have ADHD. She also reports a lot of anxiety. Says her stress level is \"through the roof. \" She is on Effexor daily and takes Remeron prn insomnia. Also on Lyrica for chronic pain. I explained to her we cannot add anything else that will put her at risk for serotonin syndrome. She would like to wean off Lyrica due to weight gain. She is willing to give a urine sample today to get refills for a lower dose. Goiter  She had a thyroid nodules ultrasound done in 4/2021 that showed multinodular goiter. She was referred to Endo but never scheduled an appt. TSH has been normal.    Finally, she needs labs to complement her last visit as she was not fasting at the time. Patient Active Problem List   Diagnosis Code    Anxiety F41.9    Lumbar spine scoliosis M41.9    Milk allergy Z91.011    Mild intermittent asthma without complication T71.61    Environmental and seasonal allergies J30.89    Psychophysiological insomnia F51.04    Acute midline low back pain with bilateral sciatica M54.42, M54.41    Osteoarthritis of left hip M16.12    Drug-seeking behavior Z76.5    DDD (degenerative disc disease), cervical M50.30    DDD (degenerative disc disease), lumbar M51.36    Hyperlipidemia E78.5    Osteopenia of multiple sites M85.89    Gastroesophageal reflux disease K21.9    Elevated ALT measurement R74.01    Fatty liver K76.0    Frequent falls R29.6    Nodular goiter E04.9    Carotid stenosis, right I65.21       Past Medical History:   Diagnosis Date    Asthma     H/O total hip arthroplasty 06/2021    LEFT HIP, Dr. Radha Valente Hx of migraines 1/18/2019    Psychiatric disorder     anxiety         Current Outpatient Medications:     venlafaxine-SR (EFFEXOR-XR) 150 mg capsule, Take 1 Capsule by mouth daily. , Disp: 90 Capsule, Rfl: 1    mirtazapine (REMERON) 15 mg tablet, TAKE 1 TABLET BY MOUTH AT BEDTIME, Disp: 90 Tablet, Rfl: 1    aspirin delayed-release 81 mg tablet, TAKE 1 TABLET BY MOUTH ONCE DAILY, Disp: 90 Tablet, Rfl: 1    cyclobenzaprine (FLEXERIL) 10 mg tablet, Take 1 Tablet by mouth two (2) times daily as needed for Muscle Spasm(s). , Disp: 60 Tablet, Rfl: 0    omeprazole (PRILOSEC) 20 mg capsule, TAKE 1 CAPSULE BY MOUTH ONCE DAILY, Disp: 90 Capsule, Rfl: 1    cetirizine (ZYRTEC) 10 mg tablet, TAKE 1 TABLET BY MOUTH EVERY DAY, Disp: 90 Tab, Rfl: 1    ascorbic acid, vitamin C, (Vitamin C) 500 mg tablet, Take 500 mg by mouth daily. , Disp: , Rfl:     mv-min/iron/folic/calcium/vitK (WOMEN'S MULTIVITAMIN PO), Take  by mouth daily. , Disp: , Rfl:     ergocalciferol, vitamin D2, (VITAMIN D2 PO), Take  by mouth., Disp: , Rfl:     atorvastatin (LIPITOR) 20 mg tablet, Take 1 Tab by mouth daily. (Patient taking differently: Take 20 mg by mouth nightly.), Disp: 30 Tab, Rfl: 2    ondansetron (ZOFRAN ODT) 8 mg disintegrating tablet, Take 1 Tab by mouth every eight (8) hours as needed for Nausea., Disp: 20 Tab, Rfl: 0    albuterol (PROVENTIL HFA, VENTOLIN HFA, PROAIR HFA) 90 mcg/actuation inhaler, Take 2 Puffs by inhalation every four (4) hours as needed for Wheezing.  Indications: asthma attack, Disp: 1 Inhaler, Rfl: 3    Allergies   Allergen Reactions    Tramadol Itching       Past Surgical History:   Procedure Laterality Date    HX TUBAL LIGATION         Social History     Tobacco Use   Smoking Status Never Smoker   Smokeless Tobacco Never Used       Social History     Socioeconomic History    Marital status:      Spouse name: Not on file    Number of children: Not on file    Years of education: Not on file    Highest education level: Not on file   Tobacco Use    Smoking status: Never Smoker    Smokeless tobacco: Never Used   Substance and Sexual Activity    Alcohol use: No    Drug use: No    Sexual activity: Yes     Social Determinants of Health     Financial Resource Strain:     Difficulty of Paying Living Expenses:    Food Insecurity:     Worried About Running Out of Food in the Last Year:     Ran Out of Food in the Last Year:    Transportation Needs:     Lack of Transportation (Medical):      Lack of Transportation (Non-Medical):    Physical Activity:     Days of Exercise per Week:     Minutes of Exercise per Session:    Stress:     Feeling of Stress :    Social Connections:     Frequency of Communication with Friends and Family:     Frequency of Social Gatherings with Friends and Family:     Attends Mosque Services:     Active Member of Clubs or Organizations:     Attends Club or Organization Meetings:     Marital Status:        Family History   Problem Relation Age of Onset    Cancer Mother     Diabetes Mother     Hypertension Mother     High Cholesterol Mother     Cancer Father     Diabetes Father     Hypertension Father     High Cholesterol Father     Cancer Maternal Grandmother     Diabetes Maternal Grandmother     Hypertension Maternal Grandmother     High Cholesterol Maternal Grandmother     Cancer Maternal Grandfather     Diabetes Maternal Grandfather     Hypertension Maternal Grandfather     High Cholesterol Maternal Grandfather     Hypertension Paternal Grandmother     High Cholesterol Paternal Grandmother     Diabetes Paternal Grandmother     Cancer Paternal Grandmother     High Cholesterol Paternal Grandfather     Hypertension Paternal Grandfather     Diabetes Paternal Grandfather     Cancer Paternal Grandfather        ROS:  Gen: denies fever or chills, + weight gain and fatigue  HEENT:+puffiness around the base of the neck/ upper chest  Denies H/A, vision changes, nasal congestion, or sore throat  Resp: denies dyspnea, cough, or wheezing  CV: denies chest pain, no pressure, or palpitations  Extremeties: denies edema, pallor, or cyanosis  GI[de-identified] denies abdominal pain, nausea, vomiting, diarrhea, or constipation  : denies dysuria, hematuria, urinary frequency or urgency  Musculoskeletal: +chronic neck and bilateral shoulder pain, +chronic low back pain Neuro: + numbness/tingling and weakness in the hands, denies dizziness  Endo: +hot flashes  Skin: +dry skin to upper arms, +right finger is red, hot, tender, and swollen  Psych: + anxiety, depression, poor concentration, and insomnia    Objective:     Visit Vitals  /63 (BP 1 Location: Left arm, BP Patient Position: Sitting)   Pulse 79   Temp 97.7 °F (36.5 °C) (Temporal)   Resp 18   Ht 5' 10\" (1.778 m)   Wt 200 lb 2 oz (90.8 kg)   SpO2 98%   BMI 28.71 kg/m²         General: Alert and oriented. No acute distress. Well nourished. HEENT :  Eyes: Sclera white, conjunctiva clear. PERRLA. Extra ocular movements intact. Neck: +TTP to cervical spine and paraspinal muscles, limited ROM due to pain  Lungs: Breathing even and unlabored. All lobes clear to auscultation bilaterally   Heart :RRR, S1 and S2 normal intensity, no extra heart sounds  Extremities: Non-edematous  Neuro: Cranial nerves grossly normal.  Sensory: Sensation intact. Motor: Good  strength bilaterally  Psych: Mood is pleasant, but she tends to exaggerate her pain and requests multiple controlled substances- wants a prescription for weight loss and ADD and anxiety. Dressed appropriately but with poor hygiene. Skin: Distal portion of right index finger is erythematous, edematous, tender, and warm. There is a splinter under the skin. No drainage. There is dry rough skin to posterior aspects of upper arms. Assessment/ Plan:     Cervicalgia with radiculopathy  She was again advised to get a cervical spinal xray  May cont Flexeril 10mg TID prn pain and spasms  Advised again to try PT- she is to call and schedule this   F/U 1 month    Infection of skin of finger  Doxycycline 100mg BID x 10 days  F/U prn if symptoms worsen or do not improve. Eczema  -Triamcinolone 0.1% topical ointment- Apply to affected areas BID until rash cleared  -Use mild soap and body wash (Dove) when washing affected area.   -Pat skin dry (do not rub skin with towel) before applying topical medication.  -Avoid scratching skin or wearing irritable clothing over affected area. -May take OTC Zyrtec in AM or Benadryl at bedtime to relieve itching.  -Avoid known allergens and aggravating factors.  -Wear gloves and protective clothing when coming into contact with potential allergens.   -Contact healthcare provider for worsening symptoms or symptoms not relieved by prescribed skin regimen. Obesity  Lyrica has caused her a lot of weight gain so we are weaning her off  Decrease dose from 300 to 200mg daily- new script sent  UDS done today  Cont to avoid sweets, carbs, fried foods, and sodas   Cont exercise   Weigh self daily  F/U 1 month    Goiter  Still needs to see Endo     Anxiety  Cont Effexor daily and Remeron qHS prn insomnia  Will wean off Lyrica to prevent serotonin syndrome before we make any more med changes  F/U 1 month    Poor concentration  She does not work, states  is the [employer] she has ADD and would like to take something for it  Poor concentration can be a symptom of anxiety so she would need to see a specialist for formal testing first   Told her we would also need to get her off the Lyrica first to prevent serotonin syndrome if another medication is prescribed    Health maintenance  PAP- overdue, states she will schedule this here another day  Mammo- due, order placed  Colonoscopy- due, not addressed today  Damien Holden in 9/2020- osteopenia  Flu shot-not addressed today  Shingrix- not addressed today  Covid vaccine-not addressed today        Verbal and written instructions (see AVS) provided.  Patient expresses understanding of diagnosis and treatment plan.     Health Maintenance Due   Topic Date Due    COVID-19 Vaccine (1) Never done    DTaP/Tdap/Td series (1 - Tdap) Never done    Cervical cancer screen  Never done    Colorectal Cancer Screening Combo  Never done    Shingrix Vaccine Age 50> (1 of 2) Never done    Breast Cancer Screen Mammogram  02/22/2018    Flu Vaccine (1) 09/01/2021               Radha Means NP

## 2021-10-21 LAB
25(OH)D3 SERPL-MCNC: 11.2 NG/ML (ref 30–100)
ALBUMIN SERPL-MCNC: 4.1 G/DL (ref 3.5–5)
ALBUMIN/GLOB SERPL: 1.2 {RATIO} (ref 1.1–2.2)
ALP SERPL-CCNC: 100 U/L (ref 45–117)
ALT SERPL-CCNC: 59 U/L (ref 12–78)
ANION GAP SERPL CALC-SCNC: 4 MMOL/L (ref 5–15)
AST SERPL-CCNC: 35 U/L (ref 15–37)
BILIRUB SERPL-MCNC: 1 MG/DL (ref 0.2–1)
BUN SERPL-MCNC: 7 MG/DL (ref 6–20)
BUN/CREAT SERPL: 10 (ref 12–20)
CALCIUM SERPL-MCNC: 9.6 MG/DL (ref 8.5–10.1)
CHLORIDE SERPL-SCNC: 108 MMOL/L (ref 97–108)
CHOLEST SERPL-MCNC: 203 MG/DL
CO2 SERPL-SCNC: 27 MMOL/L (ref 21–32)
CREAT SERPL-MCNC: 0.71 MG/DL (ref 0.55–1.02)
ERYTHROCYTE [DISTWIDTH] IN BLOOD BY AUTOMATED COUNT: 15.3 % (ref 11.5–14.5)
GLOBULIN SER CALC-MCNC: 3.4 G/DL (ref 2–4)
GLUCOSE SERPL-MCNC: 98 MG/DL (ref 65–100)
HCT VFR BLD AUTO: 38.7 % (ref 35–47)
HDLC SERPL-MCNC: 42 MG/DL
HDLC SERPL: 4.8 {RATIO} (ref 0–5)
HGB BLD-MCNC: 12.3 G/DL (ref 11.5–16)
LDLC SERPL CALC-MCNC: 133 MG/DL (ref 0–100)
MCH RBC QN AUTO: 26.1 PG (ref 26–34)
MCHC RBC AUTO-ENTMCNC: 31.8 G/DL (ref 30–36.5)
MCV RBC AUTO: 82 FL (ref 80–99)
NRBC # BLD: 0 K/UL (ref 0–0.01)
NRBC BLD-RTO: 0 PER 100 WBC
PLATELET # BLD AUTO: 269 K/UL (ref 150–400)
PMV BLD AUTO: 12.3 FL (ref 8.9–12.9)
POTASSIUM SERPL-SCNC: 3.7 MMOL/L (ref 3.5–5.1)
PROT SERPL-MCNC: 7.5 G/DL (ref 6.4–8.2)
RBC # BLD AUTO: 4.72 M/UL (ref 3.8–5.2)
SODIUM SERPL-SCNC: 139 MMOL/L (ref 136–145)
TRIGL SERPL-MCNC: 140 MG/DL (ref ?–150)
VLDLC SERPL CALC-MCNC: 28 MG/DL
WBC # BLD AUTO: 5.9 K/UL (ref 3.6–11)

## 2021-10-22 DIAGNOSIS — E55.9 VITAMIN D DEFICIENCY: Primary | ICD-10-CM

## 2021-10-22 RX ORDER — ERGOCALCIFEROL 1.25 MG/1
50000 CAPSULE ORAL
Qty: 12 CAPSULE | Refills: 0 | Status: SHIPPED | OUTPATIENT
Start: 2021-10-22 | End: 2022-01-12 | Stop reason: SDUPTHER

## 2021-10-22 NOTE — PROGRESS NOTES
I called Arturo and he really doesn't want to take patient to an urgent care about her foot. Feels they are not thorough enough we did set up an appointment for next Thursday at 3:30.  (He cancelled appointment for this past Tuesday). He said patient is using Trazodone and Melatonin and still having issues with sleeping this is not new. I offered appointments with Jeannette but very difficult since Arturo only wanted an appointment between 11-1 but at this time I did not want to schedule with on call in case had to be changed, I did also offer that he call early next week and there may be cancellations and we could get her in sooner.    Vit D is very low, this could explain her fatigue. I'd like her to increase the dose of Vit D supplement to 50,000 units once a week x 3 months. Script sent to [facility]. Will recheck in 3 months. Cholesterol looks much better, keep up the good work with diet.  Blood sugar looks good, kidney and liver fn normal

## 2021-10-27 LAB — DRUGS UR: NORMAL

## 2021-10-28 NOTE — PROGRESS NOTES
Positive for thc which is legal  Now. Pregabalin did not show up. Can we please call lab and ask if this is included in the drug screen?

## 2021-12-02 ENCOUNTER — TELEPHONE (OUTPATIENT)
Dept: FAMILY MEDICINE CLINIC | Age: 56
End: 2021-12-02

## 2021-12-02 NOTE — TELEPHONE ENCOUNTER
----- Message from Sulema Prado sent at 12/2/2021 11:55 AM EST -----  Subject: Message to Provider    QUESTIONS  Information for Provider? Patient would like to know if she still should   be taking the lyrica or not please call   ---------------------------------------------------------------------------  --------------  CALL BACK INFO  What is the best way for the office to contact you? OK to leave message on   voicemail  Preferred Call Back Phone Number? [phone]  ---------------------------------------------------------------------------  --------------  SCRIPT ANSWERS  Relationship to Patient?  Self

## 2021-12-15 ENCOUNTER — OFFICE VISIT (OUTPATIENT)
Dept: FAMILY MEDICINE CLINIC | Age: 56
End: 2021-12-15
Payer: MEDICAID

## 2021-12-15 VITALS
WEIGHT: 199.13 LBS | RESPIRATION RATE: 18 BRPM | BODY MASS INDEX: 28.51 KG/M2 | DIASTOLIC BLOOD PRESSURE: 66 MMHG | SYSTOLIC BLOOD PRESSURE: 94 MMHG | HEIGHT: 70 IN | OXYGEN SATURATION: 98 % | HEART RATE: 91 BPM | TEMPERATURE: 96.5 F

## 2021-12-15 DIAGNOSIS — R41.840 POOR CONCENTRATION: ICD-10-CM

## 2021-12-15 DIAGNOSIS — M50.30 DDD (DEGENERATIVE DISC DISEASE), CERVICAL: ICD-10-CM

## 2021-12-15 DIAGNOSIS — K21.9 GASTROESOPHAGEAL REFLUX DISEASE, UNSPECIFIED WHETHER ESOPHAGITIS PRESENT: ICD-10-CM

## 2021-12-15 DIAGNOSIS — J30.89 ENVIRONMENTAL AND SEASONAL ALLERGIES: ICD-10-CM

## 2021-12-15 DIAGNOSIS — R63.5 WEIGHT GAIN: ICD-10-CM

## 2021-12-15 DIAGNOSIS — F41.9 ANXIETY: Primary | ICD-10-CM

## 2021-12-15 DIAGNOSIS — E04.9 NODULAR GOITER: ICD-10-CM

## 2021-12-15 DIAGNOSIS — M51.36 DDD (DEGENERATIVE DISC DISEASE), LUMBAR: ICD-10-CM

## 2021-12-15 PROCEDURE — 99214 OFFICE O/P EST MOD 30 MIN: CPT | Performed by: NURSE PRACTITIONER

## 2021-12-15 RX ORDER — FLUTICASONE PROPIONATE 50 MCG
SPRAY, SUSPENSION (ML) NASAL
Qty: 1 EACH | Refills: 3 | Status: SHIPPED | OUTPATIENT
Start: 2021-12-15

## 2021-12-15 NOTE — PROGRESS NOTES
1. Have you been to the ER, urgent care clinic since your last visit? Hospitalized since your last visit? No    2. Have you seen or consulted any other health care providers outside of the [address] [place] [address] since your last visit? Include any pap smears or colon screening.  No     Chief Complaint   Patient presents with    Neck Pain    Anxiety     Visit Vitals  BP 94/66 (BP 1 Location: Left arm, BP Patient Position: Sitting)   Pulse 91   Temp (!) 96.5 °F (35.8 °C) (Temporal)   Resp 18   Ht 5' 10\" (1.778 m)   Wt 199 lb 2 oz (90.3 kg)   SpO2 98%   BMI 28.57 kg/m²

## 2021-12-15 NOTE — PROGRESS NOTES
Subjective:     CC: neck pain and anxiety    Lore Oakes is a 64 y.o. female with drug seeking behavior who presents today for a 2 month follow up for chronic neck pain with radiculopathy and anxiety and other issues. For the past few months she has c/o pain in posterior neck radiates to both arms all the way to the thumbs. Reports neck muscle spasms, upper extremity paresthesia and weakness- she often drops things. She has also noticed \"puffiness\" around the base of her neck in her chest. It is tender to the touch. Pain also radiated up the back of her head causing headaches. She was alternating between tylenol 650 and advil 250 PRN. She was taking lyrica 300mg BID for hx of low back and hip pain. 2017- xray of cervical spine showed: Mild degenerative change resulting in right neural foraminal narrowing at C3-4 and C5-6.    2019- xrays of bilateral shoulder shows mild OA    She was advised to get an updated cervical spinal xray, but this still has not been done. She was also referred to PT but did not go. She was prescribed Flexeril 10mg TID prn pain and spasms. States it helped some but did not resolve the pain. Weight gain  She has been c/o that she cannot lose weight so she was weaned off Lyrica. States she has been eating cake and cookies and lacks self discipline to diet. Also drinks energy drinks regularly which I told her contains lots of sugar. She is walking twice a day for exercise but cannot lose weight. Wants to take something for weight loss but I explained to her that her BMI is only 28 so this is not warranted currently. She continues to c/o poorly controlled anxiety despite taking 150mg of Effexor. She also takes Remeron prn insomnia. She has depression and is c/o fatigue and lack of motivation to do anything. Also c/o poor concentration and would like to take something for it. Believes she has ADHD because 2 of her children and several of her grandchildren have ADHD. Goiter  She had a thyroid nodules ultrasound done in 4/2021 that showed multinodular goiter. She was referred to Endo but never scheduled an appt. She was reminded to do this at the last appt but states her children had to quarantine due to Covid exposure so could not make it. TSH has been normal.    She is also c/o GERD despite taking Omeprazole 20mg daily. Seasonal allergies  She is c/o sinus congestion and runny nose despite taking Zyrtec 10mg daily and an OTC allergy medication that she started taking on her own a few weeks ago. Patient Active Problem List   Diagnosis Code    Anxiety F41.9    Lumbar spine scoliosis M41.9    Milk allergy Z91.011    Mild intermittent asthma without complication F63.89    Environmental and seasonal allergies J30.89    Psychophysiological insomnia F51.04    Acute midline low back pain with bilateral sciatica M54.42, M54.41    Osteoarthritis of left hip M16.12    Drug-seeking behavior Z76.5    DDD (degenerative disc disease), cervical M50.30    DDD (degenerative disc disease), lumbar M51.36    Hyperlipidemia E78.5    Osteopenia of multiple sites M85.89    Gastroesophageal reflux disease K21.9    Elevated ALT measurement R74.01    Fatty liver K76.0    Frequent falls R29.6    Nodular goiter E04.9    Carotid stenosis, right I65.21       Past Medical History:   Diagnosis Date    Asthma     H/O total hip arthroplasty 06/2021    LEFT HIP, Dr. Diane Yuan Hx of migraines 1/18/2019    Psychiatric disorder     anxiety         Current Outpatient Medications:     cetirizine (ZYRTEC) 10 mg tablet, TAKE 1 TABLET BY MOUTH EVERY DAY, Disp: 90 Tablet, Rfl: 1    ergocalciferol (ERGOCALCIFEROL) 1,250 mcg (50,000 unit) capsule, Take 1 Capsule by mouth every seven (7) days. , Disp: 12 Capsule, Rfl: 0    triamcinolone acetonide (KENALOG) 0.1 % ointment, Apply  to affected area two (2) times daily as needed for Skin Irritation.  use thin layer, Disp: 80 g, Rfl: 0   venlafaxine-SR (EFFEXOR-XR) 150 mg capsule, Take 1 Capsule by mouth daily. , Disp: 90 Capsule, Rfl: 1    mirtazapine (REMERON) 15 mg tablet, TAKE 1 TABLET BY MOUTH AT BEDTIME, Disp: 90 Tablet, Rfl: 1    aspirin delayed-release 81 mg tablet, TAKE 1 TABLET BY MOUTH ONCE DAILY, Disp: 90 Tablet, Rfl: 1    cyclobenzaprine (FLEXERIL) 10 mg tablet, Take 1 Tablet by mouth two (2) times daily as needed for Muscle Spasm(s). , Disp: 60 Tablet, Rfl: 0    omeprazole (PRILOSEC) 20 mg capsule, TAKE 1 CAPSULE BY MOUTH ONCE DAILY, Disp: 90 Capsule, Rfl: 1    ascorbic acid, vitamin C, (Vitamin C) 500 mg tablet, Take 500 mg by mouth daily. , Disp: , Rfl:     mv-min/iron/folic/calcium/vitK (WOMEN'S MULTIVITAMIN PO), Take  by mouth daily. , Disp: , Rfl:     atorvastatin (LIPITOR) 20 mg tablet, Take 1 Tab by mouth daily. (Patient taking differently: Take 20 mg by mouth nightly.), Disp: 30 Tab, Rfl: 2    ondansetron (ZOFRAN ODT) 8 mg disintegrating tablet, Take 1 Tab by mouth every eight (8) hours as needed for Nausea., Disp: 20 Tab, Rfl: 0    albuterol (PROVENTIL HFA, VENTOLIN HFA, PROAIR HFA) 90 mcg/actuation inhaler, Take 2 Puffs by inhalation every four (4) hours as needed for Wheezing.  Indications: asthma attack, Disp: 1 Inhaler, Rfl: 3    Allergies   Allergen Reactions    Tramadol Itching       Past Surgical History:   Procedure Laterality Date    HX TUBAL LIGATION         Social History     Tobacco Use   Smoking Status Never Smoker   Smokeless Tobacco Never Used       Social History     Socioeconomic History    Marital status:    Tobacco Use    Smoking status: Never Smoker    Smokeless tobacco: Never Used   Substance and Sexual Activity    Alcohol use: No    Drug use: No    Sexual activity: Yes       Family History   Problem Relation Age of Onset    Cancer Mother     Diabetes Mother     Hypertension Mother     High Cholesterol Mother     Cancer Father     Diabetes Father     Hypertension Father     High Cholesterol Father     Cancer Maternal Grandmother     Diabetes Maternal Grandmother     Hypertension Maternal Grandmother     High Cholesterol Maternal Grandmother     Cancer Maternal Grandfather     Diabetes Maternal Grandfather     Hypertension Maternal Grandfather     High Cholesterol Maternal Grandfather     Hypertension Paternal Grandmother     High Cholesterol Paternal Grandmother     Diabetes Paternal Grandmother     Cancer Paternal Grandmother     High Cholesterol Paternal Grandfather     Hypertension Paternal Grandfather     Diabetes Paternal Grandfather     Cancer Paternal Grandfather        ROS:  Gen: denies fever or chills, + weight gain and fatigue  HEENT:+puffiness around the base of the neck/ upper chest  +migraine H/A, no vision changes, +sinus pressure and nasal congestion, no sore throat  Resp: denies dyspnea, cough, or wheezing  CV: denies chest pain, no pressure, or palpitations  Extremeties: denies edema, pallor, or cyanosis  GI[de-identified] +heartburn and midepigastric abdominal pain, denies nausea, vomiting, diarrhea, or constipation  : denies dysuria, hematuria, urinary frequency or urgency  Musculoskeletal: +chronic neck and bilateral shoulder pain, +chronic low back pain and left hip pain   Neuro: + numbness/tingling and weakness in the hands, denies dizziness  Endo: +hot flashes  Skin: +dry skin to upper arms  Psych: + anxiety, depression, poor concentration, and insomnia Denies SI    Objective:     Visit Vitals  BP 94/66 (BP 1 Location: Left arm, BP Patient Position: Sitting)   Pulse 91   Temp (!) 96.5 °F (35.8 °C) (Temporal)   Resp 18   Ht 5' 10\" (1.778 m)   Wt 199 lb 2 oz (90.3 kg)   SpO2 98%   BMI 28.57 kg/m²     General: Alert and oriented. No acute distress. Well nourished. HEENT :  Eyes: Sclera white, conjunctiva clear. PERRLA. Extra ocular movements intact.     Neck: +TTP to cervical spine and paraspinal muscles, limited ROM due to pain  Lungs: Breathing even and unlabored. All lobes clear to auscultation bilaterally   Heart :RRR, S1 and S2 normal intensity, no extra heart sounds  Extremities: Non-edematous  Neuro: Cranial nerves grossly normal.  Sensory: Sensation intact. Motor: Good  strength bilaterally  Psych: Mood is pleasant, but she tends to exaggerate her pain and requests multiple controlled substances- wants a prescription for weight loss and ADD and anxiety. Dressed appropriately but with poor hygiene. Skin: warm dry and intact    Assessment/ Plan:     Cervicalgia with radiculopathy  She was again advised to get a cervical spinal xray  May cont Flexeril 10mg TID prn pain and spasms  Advised again to try PT- she is to call and schedule this   Taking 500mg of tylenol and 400mg of advil  F/U 1 month    Weight gain   BMI only 28 so I do not feel a medication is necessary  Lyrica caused her a lot of weight gain so she is now off  Has been educated on a low carb and sugar free diet but states she lack will power and has been eating cakes and cookies and energy drinks- advised to cut back  Cont walking twice a day   Try to eat more in the morning and less at night, try not to eat so late at night.   Weigh self daily  F/U 1 month    Goiter  Still needs to see Endo     Anxiety  Referred to counseling services  Cont Effexor daily and Remeron qHS prn insomnia  F/U 1 month    Poor concentration  Referred to psychologist Dr Garth Gonzales for ADD testing  F/U after testing completed    Allergies  Start Flonase- script sent  Cont Zyrtec daily  D/C OTC allergy med (do not take more than 1 antihistamine)  F/U 1 month    GERD  Add Pepcid  Cont PPI  Avoid triggers  F/U 1 month    Health maintenance  PAP- overdue, states she will schedule this here another day  Mammo- due, order placed back in October, not done  Colonoscopy- due, not addressed today  Melody Lynn in 9/2020- osteopenia  Flu shot- states she never gets one  Shingrix- not addressed today  Covid vaccine- she is still considering this      Verbal and written instructions (see AVS) provided.  Patient expresses understanding of diagnosis and treatment plan. Health Maintenance Due   Topic Date Due    COVID-19 Vaccine (1) Never done    DTaP/Tdap/Td series (1 - Tdap) Never done    Cervical cancer screen  Never done    Colorectal Cancer Screening Combo  Never done    Shingrix Vaccine Age 50> (1 of 2) Never done    Breast Cancer Screen Mammogram  02/22/2018    Flu Vaccine (1) 09/01/2021               Manda Jimenez, NP

## 2021-12-16 ENCOUNTER — DOCUMENTATION ONLY (OUTPATIENT)
Dept: FAMILY MEDICINE CLINIC | Age: 56
End: 2021-12-16

## 2021-12-16 DIAGNOSIS — L20.84 INTRINSIC ECZEMA: ICD-10-CM

## 2021-12-17 RX ORDER — TRIAMCINOLONE ACETONIDE 1 MG/G
OINTMENT TOPICAL
Qty: 80 G | Refills: 0 | Status: SHIPPED | OUTPATIENT
Start: 2021-12-17 | End: 2022-05-12

## 2022-01-06 ENCOUNTER — HOSPITAL ENCOUNTER (OUTPATIENT)
Dept: GENERAL RADIOLOGY | Age: 57
Discharge: HOME | End: 2022-01-06
Payer: MEDICAID

## 2022-01-06 DIAGNOSIS — M50.30 DDD (DEGENERATIVE DISC DISEASE), CERVICAL: ICD-10-CM

## 2022-01-06 PROCEDURE — 72050 X-RAY EXAM NECK SPINE 4/5VWS: CPT

## 2022-01-08 DIAGNOSIS — E55.9 VITAMIN D DEFICIENCY: Primary | ICD-10-CM

## 2022-01-08 NOTE — PROGRESS NOTES
Xray of neck shows mild DDD (OA related to normal wear and tear of the spine) pain should improve with PT.  She has already been referred

## 2022-01-11 ENCOUNTER — LAB ONLY (OUTPATIENT)
Dept: FAMILY MEDICINE CLINIC | Age: 57
End: 2022-01-11
Payer: MEDICAID

## 2022-01-11 DIAGNOSIS — E55.9 VITAMIN D DEFICIENCY: ICD-10-CM

## 2022-01-11 PROCEDURE — 36415 COLL VENOUS BLD VENIPUNCTURE: CPT | Performed by: NURSE PRACTITIONER

## 2022-01-12 DIAGNOSIS — E55.9 VITAMIN D DEFICIENCY: ICD-10-CM

## 2022-01-12 LAB — 25(OH)D3 SERPL-MCNC: 23.3 NG/ML (ref 30–100)

## 2022-01-12 RX ORDER — ERGOCALCIFEROL 1.25 MG/1
50000 CAPSULE ORAL
Qty: 12 CAPSULE | Refills: 0 | Status: SHIPPED | OUTPATIENT
Start: 2022-01-12 | End: 2022-04-11

## 2022-01-12 NOTE — PROGRESS NOTES
Vit D level is coming up but still too low.  Cont Vit D supp once a week for 12 more weeks and recheck Vit D level in 3 months

## 2022-01-20 ENCOUNTER — TELEPHONE (OUTPATIENT)
Dept: FAMILY MEDICINE CLINIC | Age: 57
End: 2022-01-20

## 2022-01-20 ENCOUNTER — HOSPITAL ENCOUNTER (EMERGENCY)
Age: 57
Discharge: HOME | End: 2022-01-20
Attending: EMERGENCY MEDICINE
Payer: MEDICAID

## 2022-01-20 VITALS
DIASTOLIC BLOOD PRESSURE: 70 MMHG | BODY MASS INDEX: 28.49 KG/M2 | OXYGEN SATURATION: 98 % | HEIGHT: 70 IN | WEIGHT: 199 LBS | RESPIRATION RATE: 18 BRPM | SYSTOLIC BLOOD PRESSURE: 139 MMHG | HEART RATE: 120 BPM | TEMPERATURE: 99 F

## 2022-01-20 DIAGNOSIS — Z20.822 SUSPECTED COVID-19 VIRUS INFECTION: Primary | ICD-10-CM

## 2022-01-20 PROCEDURE — U0005 INFEC AGEN DETEC AMPLI PROBE: HCPCS

## 2022-01-20 PROCEDURE — 99282 EMERGENCY DEPT VISIT SF MDM: CPT

## 2022-01-20 RX ORDER — ONDANSETRON 4 MG/1
4 TABLET, ORALLY DISINTEGRATING ORAL
Qty: 12 TABLET | Refills: 0 | Status: SHIPPED | OUTPATIENT
Start: 2022-01-20

## 2022-01-20 NOTE — TELEPHONE ENCOUNTER
Patient called and states that she is having extreme body aches, high fever, and difficulty breathing. Informed patient that she needed to go straight to the ER if she was having difficulty breathing. Patient aware and states understanding.

## 2022-01-20 NOTE — Clinical Note
[address] [place] [address] EMERGENCY DEP  [address] [place] [address]  [phone]    Work/School Note    Date: 1/20/2022     To Whom It May concern:    Mateo Bonilla was evaluated by the following provider(s):  Attending Provider: Trenna Brittle, MD.   A[place] Duck virus is suspected. Per the CDC guidelines we recommend home isolation until the following conditions are all met:    1. At least five days have passed since symptoms first appeared and/or had a close exposure,   2. After home isolation for five days, wearing a mask around others for the next five days,  3. At least 24 have passed since last fever without the use of fever-reducing medications and  4. Symptoms (eg cough, shortness of breath) have improved    Please excuse from work until January 26, 2022 per CDC guidelines.   Sincerely,          Pippa Auguste MD

## 2022-01-20 NOTE — ED TRIAGE NOTES
Pt arrived by POV with family complaint of covid symptoms. Pt reports her symptoms started on Monday with not feeling well and today she felt more SOB, pt noted with a cough. Pt also reports she has court tomorrow and needs a note. Pt is awake alert and oriented x 4, pt speaking in full complete sentences. Pt reports she is \"unvaccinated\".   Pt educated on ER flow

## 2022-01-20 NOTE — Clinical Note
[address] [place] [address] EMERGENCY DEP  [address] [place] [address]  [phone]    Work/School Note    Date: 1/20/2022     To Whom It May concern:    Mateo Bonilla was evaluated by the following provider(s):  Attending Provider: Trenna Brittle, MD.   Ane Duck virus is suspected. Per the CDC guidelines we recommend home isolation until the following conditions are all met:    1. At least five days have passed since symptoms first appeared and/or had a close exposure,   2. After home isolation for five days, wearing a mask around others for the next five days,  3. At least 24 have passed since last fever without the use of fever-reducing medications and  4. Symptoms (eg cough, shortness of breath) have improved    Please excuse from work until January 26, 2022 per CDC guidelines.   Sincerely,          Pippa Auguste MD

## 2022-01-20 NOTE — ED PROVIDER NOTES
EMERGENCY DEPARTMENT HISTORY AND PHYSICAL EXAM      Date: 1/20/2022  Patient Name: Liz Drake    History of Presenting Illness     Chief Complaint   Patient presents with    Concern For [id number] (Coronavirus)       History Provided By: Patient    HPI: Liz Drake, 64 y.o. female , presents ambulatory to the ED requesting Covid testing. She reports fevers, chills, body aches. She also reports a dry cough. She also reports diarrhea and nausea and vomiting. Symptoms been going on for the past 3 to 4 days. She is not vaccinated. She thinks she got exposed from her children since they are multiple positive cases in school currently. She has been tolerating p.o. fluids. She reports generalized fatigue. No rash. No neck stiffness. PMHx: Significant for asthma  PSHx: Significant for tubal ligation  Social Hx: Non-smoker. There are no other complaints, changes, or physical findings at this time. PCP: Maxwell Clay NP    No current facility-administered medications on file prior to encounter. Current Outpatient Medications on File Prior to Encounter   Medication Sig Dispense Refill    ergocalciferol (ERGOCALCIFEROL) 1,250 mcg (50,000 unit) capsule Take 1 Capsule by mouth every seven (7) days. 12 Capsule 0    omeprazole (PRILOSEC) 20 mg capsule TAKE 1 CAPSULE BY MOUTH ONCE DAILY 90 Capsule 1    triamcinolone acetonide (KENALOG) 0.1 % ointment APPLY TO THE AFFECTED AREA(S) (THIN LAYER) TWICE DAILY AS NEEDED FOR SKIN IRRITATION 80 g 0    fluticasone propionate (FLONASE) 50 mcg/actuation nasal spray 2 sprays in each nostril once daily 1 Each 3    cetirizine (ZYRTEC) 10 mg tablet TAKE 1 TABLET BY MOUTH EVERY DAY 90 Tablet 1    venlafaxine-SR (EFFEXOR-XR) 150 mg capsule Take 1 Capsule by mouth daily.  90 Capsule 1    mirtazapine (REMERON) 15 mg tablet TAKE 1 TABLET BY MOUTH AT BEDTIME 90 Tablet 1    aspirin delayed-release 81 mg tablet TAKE 1 TABLET BY MOUTH ONCE DAILY 90 Tablet 1    cyclobenzaprine (FLEXERIL) 10 mg tablet Take 1 Tablet by mouth two (2) times daily as needed for Muscle Spasm(s). (Patient not taking: Reported on 12/15/2021) 60 Tablet 0    ascorbic acid, vitamin C, (Vitamin C) 500 mg tablet Take 500 mg by mouth daily.  mv-min/iron/folic/calcium/vitK (WOMEN'S MULTIVITAMIN PO) Take  by mouth daily.  atorvastatin (LIPITOR) 20 mg tablet Take 1 Tab by mouth daily. (Patient taking differently: Take 20 mg by mouth nightly.) 30 Tab 2    ondansetron (ZOFRAN ODT) 8 mg disintegrating tablet Take 1 Tab by mouth every eight (8) hours as needed for Nausea. 20 Tab 0    albuterol (PROVENTIL HFA, VENTOLIN HFA, PROAIR HFA) 90 mcg/actuation inhaler Take 2 Puffs by inhalation every four (4) hours as needed for Wheezing.  Indications: asthma attack 1 Inhaler 3       Past History     Past Medical History:  Past Medical History:   Diagnosis Date    Asthma     H/O total hip arthroplasty 06/2021    LEFT HIP, Dr. Leesa Maguire Hx of migraines 1/18/2019    Psychiatric disorder     anxiety       Past Surgical History:  Past Surgical History:   Procedure Laterality Date    HX TUBAL LIGATION         Family History:  Family History   Problem Relation Age of Onset    Cancer Mother     Diabetes Mother     Hypertension Mother     High Cholesterol Mother     Cancer Father     Diabetes Father     Hypertension Father     High Cholesterol Father     Cancer Maternal Grandmother     Diabetes Maternal Grandmother     Hypertension Maternal Grandmother     High Cholesterol Maternal Grandmother     Cancer Maternal Grandfather     Diabetes Maternal Grandfather     Hypertension Maternal Grandfather     High Cholesterol Maternal Grandfather     Hypertension Paternal Grandmother     High Cholesterol Paternal Grandmother     Diabetes Paternal Grandmother     Cancer Paternal Grandmother     High Cholesterol Paternal Grandfather     Hypertension Paternal Grandfather     Diabetes Paternal Grandfather     Cancer Paternal Grandfather        Social History:  Social History     Tobacco Use    Smoking status: Never Smoker    Smokeless tobacco: Never Used   Substance Use Topics    Alcohol use: No    Drug use: No       Allergies: Allergies   Allergen Reactions    Tramadol Itching         Review of Systems     Constitutional: Positive for chills and fever. Positive for fatigue  HEENT: Positive for congestion.  - for sore throat. Eyes: Negative for pain or redness. Respiratory: Positive for cough. Negative for chest tightness and shortness of breath. Cardiovascular: Negative for chest pain and palpitations. Gastrointestinal: Negative for abdominal pain;+ diarrhea, nausea and vomiting. Genitourinary: Negative for dysuria, frequency and urgency. Musculoskeletal: Positive for myalgias. Negative for back pain and neck pain. Skin: Negative for rash. Neurological: Negative for syncope, lightheadedness, seizure. Psychiatric/behavioral: Negative for confusion. All other systems reviewed and are negative. Physical Exam     Constitutional: No acute distress. Nontoxic-appearing. Well developed. Not diaphoretic. Head: Normocephalic and atraumatic. Eyes. No scleral icterus. Conjunctiva: Conjunctive are normal.  Pupils: Pupils are equal round reactive to light. Pulmonary: Pulmonary effort is normal.  Breathing comfortably. Musculoskeletal: Ambulates without difficulty. Skin: Skin is warm and dry. No evidence of rash. Neurological: Mental status: Alert and oriented. Age appropriate. Psychiatric: Behavior normal.    Diagnostic Study Results     Labs -   No results found for this or any previous visit (from the past 12 hour(s)). Radiologic Studies -   No orders to display     CT Results  (Last 48 hours)    None        CXR Results  (Last 48 hours)    None            Medical Decision Making   I am the first provider for this patient.     I reviewed the vital signs, available nursing notes, past medical history, past surgical history, family history and social history. Vital Signs-Reviewed the patient's vital signs. Patient Vitals for the past 12 hrs:   Temp Pulse Resp BP SpO2   01/20/22 1101 99 °F (37.2 °C) (!) 120 18 139/70 98 %           Records Reviewed: Nursing notes reviewed        ED Course:   Initial assessment performed. The patients presenting problems have been discussed, and they are in agreement with the care plan formulated and outlined with them. I have encouraged them to ask questions as they arise throughout their visit. PROGRESS NOTE  Patient here with concerns for Covid infection. Swab obtained. Written by Mihaela Andrea MD     Progress note: Will follow up as instructed. All questions have been answered, pt voiced understanding and agreement with plan. Specific return precautions provided as well as instructions to return to the ED should sx worsen at any time. Vital signs stable for discharge. I have also put together some discharge instructions for them that include: 1) educational information regarding their diagnosis, 2) how to care for their diagnosis at home, as well a 3) list of reasons why they would want to return to the ED prior to their follow-up appointment, should their condition change. Written by Mihaela Andrea MD        Critical Care Time:   0    Disposition:  Discharge    PLAN:  1. Current Discharge Medication List        2. Follow-up Information    None       Return to ED if worse     Diagnosis     Clinical Impression:   1. Suspected COVID-19 virus infection              Please note that this dictation was completed with [employer], the computer voice recognition software. Quite often unanticipated grammatical, syntax, homophones, and other interpretive errors are inadvertently transcribed by the computer software. Please disregard these errors. Please excuse any errors that have escaped final proofreading.

## 2022-01-21 LAB
SARS-COV-2, XPLCVT: DETECTED
SOURCE, COVRS: ABNORMAL

## 2022-01-22 NOTE — PROGRESS NOTES
Call placed to patient regarding positive COVID test result. Neither number is in working order.   Beckie Morgan MD

## 2022-03-18 PROBLEM — K21.9 GASTROESOPHAGEAL REFLUX DISEASE: Status: ACTIVE | Noted: 2020-09-16

## 2022-03-19 PROBLEM — J45.20 MILD INTERMITTENT ASTHMA WITHOUT COMPLICATION: Status: ACTIVE | Noted: 2019-04-17

## 2022-03-19 PROBLEM — M51.36 DDD (DEGENERATIVE DISC DISEASE), LUMBAR: Status: ACTIVE | Noted: 2020-07-28

## 2022-03-19 PROBLEM — I65.21 CAROTID STENOSIS, RIGHT: Status: ACTIVE | Noted: 2021-04-11

## 2022-03-19 PROBLEM — F51.04 PSYCHOPHYSIOLOGICAL INSOMNIA: Status: ACTIVE | Noted: 2019-04-17

## 2022-03-19 PROBLEM — M51.369 DDD (DEGENERATIVE DISC DISEASE), LUMBAR: Status: ACTIVE | Noted: 2020-07-28

## 2022-03-19 PROBLEM — R29.6 FREQUENT FALLS: Status: ACTIVE | Noted: 2021-03-15

## 2022-03-19 PROBLEM — M54.42 ACUTE MIDLINE LOW BACK PAIN WITH BILATERAL SCIATICA: Status: ACTIVE | Noted: 2019-04-17

## 2022-03-19 PROBLEM — E78.5 HYPERLIPIDEMIA: Status: ACTIVE | Noted: 2020-07-28

## 2022-03-19 PROBLEM — M41.9 LUMBAR SPINE SCOLIOSIS: Status: ACTIVE | Noted: 2019-04-01

## 2022-03-19 PROBLEM — F41.9 ANXIETY: Status: ACTIVE | Noted: 2019-01-18

## 2022-03-19 PROBLEM — M54.41 ACUTE MIDLINE LOW BACK PAIN WITH BILATERAL SCIATICA: Status: ACTIVE | Noted: 2019-04-17

## 2022-03-19 PROBLEM — Z76.5 DRUG-SEEKING BEHAVIOR: Status: ACTIVE | Noted: 2020-07-28

## 2022-03-19 PROBLEM — J30.89 ENVIRONMENTAL AND SEASONAL ALLERGIES: Status: ACTIVE | Noted: 2019-04-17

## 2022-03-19 PROBLEM — Z91.011 MILK ALLERGY: Status: ACTIVE | Noted: 2019-04-17

## 2022-03-19 PROBLEM — M16.12 OSTEOARTHRITIS OF LEFT HIP: Status: ACTIVE | Noted: 2019-12-10

## 2022-03-19 PROBLEM — M85.89 OSTEOPENIA OF MULTIPLE SITES: Status: ACTIVE | Noted: 2020-09-06

## 2022-03-20 PROBLEM — K76.0 FATTY LIVER: Status: ACTIVE | Noted: 2020-10-08

## 2022-03-20 PROBLEM — E04.9 NODULAR GOITER: Status: ACTIVE | Noted: 2021-04-11

## 2022-03-20 PROBLEM — R74.01 ELEVATED ALT MEASUREMENT: Status: ACTIVE | Noted: 2020-09-17

## 2022-03-20 PROBLEM — M50.30 DDD (DEGENERATIVE DISC DISEASE), CERVICAL: Status: ACTIVE | Noted: 2020-07-28

## 2022-04-08 DIAGNOSIS — E55.9 VITAMIN D DEFICIENCY: ICD-10-CM

## 2022-04-11 RX ORDER — ERGOCALCIFEROL 1.25 MG/1
CAPSULE ORAL
Qty: 12 CAPSULE | Refills: 0 | Status: SHIPPED | OUTPATIENT
Start: 2022-04-11 | End: 2022-07-09

## 2022-04-28 DIAGNOSIS — J30.89 ENVIRONMENTAL AND SEASONAL ALLERGIES: ICD-10-CM

## 2022-04-29 RX ORDER — CETIRIZINE HCL 10 MG
TABLET ORAL
Qty: 90 TABLET | Refills: 1 | Status: SHIPPED | OUTPATIENT
Start: 2022-04-29 | End: 2022-10-26

## 2022-05-11 DIAGNOSIS — L20.84 INTRINSIC ECZEMA: ICD-10-CM

## 2022-05-12 RX ORDER — TRIAMCINOLONE ACETONIDE 1 MG/G
OINTMENT TOPICAL
Qty: 80 G | Refills: 0 | Status: SHIPPED | OUTPATIENT
Start: 2022-05-12 | End: 2022-06-13 | Stop reason: SDUPTHER

## 2022-06-10 ENCOUNTER — NURSE TRIAGE (OUTPATIENT)
Dept: OTHER | Facility: CLINIC | Age: 57
End: 2022-06-10

## 2022-06-10 NOTE — TELEPHONE ENCOUNTER
Received call from [facility] at [facility] with Red Flag Complaint. Subjective: Caller states \"I blacked out and fell a couple days ago. I am having neck and back pain. Last week I was feeling lightheaded. Last Thursday I had a dizzy spell and I found myself on the floor. My daughter said I hit the wall and landed on my back. It hurts when I walk, I can't barely lift things up. I am unable to put a lot of pressure on my leg/foot. \"     Current Symptoms: neck, right shoulder, right wrist (can't hold objects & swollen), lower back severe pain     Onset: 8 days ago; fall 6/2    Pain Severity: 10/10; neck, right shoulder, right wrist, lower back     Temperature: None     What has been tried: increased iron in diet, ice, tylenol, epson salt soak, warm showers       Recommended disposition: Go to ED/[facility] Now (Or to Office with PCP Approval) - connected with mary in the office to further assist.     Care advice provided, patient verbalizes understanding; denies any other questions or concerns; instructed to call back for any new or worsening symptoms. Connected with office. Attention Provider: Thank you for allowing me to participate in the care of your patient. The patient was connected to triage in response to information provided to the [facility]. Please do not respond through this encounter as the response is not directed to a shared pool.     Reason for Disposition   Weakness of a leg or foot (e.g., unable to bear weight, dragging foot)    Protocols used: BACK PAIN-ADULT-[place]

## 2022-06-12 NOTE — PROGRESS NOTES
Tana Wilburn is a 62 y.o. female who presents today with c/o   Chief Complaint   Patient presents with    Fall     Assessment/ Plan:     Dizziness  Check labs  F/U: If symptoms do not improve    Vitamin D deficiency  Check labs  Cont meds    Hyperlipidemia  Check labs  Cont meds    Eczema  Cont triamcinolone-refill provided    Degenerative disc disease  Cont to stretch at home  Has tried PT  Has been to specialist  F/U: 1 month    Insomnia  Stop taking mirtazapine  Start taking amitriptyline    Right shoulder pain  Suspect impingement syndrome  Stretch the shoulder daily  Take OTC ibuprofen for pain  Will consider PT/imaging if pain persists    Right wrist pain  Suspect strain  Will consider imaging if pain persist       Orders Placed This Encounter    VITAMIN D, 25 HYDROXY     Standing Status:   Future     Number of Occurrences:   1     Standing Expiration Date:   6/13/2023    LIPID PANEL     Standing Status:   Future     Number of Occurrences:   1     Standing Expiration Date:   6/13/2023    CBC WITH AUTOMATED DIFF     Standing Status:   Future     Number of Occurrences:   1     Standing Expiration Date:   4/10/5465    METABOLIC PANEL, COMPREHENSIVE     Standing Status:   Future     Number of Occurrences:   1     Standing Expiration Date:   6/13/2023    triamcinolone acetonide (KENALOG) 0.1 % ointment     Sig: APPLY TO THE AFFECTED AREA(S) (THIN LAYER) TWICE DAILY AS NEEDED FOR SKIN IRRITATION     Dispense:  80 g     Refill:  0    amitriptyline (ELAVIL) 25 mg tablet     Sig: Take 1 Tablet by mouth nightly. Dispense:  90 Tablet     Refill:  1    cyclobenzaprine (FLEXERIL) 10 mg tablet     Sig: Take 1 Tablet by mouth two (2) times daily as needed for Muscle Spasm(s). Dispense:  60 Tablet     Refill:  0       Follow-up and Dispositions    · Return in about 1 month (around 7/13/2022). Subjective:  Tana Wilburn is a 62 y.o. female with complaints of fall.  This is my first time seeing this patient. States she fell over one week ago in her kitchen. States her daughter saw her fall. [place] dizzy before she fell. Denies hitting her head. She also states 'I have a history of dizziness.' Thinks this is why she fell the other night but she cannot remember. Requesting to have her labs checked today. Today, she reports right wrist and right shoulder pain after the fall. Cant remember if she fell on her wrist or shoulder. There is no swelling to the right wrist. She has a negative drop arm test today. There is no paresthesia in the right hand. She has limited ROM to the right shoulder, but pain is not getting worse. She has full ROM to the right wrist.   She also has tenderness to her lumbar spine. On chart review-> She has chronic left hip pain and low back pain. Saw orthopedist Dr Maki Ramirez who gave her an injection. She says this helped some. She was told she is not a candidate for surgery due to poor dentition. She has known DDD. Finally saw spine specialist Dr Valentin Mars for chronic for low back pain and he determined that her pain is coming from the hips. MRI 4/2019 showed multilevel degenerative disease. PT did not help. Denies bowel/bladder incontinence. Would like to try flexeril again since I told her we cannot give her narcotics for her pain. H/O Vitamin D deficiency. Taking vitamin D. Will check labs today. H/O Hyperlipidemia. Taking atorvastatin. Will check labs. H/O Eczema. Using triamcinolone-requesting a refill. Insomnia  States mirtazapine did not work for her. Wants to take Elbert Light. States this worked well for her in the past. Advised her I will start amitriptyline but Elbert Light is not recommended for use every night.         Patient Active Problem List   Diagnosis Code    Anxiety F41.9    Lumbar spine scoliosis M41.9    Milk allergy Z91.011    Mild intermittent asthma without complication X26.99    Environmental and seasonal allergies J30.89    Psychophysiological insomnia F51.04    Acute midline low back pain with bilateral sciatica M54.42, M54.41    Osteoarthritis of left hip M16.12    Drug-seeking behavior Z76.5    DDD (degenerative disc disease), cervical M50.30    DDD (degenerative disc disease), lumbar M51.36    Hyperlipidemia E78.5    Osteopenia of multiple sites M85.89    Gastroesophageal reflux disease K21.9    Elevated ALT measurement R74.01    Fatty liver K76.0    Frequent falls R29.6    Nodular goiter E04.9    Carotid stenosis, right I65.21       Past Medical History:   Diagnosis Date    Asthma     H/O total hip arthroplasty 06/2021    LEFT HIP, Dr. Richardson Reynolds Hx of migraines 1/18/2019    Psychiatric disorder     anxiety         Current Outpatient Medications:     triamcinolone acetonide (KENALOG) 0.1 % ointment, APPLY TO THE AFFECTED AREA(S) (THIN LAYER) TWICE DAILY AS NEEDED FOR SKIN IRRITATION, Disp: 80 g, Rfl: 0    amitriptyline (ELAVIL) 25 mg tablet, Take 1 Tablet by mouth nightly., Disp: 90 Tablet, Rfl: 1    cyclobenzaprine (FLEXERIL) 10 mg tablet, Take 1 Tablet by mouth two (2) times daily as needed for Muscle Spasm(s). , Disp: 60 Tablet, Rfl: 0    cetirizine (ZYRTEC) 10 mg tablet, TAKE 1 TABLET BY MOUTH ONCE DAILY, Disp: 90 Tablet, Rfl: 1    Vitamin D2 1,250 mcg (50,000 unit) capsule, TAKE 1 CAPSULE BY MOUTH EVERY SEVEN DAYS, Disp: 12 Capsule, Rfl: 0    venlafaxine-SR (EFFEXOR-XR) 150 mg capsule, TAKE 1 CAPSULE BY MOUTH EVERY DAY, Disp: 90 Capsule, Rfl: 1    aspirin delayed-release 81 mg tablet, TAKE 1 TABLET BY MOUTH ONCE DAILY, Disp: 90 Tablet, Rfl: 1    ondansetron (ZOFRAN ODT) 4 mg disintegrating tablet, Take 1 Tablet by mouth every eight (8) hours as needed for Nausea or Vomiting., Disp: 12 Tablet, Rfl: 0    omeprazole (PRILOSEC) 20 mg capsule, TAKE 1 CAPSULE BY MOUTH ONCE DAILY, Disp: 90 Capsule, Rfl: 1    fluticasone propionate (FLONASE) 50 mcg/actuation nasal spray, 2 sprays in each nostril once daily, Disp: 1 Each, Rfl: 3   ascorbic acid, vitamin C, (Vitamin C) 500 mg tablet, Take 500 mg by mouth daily. , Disp: , Rfl:     mv-min/iron/folic/calcium/vitK (WOMEN'S MULTIVITAMIN PO), Take  by mouth daily. , Disp: , Rfl:     atorvastatin (LIPITOR) 20 mg tablet, Take 1 Tab by mouth daily. (Patient taking differently: Take 20 mg by mouth nightly.), Disp: 30 Tab, Rfl: 2    albuterol (PROVENTIL HFA, VENTOLIN HFA, PROAIR HFA) 90 mcg/actuation inhaler, Take 2 Puffs by inhalation every four (4) hours as needed for Wheezing.  Indications: asthma attack, Disp: 1 Inhaler, Rfl: 3    Allergies   Allergen Reactions    Tramadol Itching       Past Surgical History:   Procedure Laterality Date    HX TUBAL LIGATION         Social History     Tobacco Use   Smoking Status Never Smoker   Smokeless Tobacco Never Used       Social History     Socioeconomic History    Marital status:    Tobacco Use    Smoking status: Never Smoker    Smokeless tobacco: Never Used   Vaping Use    Vaping Use: Never used   Substance and Sexual Activity    Alcohol use: No    Drug use: No    Sexual activity: Yes       Family History   Problem Relation Age of Onset    Cancer Mother     Diabetes Mother     Hypertension Mother     High Cholesterol Mother     Cancer Father     Diabetes Father     Hypertension Father     High Cholesterol Father     Cancer Maternal Grandmother     Diabetes Maternal Grandmother     Hypertension Maternal Grandmother     High Cholesterol Maternal Grandmother     Cancer Maternal Grandfather     Diabetes Maternal Grandfather     Hypertension Maternal Grandfather     High Cholesterol Maternal Grandfather     Hypertension Paternal Grandmother     High Cholesterol Paternal Grandmother     Diabetes Paternal Grandmother     Cancer Paternal Grandmother     High Cholesterol Paternal Grandfather     Hypertension Paternal Grandfather     Diabetes Paternal Grandfather     Cancer Paternal Grandfather        ROS:  Review of Systems Constitutional: Negative for chills and fever. HENT: Negative for hearing loss and tinnitus. Eyes: Negative for blurred vision and double vision. Cardiovascular: Negative for chest pain and palpitations. Gastrointestinal: Negative for abdominal pain. Musculoskeletal: Positive for myalgias. Right shoulder pain  Right wrist pain   Skin: Positive for itching (left arm). Negative for rash. Neurological: Positive for dizziness. Psychiatric/Behavioral: The patient has insomnia. Objective:     Visit Vitals  /87 (BP 1 Location: Left arm)   Pulse 86   Temp 98 °F (36.7 °C) (Oral)   Resp 16   Ht 5' 10\" (1.778 m)   Wt 189 lb 9.6 oz (86 kg)   SpO2 97%   BMI 27.20 kg/m²     Body mass index is 27.2 kg/m². Physical Exam  Vitals reviewed. Constitutional:       General: She is not in acute distress. Appearance: She is not ill-appearing or toxic-appearing. HENT:      Head: Normocephalic. Right Ear: External ear normal.      Left Ear: External ear normal.      Nose: Nose normal.   Eyes:      Extraocular Movements: Extraocular movements intact. Conjunctiva/sclera: Conjunctivae normal.      Pupils: Pupils are equal, round, and reactive to light. Cardiovascular:      Rate and Rhythm: Normal rate. Pulses: Normal pulses. Pulmonary:      Effort: Pulmonary effort is normal.   Abdominal:      General: Abdomen is flat. Musculoskeletal:      Right shoulder: Decreased range of motion. Right wrist: Normal. No swelling or deformity. Left wrist: Normal.      Lumbar back: Tenderness present. No deformity. Comments: Pain with abduction of shoulder >90 degrees   Skin:     General: Skin is warm and dry. Neurological:      Mental Status: She is alert and oriented to person, place, and time. Psychiatric:         Attention and Perception: She is inattentive.          Mood and Affect: Mood normal.         Behavior: Behavior normal.               No results found for this visit on 06/13/22. Verbal and written instructions (see AVS) provided. Patient expresses understanding of diagnosis and treatment plan. Follow-up and Dispositions    · Return in about 1 month (around 7/13/2022). Patient has been advised to contact practice or seek care if condition persists or worsens.      Smitha Keith, NP

## 2022-06-13 ENCOUNTER — OFFICE VISIT (OUTPATIENT)
Dept: FAMILY MEDICINE CLINIC | Age: 57
End: 2022-06-13
Payer: MEDICAID

## 2022-06-13 VITALS
SYSTOLIC BLOOD PRESSURE: 124 MMHG | BODY MASS INDEX: 27.14 KG/M2 | HEART RATE: 86 BPM | HEIGHT: 70 IN | DIASTOLIC BLOOD PRESSURE: 87 MMHG | TEMPERATURE: 98 F | WEIGHT: 189.6 LBS | RESPIRATION RATE: 16 BRPM | OXYGEN SATURATION: 97 %

## 2022-06-13 DIAGNOSIS — M25.511 ACUTE PAIN OF RIGHT SHOULDER: ICD-10-CM

## 2022-06-13 DIAGNOSIS — E78.5 HYPERLIPIDEMIA, UNSPECIFIED HYPERLIPIDEMIA TYPE: ICD-10-CM

## 2022-06-13 DIAGNOSIS — L20.84 INTRINSIC ECZEMA: ICD-10-CM

## 2022-06-13 DIAGNOSIS — M50.30 DDD (DEGENERATIVE DISC DISEASE), CERVICAL: ICD-10-CM

## 2022-06-13 DIAGNOSIS — E55.9 VITAMIN D DEFICIENCY: ICD-10-CM

## 2022-06-13 DIAGNOSIS — R42 DIZZINESS: Primary | ICD-10-CM

## 2022-06-13 DIAGNOSIS — M25.531 RIGHT WRIST PAIN: ICD-10-CM

## 2022-06-13 PROCEDURE — 99214 OFFICE O/P EST MOD 30 MIN: CPT | Performed by: NURSE PRACTITIONER

## 2022-06-13 RX ORDER — AMITRIPTYLINE HYDROCHLORIDE 25 MG/1
25 TABLET, FILM COATED ORAL
Qty: 90 TABLET | Refills: 1 | Status: SHIPPED | OUTPATIENT
Start: 2022-06-13

## 2022-06-13 RX ORDER — TRIAMCINOLONE ACETONIDE 1 MG/G
OINTMENT TOPICAL
Qty: 80 G | Refills: 0 | Status: SHIPPED | OUTPATIENT
Start: 2022-06-13 | End: 2022-08-20

## 2022-06-13 RX ORDER — CYCLOBENZAPRINE HCL 10 MG
10 TABLET ORAL
Qty: 60 TABLET | Refills: 0 | Status: SHIPPED | OUTPATIENT
Start: 2022-06-13 | End: 2022-10-12 | Stop reason: SDUPTHER

## 2022-06-13 NOTE — PROGRESS NOTES
Chief Complaint   Patient presents with    Fall       1. \"Have you been to the ER, urgent care clinic since your last visit? Hospitalized since your last visit? \" No    2. \"Have you seen or consulted any other health care providers outside of the [address] [place] [address] since your last visit? \" No     3. For patients aged 39-70: Has the patient had a colonoscopy / FIT/ Cologuard? No      If the patient is female:    4. For patients aged 41-77: Has the patient had a mammogram within the past 2 years? No      5. For patients aged 21-65: Has the patient had a pap smear? No      Identified pt with two pt identifiers(name and ). Reviewed record in preparation for visit and have obtained necessary documentation.     Symptom review: Covid Review    NO  Fever   NO  Shaking chills  NO  Cough  NO Headaches  NO  Body aches  NO  Coughing up blood  NO  Chest congestion  NO  Chest pain  NO  Shortness of breath  NO  Profound Loss of smell/taste  NO  Nausea/Vomiting   NO  Loose stool/Diarrhea  NO  any skin issues

## 2022-06-14 LAB
25(OH)D3 SERPL-MCNC: 15.7 NG/ML (ref 30–100)
ALBUMIN SERPL-MCNC: 4.3 G/DL (ref 3.5–5)
ALBUMIN/GLOB SERPL: 1.2 {RATIO} (ref 1.1–2.2)
ALP SERPL-CCNC: 99 U/L (ref 45–117)
ALT SERPL-CCNC: 25 U/L (ref 12–78)
ANION GAP SERPL CALC-SCNC: 2 MMOL/L (ref 5–15)
AST SERPL-CCNC: 12 U/L (ref 15–37)
BASOPHILS # BLD: 0.1 K/UL (ref 0–0.1)
BASOPHILS NFR BLD: 1 % (ref 0–1)
BILIRUB SERPL-MCNC: 0.7 MG/DL (ref 0.2–1)
BUN SERPL-MCNC: 12 MG/DL (ref 6–20)
BUN/CREAT SERPL: 13 (ref 12–20)
CALCIUM SERPL-MCNC: 9.4 MG/DL (ref 8.5–10.1)
CHLORIDE SERPL-SCNC: 106 MMOL/L (ref 97–108)
CHOLEST SERPL-MCNC: 238 MG/DL
CO2 SERPL-SCNC: 29 MMOL/L (ref 21–32)
CREAT SERPL-MCNC: 0.92 MG/DL (ref 0.55–1.02)
DIFFERENTIAL METHOD BLD: NORMAL
EOSINOPHIL # BLD: 0.3 K/UL (ref 0–0.4)
EOSINOPHIL NFR BLD: 4 % (ref 0–7)
ERYTHROCYTE [DISTWIDTH] IN BLOOD BY AUTOMATED COUNT: 13.2 % (ref 11.5–14.5)
GLOBULIN SER CALC-MCNC: 3.5 G/DL (ref 2–4)
GLUCOSE SERPL-MCNC: 95 MG/DL (ref 65–100)
HCT VFR BLD AUTO: 42.3 % (ref 35–47)
HDLC SERPL-MCNC: 40 MG/DL
HDLC SERPL: 6 {RATIO} (ref 0–5)
HGB BLD-MCNC: 13.5 G/DL (ref 11.5–16)
IMM GRANULOCYTES # BLD AUTO: 0 K/UL (ref 0–0.04)
IMM GRANULOCYTES NFR BLD AUTO: 0 % (ref 0–0.5)
LDLC SERPL CALC-MCNC: 157 MG/DL (ref 0–100)
LYMPHOCYTES # BLD: 2 K/UL (ref 0.8–3.5)
LYMPHOCYTES NFR BLD: 29 % (ref 12–49)
MCH RBC QN AUTO: 28 PG (ref 26–34)
MCHC RBC AUTO-ENTMCNC: 31.9 G/DL (ref 30–36.5)
MCV RBC AUTO: 87.6 FL (ref 80–99)
MONOCYTES # BLD: 0.4 K/UL (ref 0–1)
MONOCYTES NFR BLD: 5 % (ref 5–13)
NEUTS SEG # BLD: 4.1 K/UL (ref 1.8–8)
NEUTS SEG NFR BLD: 61 % (ref 32–75)
NRBC # BLD: 0 K/UL (ref 0–0.01)
NRBC BLD-RTO: 0 PER 100 WBC
PLATELET # BLD AUTO: 287 K/UL (ref 150–400)
PMV BLD AUTO: 11.4 FL (ref 8.9–12.9)
POTASSIUM SERPL-SCNC: 4 MMOL/L (ref 3.5–5.1)
PROT SERPL-MCNC: 7.8 G/DL (ref 6.4–8.2)
RBC # BLD AUTO: 4.83 M/UL (ref 3.8–5.2)
SODIUM SERPL-SCNC: 137 MMOL/L (ref 136–145)
TRIGL SERPL-MCNC: 205 MG/DL (ref ?–150)
VLDLC SERPL CALC-MCNC: 41 MG/DL
WBC # BLD AUTO: 6.8 K/UL (ref 3.6–11)

## 2022-06-15 NOTE — PROGRESS NOTES
Kidneys and liver are good. Cholesterol is elevated--she needs to work on diet and continue her cholesterol medication. Vitamin D is still really low. She should still be taking the supplement that [facility] prescribed.

## 2022-07-08 DIAGNOSIS — E55.9 VITAMIN D DEFICIENCY: ICD-10-CM

## 2022-07-09 RX ORDER — ERGOCALCIFEROL 1.25 MG/1
CAPSULE ORAL
Qty: 12 CAPSULE | Refills: 0 | Status: SHIPPED | OUTPATIENT
Start: 2022-07-09 | End: 2022-10-06

## 2022-07-13 ENCOUNTER — OFFICE VISIT (OUTPATIENT)
Dept: FAMILY MEDICINE CLINIC | Age: 57
End: 2022-07-13
Payer: MEDICAID

## 2022-07-13 VITALS
RESPIRATION RATE: 20 BRPM | BODY MASS INDEX: 26.54 KG/M2 | SYSTOLIC BLOOD PRESSURE: 106 MMHG | WEIGHT: 185.4 LBS | DIASTOLIC BLOOD PRESSURE: 74 MMHG | HEIGHT: 70 IN | TEMPERATURE: 98.1 F | OXYGEN SATURATION: 97 % | HEART RATE: 92 BPM

## 2022-07-13 DIAGNOSIS — F51.04 PSYCHOPHYSIOLOGICAL INSOMNIA: ICD-10-CM

## 2022-07-13 DIAGNOSIS — M51.36 DDD (DEGENERATIVE DISC DISEASE), LUMBAR: ICD-10-CM

## 2022-07-13 DIAGNOSIS — M50.30 DDD (DEGENERATIVE DISC DISEASE), CERVICAL: Primary | ICD-10-CM

## 2022-07-13 DIAGNOSIS — G56.01 CARPAL TUNNEL SYNDROME ON RIGHT: ICD-10-CM

## 2022-07-13 PROCEDURE — 99214 OFFICE O/P EST MOD 30 MIN: CPT | Performed by: NURSE PRACTITIONER

## 2022-07-13 RX ORDER — MELOXICAM 15 MG/1
15 TABLET ORAL DAILY
Qty: 90 TABLET | Refills: 0 | Status: SHIPPED | OUTPATIENT
Start: 2022-07-13 | End: 2022-10-06

## 2022-07-13 NOTE — PROGRESS NOTES
Subjective:     CC: insomnia, chronic pain    Macrina Palmer is a 62 y.o. female who presents today for a 1 month follow up for insomnia and chronic pain. She saw NP Jadyn Palmer a month ago. I have not seen her in 6 months. Insomnia   At the last OV 1 month ago she was not sleeping well so her Mirtazepine was switched to Amitriptyline 25mg qHS and this has worked well. She has had chronic dizziness and lightheadedness with falls for over a year now. She believes it is related to either her anxiety or her pain. I had referred her to cardiology in 3-2021 when she first reported them but she never made an appt. CT of the head was normal other than sinus disease. A carotid ultrasound was done- showed only mild stenosis in the right side, left side was normal. She does have chronic neck pain with radiculopathy. Her arms and hands will go numb and she will drop things. She has pain in the right wrist. Her cervical spinal xray in 1-2022 only showed mild DDD. She also has chronic middle lumbar back pain= states it feels like \"hot needles stabbing her. \" Pain is intermittent and radiates down the buttocks and shoots up the thoracic spine. She is on flexeril 10mg BID but it does not adequately relieve the pain. She used to take Lyrica but stopped it due to weight gain. She had a lumbar MRI in 2019 that showed multi-level mild bulging discs without narrowing or impingement of the spinal canal. She saw  spine specialist Dr Hannah Canales who felt her pain was coming from the left hip which was later replaced.       Patient Active Problem List   Diagnosis Code    Anxiety F41.9    Lumbar spine scoliosis M41.9    Milk allergy Z91.011    Mild intermittent asthma without complication P60.25    Environmental and seasonal allergies J30.89    Psychophysiological insomnia F51.04    Acute midline low back pain with bilateral sciatica M54.42, M54.41    Osteoarthritis of left hip M16.12    Drug-seeking behavior Z76.5    DDD (degenerative disc disease), cervical M50.30    DDD (degenerative disc disease), lumbar M51.36    Hyperlipidemia E78.5    Osteopenia of multiple sites M85.89    Gastroesophageal reflux disease K21.9    Elevated ALT measurement R74.01    Fatty liver K76.0    Frequent falls R29.6    Nodular goiter E04.9    Carotid stenosis, right I65.21       Past Medical History:   Diagnosis Date    Asthma     H/O total hip arthroplasty 06/2021    LEFT HIP, Dr. Sanches Gamma Hx of migraines 1/18/2019    Psychiatric disorder     anxiety         Current Outpatient Medications:     Vitamin D2 1,250 mcg (50,000 unit) capsule, TAKE 1 CAPSULE BY MOUTH EVERY SEVEN DAYS, Disp: 12 Capsule, Rfl: 0    omeprazole (PRILOSEC) 20 mg capsule, TAKE 1 CAPSULE BY MOUTH ONCE DAILY, Disp: 90 Capsule, Rfl: 1    triamcinolone acetonide (KENALOG) 0.1 % ointment, APPLY TO THE AFFECTED AREA(S) (THIN LAYER) TWICE DAILY AS NEEDED FOR SKIN IRRITATION, Disp: 80 g, Rfl: 0    amitriptyline (ELAVIL) 25 mg tablet, Take 1 Tablet by mouth nightly., Disp: 90 Tablet, Rfl: 1    cyclobenzaprine (FLEXERIL) 10 mg tablet, Take 1 Tablet by mouth two (2) times daily as needed for Muscle Spasm(s). , Disp: 60 Tablet, Rfl: 0    cetirizine (ZYRTEC) 10 mg tablet, TAKE 1 TABLET BY MOUTH ONCE DAILY, Disp: 90 Tablet, Rfl: 1    venlafaxine-SR (EFFEXOR-XR) 150 mg capsule, TAKE 1 CAPSULE BY MOUTH EVERY DAY, Disp: 90 Capsule, Rfl: 1    aspirin delayed-release 81 mg tablet, TAKE 1 TABLET BY MOUTH ONCE DAILY, Disp: 90 Tablet, Rfl: 1    ondansetron (ZOFRAN ODT) 4 mg disintegrating tablet, Take 1 Tablet by mouth every eight (8) hours as needed for Nausea or Vomiting., Disp: 12 Tablet, Rfl: 0    fluticasone propionate (FLONASE) 50 mcg/actuation nasal spray, 2 sprays in each nostril once daily, Disp: 1 Each, Rfl: 3    ascorbic acid, vitamin C, (Vitamin C) 500 mg tablet, Take 500 mg by mouth daily. , Disp: , Rfl:     mv-min/iron/folic/calcium/vitK (WOMEN'S MULTIVITAMIN PO), Take  by mouth daily. , Disp: , Rfl:     atorvastatin (LIPITOR) 20 mg tablet, Take 1 Tab by mouth daily. (Patient taking differently: Take 20 mg by mouth nightly.), Disp: 30 Tab, Rfl: 2    albuterol (PROVENTIL HFA, VENTOLIN HFA, PROAIR HFA) 90 mcg/actuation inhaler, Take 2 Puffs by inhalation every four (4) hours as needed for Wheezing.  Indications: asthma attack, Disp: 1 Inhaler, Rfl: 3    Allergies   Allergen Reactions    Tramadol Itching       Past Surgical History:   Procedure Laterality Date    HX TUBAL LIGATION         Social History     Tobacco Use   Smoking Status Never Smoker   Smokeless Tobacco Never Used       Social History     Socioeconomic History    Marital status:    Tobacco Use    Smoking status: Never Smoker    Smokeless tobacco: Never Used   Vaping Use    Vaping Use: Never used   Substance and Sexual Activity    Alcohol use: No    Drug use: No    Sexual activity: Yes       Family History   Problem Relation Age of Onset    Cancer Mother     Diabetes Mother     Hypertension Mother     High Cholesterol Mother     Cancer Father     Diabetes Father     Hypertension Father     High Cholesterol Father     Cancer Maternal Grandmother     Diabetes Maternal Grandmother     Hypertension Maternal Grandmother     High Cholesterol Maternal Grandmother     Cancer Maternal Grandfather     Diabetes Maternal Grandfather     Hypertension Maternal Grandfather     High Cholesterol Maternal Grandfather     Hypertension Paternal Grandmother     High Cholesterol Paternal Grandmother     Diabetes Paternal Grandmother     Cancer Paternal Grandmother     High Cholesterol Paternal Grandfather     Hypertension Paternal Grandfather     Diabetes Paternal Grandfather     Cancer Paternal Grandfather        ROS:  Gen: denies fever or chills  Resp: denies dyspnea, cough, or wheezing  CV: denies chest pain, no pressure, or palpitations  Extremeties: denies edema, pallor, or cyanosis  Musculoskeletal: +chronic neck pain radiating to the right shoulder and down the right arm to the wrist and hand +chronic midline low back pain  Neuro: + numbness/tingling and weakness in both hands, denies dizziness  Skin: denies rash or new lesions  Psych: + anxiety, depression, and insomnia- stable    Objective:     Visit Vitals  /74 (BP 1 Location: Left arm)   Pulse 92   Temp 98.1 °F (36.7 °C)   Resp 20   Ht 5' 10\" (1.778 m)   Wt 185 lb 6.4 oz (84.1 kg)   SpO2 97%   BMI 26.60 kg/m²       General: Alert and oriented. No acute distress. Well nourished. HEENT :  Eyes: Sclera white, conjunctiva clear. PERRLA. Extra ocular movements intact. Neck: +TTP to cervical spine and paraspinal muscles, limited ROM due to pain  Lungs: Breathing even and unlabored. All lobes clear to auscultation bilaterally   Heart :RRR, S1 and S2 normal intensity, no extra heart sounds  Extremities: Non-edematous  Back: +TTP over the lumbar spine, no drop-offs palpated +limited ROM  Neuro: Cranial nerves grossly normal.  Sensory: Sensation intact to upper and lower extremities. Motor: +weak  strength bilaterally  Psych: Mood is pleasant, but she tends to exaggerate her pain and requests multiple controlled substances- wants a prescription for weight loss and ADD and anxiety. Dressed appropriately but with poor hygiene.   Skin: warm dry and intact    Assessment/ Plan:     Cervicalgia with radiculopathy  Start Meloxicam 15mg daily  May restart Gabapentin pending UDS today  Controlled substance agreement already on file  May cont Flexeril 10mg BID prn pain and spasms  She was advised to call spine specialist Dr Sahil Diamond to get a follow up appt  F/U 3 months    R wrist pain   Suspect carpal tunnel syndrome vs cervical radiculopathy   Xray of right wrist ordered  Start wearing wrist brace  Start Meloxicam 15mg daily  May restart Gabapentin pending UDS today  Controlled substance agreement already on file  F/U 3 months    Lumbar DDD  Start Meloxicam 15mg daily  May restart Gabapentin pending UDS today  Controlled substance agreement already on file  May cont Flexeril 10mg BID prn pain and spasms  She was advised to call spine specialist Dr Helen Mooney to get a follow up appt  F/U 3 months    Insomnia  Improved with Amitriptiline  Cont Amitriptiline         Verbal and written instructions (see AVS) provided.  Patient expresses understanding of diagnosis and treatment plan. Health Maintenance Due   Topic Date Due    COVID-19 Vaccine (1) Never done    Pneumococcal 0-64 years (1 - PCV) Never done    DTaP/Tdap/Td series (1 - Tdap) Never done    Cervical cancer screen  06/08/1995    Colorectal Cancer Screening Combo  Never done    Shingrix Vaccine Age 50> (1 of 2) Never done    Breast Cancer Screen Mammogram  02/22/2018               Nunu Titus.  Rodrigo De La Vega, NP

## 2022-07-13 NOTE — PROGRESS NOTES
1. Have you been to the ER, urgent care clinic since your last visit? Hospitalized since your last visit?no    2. Have you seen or consulted any other health care providers outside of the [address] [place] [address] since your last visit? Include any pap smears or colon screening.  no

## 2022-07-20 LAB — DRUGS UR: NORMAL

## 2022-07-21 DIAGNOSIS — M51.36 DDD (DEGENERATIVE DISC DISEASE), LUMBAR: ICD-10-CM

## 2022-07-21 DIAGNOSIS — M50.30 DDD (DEGENERATIVE DISC DISEASE), CERVICAL: Primary | ICD-10-CM

## 2022-07-21 RX ORDER — GABAPENTIN 600 MG/1
600 TABLET ORAL 2 TIMES DAILY
Qty: 60 TABLET | Refills: 2 | Status: SHIPPED | OUTPATIENT
Start: 2022-07-21

## 2022-08-20 DIAGNOSIS — L20.84 INTRINSIC ECZEMA: ICD-10-CM

## 2022-08-20 RX ORDER — TRIAMCINOLONE ACETONIDE 1 MG/G
OINTMENT TOPICAL
Qty: 80 G | Refills: 0 | Status: SHIPPED | OUTPATIENT
Start: 2022-08-20

## 2022-10-12 ENCOUNTER — OFFICE VISIT (OUTPATIENT)
Dept: FAMILY MEDICINE CLINIC | Age: 57
End: 2022-10-12
Payer: MEDICAID

## 2022-10-12 ENCOUNTER — HOSPITAL ENCOUNTER (OUTPATIENT)
Dept: GENERAL RADIOLOGY | Age: 57
Discharge: HOME | End: 2022-10-12
Payer: MEDICAID

## 2022-10-12 VITALS
OXYGEN SATURATION: 98 % | RESPIRATION RATE: 18 BRPM | BODY MASS INDEX: 27.49 KG/M2 | HEART RATE: 94 BPM | WEIGHT: 192 LBS | SYSTOLIC BLOOD PRESSURE: 129 MMHG | HEIGHT: 70 IN | DIASTOLIC BLOOD PRESSURE: 85 MMHG | TEMPERATURE: 97 F

## 2022-10-12 DIAGNOSIS — G89.29 CHRONIC LEFT HIP PAIN: ICD-10-CM

## 2022-10-12 DIAGNOSIS — G89.29 ENCOUNTER FOR CHRONIC PAIN MANAGEMENT: ICD-10-CM

## 2022-10-12 DIAGNOSIS — F51.04 PSYCHOPHYSIOLOGICAL INSOMNIA: ICD-10-CM
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
## 2022-10-12 DIAGNOSIS — F41.9 ANXIETY: ICD-10-CM

## 2022-10-12 DIAGNOSIS — G89.29 CHRONIC LEFT HIP PAIN: Primary | ICD-10-CM

## 2022-10-12 DIAGNOSIS — E04.9 NODULAR GOITER: ICD-10-CM

## 2022-10-12 DIAGNOSIS — M50.30 DDD (DEGENERATIVE DISC DISEASE), CERVICAL: ICD-10-CM

## 2022-10-12 DIAGNOSIS — G56.01 CARPAL TUNNEL SYNDROME ON RIGHT: ICD-10-CM

## 2022-10-12 DIAGNOSIS — M51.36 DDD (DEGENERATIVE DISC DISEASE), LUMBAR: ICD-10-CM
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
## 2022-10-12 DIAGNOSIS — M25.552 CHRONIC LEFT HIP PAIN: Primary | ICD-10-CM

## 2022-10-12 DIAGNOSIS — E55.9 VITAMIN D DEFICIENCY: ICD-10-CM

## 2022-10-12 DIAGNOSIS — M25.552 CHRONIC LEFT HIP PAIN: ICD-10-CM

## 2022-10-12 DIAGNOSIS — E78.5 HYPERLIPIDEMIA, UNSPECIFIED HYPERLIPIDEMIA TYPE: ICD-10-CM

## 2022-10-12 DIAGNOSIS — R55 SYNCOPE, UNSPECIFIED SYNCOPE TYPE: ICD-10-CM

## 2022-10-12 DIAGNOSIS — R42 DIZZINESS: ICD-10-CM

## 2022-10-12 DIAGNOSIS — K76.0 FATTY LIVER: ICD-10-CM

## 2022-10-12 PROBLEM — Z91.199 NON COMPLIANCE WITH MEDICAL TREATMENT: Status: ACTIVE | Noted: 2022-10-12

## 2022-10-12 LAB
COMMENT, HOLDF: NORMAL
SAMPLES BEING HELD,HOLD: NORMAL

## 2022-10-12 PROCEDURE — 73100 X-RAY EXAM OF WRIST: CPT

## 2022-10-12 PROCEDURE — 99214 OFFICE O/P EST MOD 30 MIN: CPT | Performed by: NURSE PRACTITIONER

## 2022-10-12 PROCEDURE — 72100 X-RAY EXAM L-S SPINE 2/3 VWS: CPT

## 2022-10-12 PROCEDURE — 73502 X-RAY EXAM HIP UNI 2-3 VIEWS: CPT

## 2022-10-12 RX ORDER — VENLAFAXINE HYDROCHLORIDE 150 MG/1
150 CAPSULE, EXTENDED RELEASE ORAL DAILY
Qty: 180 CAPSULE | Refills: 1 | Status: SHIPPED | OUTPATIENT
Start: 2022-10-12

## 2022-10-12 RX ORDER — CYCLOBENZAPRINE HCL 10 MG
10 TABLET ORAL
Qty: 60 TABLET | Refills: 5 | Status: SHIPPED | OUTPATIENT
Start: 2022-10-12

## 2022-10-12 NOTE — PROGRESS NOTES
Subjective:     CC: insomnia, chronic pain    Ailyn Velasquez is a 62 y.o. female with hx of non-compliance and drug-seeking behavior who presents today for a 3 month follow up for insomnia and chronic pain. Insomnia   Sleeping well with Amitriptyline 25mg qHS. She has had chronic dizziness and lightheadedness with falls for over a year now. She believes it is related to either her anxiety or her pain. I had referred her to cardiology in 3-2021 when she first reported them but she never made an appt. CT of the head was normal other than sinus disease. A carotid ultrasound was done- showed only mild stenosis in the right side, left side was normal. She does have chronic neck pain with radiculopathy. Her arms and hands will go numb and she will drop things. She also c/o pain in the right wrist. Wrist xray was ordered but never completed. Still bothering her. Her cervical spinal xray in 1-2022 only showed mild DDD. She was taking Flexeril prn but it was not enough. At the last OV she was started on Meloxicam 15mg daily and Gabapentin . Today she states she is still in pain, ran out of flexeril. Will restart. She was also advised to follow up with spine specialist Dr Roel Lo but never did. She also has chronic middle lumbar back pain. States it feels like \"hot needles stabbing her. \" Pain is intermittent and radiates down the buttocks and shoots up the thoracic spine. She is on flexeril 10mg BID but it does not adequately relieve the pain. She used to take Lyrica but stopped it due to weight gain. She had a lumbar MRI in 2019 that showed multi-level mild bulging discs without narrowing or impingement of the spinal canal. She saw  spine specialist Dr Marsha Ferreira who felt her pain was coming from the left hip which was later replaced. Recent fall at home  [place] forward with her arms out in front of her. Hurt her hips and now her lower back pain is worse. She would like xrays.  Has some tenderness to palpation over the anterior left hip. Goiter  She had a thyroid nodules ultrasound done in 4/2021 that showed multinodular goiter. She was referred to Endo but never scheduled an appt. Due for repeat ultrasound, ordered today. TSH has been normal. She is having trouble swallowing. She has 2 sisters with hx of thyroid cancer. Lab Results   Component Value Date/Time    TSH 1.33 03/15/2021 08:15 PM        GERD   She is on Omeprazole 20mg daily. Seasonal allergies  She is taking Zyrtec 10mg daily and Flonase nasal spray    Mild intermittent asthma  Uses Albuterol prn, did not mention any symptoms today    Anxiety and depression  Anxiety is worse, has been taking Effexor XR 150mg daily, will increase to 300mg daily. HLD  Lab Results   Component Value Date/Time    Cholesterol, total 238 (H) 06/13/2022 04:14 PM    HDL Cholesterol 40 06/13/2022 04:14 PM    LDL, calculated 157 (H) 06/13/2022 04:14 PM    VLDL, calculated 41 06/13/2022 04:14 PM    Triglyceride 205 (H) 06/13/2022 04:14 PM    CHOL/HDL Ratio 6.0 (H) 06/13/2022 04:14 PM     She was started on Lipitor 20mg daily after the last visit. Will recheck lipids today.      Vit D Def  She is taking a Vit D supp  Lab Results   Component Value Date/Time    Vitamin D 25-Hydroxy 15.7 (L) 06/13/2022 04:14 PM         Patient Active Problem List   Diagnosis Code    Anxiety F41.9    Lumbar spine scoliosis M41.9    Milk allergy Z91.011    Mild intermittent asthma without complication X75.16    Environmental and seasonal allergies J30.89    Psychophysiological insomnia F51.04    Acute midline low back pain with bilateral sciatica M54.42, M54.41    Osteoarthritis of left hip M16.12    Drug-seeking behavior Z76.5    DDD (degenerative disc disease), cervical M50.30    DDD (degenerative disc disease), lumbar M51.36    Hyperlipidemia E78.5    Osteopenia of multiple sites M85.89    Gastroesophageal reflux disease K21.9    Elevated ALT measurement R74.01    Fatty liver K76.0    Frequent falls R29.6    Nodular goiter E04.9    Carotid stenosis, right I65.21       Past Medical History:   Diagnosis Date    Asthma     H/O total hip arthroplasty 06/2021    LEFT HIP, Dr. Ginna Arredondo    Hx of migraines 1/18/2019    Psychiatric disorder     anxiety         Current Outpatient Medications:     Vitamin D2 1,250 mcg (50,000 unit) capsule, TAKE 1 CAPSULE BY MOUTH EVERY SEVEN DAYS, Disp: 12 Capsule, Rfl: 1    meloxicam (MOBIC) 15 mg tablet, TAKE 1 TABLET BY MOUTH ONCE DAILY WITH FOOD, Disp: 90 Tablet, Rfl: 1    venlafaxine-SR (EFFEXOR-XR) 150 mg capsule, TAKE 1 CAPSULE BY MOUTH EVERY DAY, Disp: 90 Capsule, Rfl: 1    aspirin delayed-release 81 mg tablet, TAKE 1 TABLET BY MOUTH ONCE DAILY, Disp: 90 Tablet, Rfl: 1    triamcinolone acetonide (KENALOG) 0.1 % ointment, APPLY A THIN LAYER TO AFFECTED AREA(S) TWICE DAILY AS NEEDED FOR SKIN IRRITATION, Disp: 80 g, Rfl: 0    gabapentin (NEURONTIN) 600 mg tablet, Take 1 Tablet by mouth two (2) times a day. Max Daily Amount: 1,200 mg., Disp: 60 Tablet, Rfl: 2    omeprazole (PRILOSEC) 20 mg capsule, TAKE 1 CAPSULE BY MOUTH ONCE DAILY, Disp: 90 Capsule, Rfl: 1    amitriptyline (ELAVIL) 25 mg tablet, Take 1 Tablet by mouth nightly., Disp: 90 Tablet, Rfl: 1    cyclobenzaprine (FLEXERIL) 10 mg tablet, Take 1 Tablet by mouth two (2) times daily as needed for Muscle Spasm(s). , Disp: 60 Tablet, Rfl: 0    cetirizine (ZYRTEC) 10 mg tablet, TAKE 1 TABLET BY MOUTH ONCE DAILY, Disp: 90 Tablet, Rfl: 1    ondansetron (ZOFRAN ODT) 4 mg disintegrating tablet, Take 1 Tablet by mouth every eight (8) hours as needed for Nausea or Vomiting., Disp: 12 Tablet, Rfl: 0    fluticasone propionate (FLONASE) 50 mcg/actuation nasal spray, 2 sprays in each nostril once daily, Disp: 1 Each, Rfl: 3    ascorbic acid, vitamin C, (Vitamin C) 500 mg tablet, Take 500 mg by mouth daily. , Disp: , Rfl:     mv-min/iron/folic/calcium/vitK (WOMEN'S MULTIVITAMIN PO), Take  by mouth daily. , Disp: , Rfl:     atorvastatin (LIPITOR) 20 mg tablet, Take 1 Tab by mouth daily. (Patient taking differently: Take 20 mg by mouth nightly.), Disp: 30 Tab, Rfl: 2    albuterol (PROVENTIL HFA, VENTOLIN HFA, PROAIR HFA) 90 mcg/actuation inhaler, Take 2 Puffs by inhalation every four (4) hours as needed for Wheezing. Indications: asthma attack, Disp: 1 Inhaler, Rfl: 3    Allergies   Allergen Reactions    Tramadol Itching       Past Surgical History:   Procedure Laterality Date    HX TUBAL LIGATION         Social History     Tobacco Use   Smoking Status Never   Smokeless Tobacco Never       Social History     Socioeconomic History    Marital status:    Tobacco Use    Smoking status: Never    Smokeless tobacco: Never   Vaping Use    Vaping Use: Never used   Substance and Sexual Activity    Alcohol use: No    Drug use: No    Sexual activity: Yes       Family History   Problem Relation Age of Onset    Cancer Mother     Diabetes Mother     Hypertension Mother     High Cholesterol Mother     Cancer Father     Diabetes Father     Hypertension Father     High Cholesterol Father     Cancer Maternal Grandmother     Diabetes Maternal Grandmother     Hypertension Maternal Grandmother     High Cholesterol Maternal Grandmother     Cancer Maternal Grandfather     Diabetes Maternal Grandfather     Hypertension Maternal Grandfather     High Cholesterol Maternal Grandfather     Hypertension Paternal Grandmother     High Cholesterol Paternal Grandmother     Diabetes Paternal Grandmother     Cancer Paternal Grandmother     High Cholesterol Paternal Grandfather     Hypertension Paternal Grandfather     Diabetes Paternal Grandfather     Cancer Paternal Grandfather        ROS:  Gen: denies fever or chills or fatigue.  +weight gain  Resp: denies dyspnea, cough, or wheezing  CV: denies chest pain, no pressure, or palpitations +syncope  Extremeties: denies edema, pallor, or cyanosis  Musculoskeletal: +new left hip pain, +chronic neck pain radiating to the right shoulder and down the right arm to the wrist and hand +chronic midline low back pain  Neuro: + numbness/tingling and weakness in both hands, +chronic dizziness with syncope, denies AMS or slurred speech  Skin: denies rash or new lesions  Psych: + anxiety, depression, and insomnia    Objective:     Visit Vitals  /85 (BP 1 Location: Left upper arm, BP Patient Position: At rest, BP Cuff Size: Adult)   Pulse 94   Temp 97 °F (36.1 °C) (Temporal)   Resp 18   Ht 5' 10\" (1.778 m)   Wt 192 lb (87.1 kg)   SpO2 98%   BMI 27.55 kg/m²     General: Alert and oriented. No acute distress. Well nourished. HEENT :  Eyes: Sclera white, conjunctiva clear. PERRLA. Extra ocular movements intact. Neck: +TTP to cervical spine and paraspinal muscles, limited ROM due to pain  Lungs: Breathing even and unlabored. All lobes clear to auscultation bilaterally   Heart :RRR, S1 and S2 normal intensity, no extra heart sounds  Extremities: Non-edematous  Back: +TTP over the lumbar spine, no drop-offs palpated +limited ROM  Musculoskeletal: +TTP over the anterior left hip, limited Rom due to pain  Neuro: Cranial nerves grossly normal.  Sensory: Sensation intact to upper and lower extremities. Motor: +weak  strength bilaterally  Psych: Mood is pleasant. Thought content appropriate. Dressed appropriately but with poor hygiene.   Skin: warm dry and intact    Assessment/ Plan:     Cervicalgia with radiculopathy  Restart Flexeril- refilled  Cont Meloxicam 15mg daily  Cont Gabapentin 600mg BID prn- NO REFILLS GIVEN TODAY UNTIL AFTER UDS COMPLETED  UDS today- states she cannot void  Controlled substance agreement already on file  She was AGAIN advised to call spine specialist Dr Jordy Resendez to get a follow up appt- she was given his office phone number to schedule an appt  F/U 3 months    R wrist pain   Suspect carpal tunnel syndrome vs cervical radiculopathy   Xray of right wrist ordered but never completed  Wrist brace  Cont Meloxicam 15mg daily  Cont Gabapentin   F/U 3 months    Lumbar DDD  Get lumbar xray today due to recent fall with worsening back pain  Restart Flexeril- refilled  Cont Meloxicam 15mg daily  Cont Gabapentin 600mg BID prn  UDS ordered today- states she cannot void  Controlled substance agreement already on file  She was AGAIN advised to call spine specialist Dr Sydni Solomon to get a follow up appt  F/U 3 months    Left hip pain r/t fall at home  Get left hip xray- will call with results    Dizziness with syncope  She was AGAIN referred to cardiology today (given office infor for CHARLY Garcia)  Referred to neuro- she was given office info for Dr Saralyn Riedel    Insomnia  Cont Amitriptyline    Goiter   Check TSH  Repeat thyroid ultrasound ordered    Anxiety and depression  Worsening anxiety  Increase to 300mg daily  Call for any problems  F/U 3 months    HLD  Check lipids  Cont LIPITOR  Low fat diet  Go to ER for CP or SOB  F/U 3 months    Vit D def  Check Vit D level  Cont Vit D supp    GERD  Cont PPI          Verbal and written instructions (see AVS) provided. Patient expresses understanding of diagnosis and treatment plan. Health Maintenance Due   Topic Date Due    COVID-19 Vaccine (1) Never done    Pneumococcal 0-64 years (1 - PCV) Never done    DTaP/Tdap/Td series (1 - Tdap) Never done    Cervical cancer screen  06/08/1986    Colorectal Cancer Screening Combo  Never done    Shingrix Vaccine Age 50> (1 of 2) Never done    Breast Cancer Screen Mammogram  02/22/2018    Flu Vaccine (1) 08/01/2022               3 [facility] URSULA Abdi NP

## 2022-10-12 NOTE — PROGRESS NOTES
1. \"Have you been to the ER, urgent care clinic since your last visit? Hospitalized since your last visit? \" No    2. \"Have you seen or consulted any other health care providers outside of the [address] [place] [address] since your last visit? \" No     3. For patients aged 39-70: Has the patient had a colonoscopy / FIT/ Cologuard? Yes - no Care Gap present      If the patient is female:    4. For patients aged 41-77: Has the patient had a mammogram within the past 2 years? No      5. For patients aged 21-65: Has the patient had a pap smear?  Yes - no Care Gap present

## 2022-10-13 ENCOUNTER — TELEPHONE (OUTPATIENT)
Dept: FAMILY MEDICINE CLINIC | Age: 57
End: 2022-10-13

## 2022-10-13 LAB
25(OH)D3 SERPL-MCNC: 33.3 NG/ML (ref 30–100)
ALBUMIN SERPL-MCNC: 3.9 G/DL (ref 3.5–5)
ALBUMIN/GLOB SERPL: 1.2 {RATIO} (ref 1.1–2.2)
ALP SERPL-CCNC: 84 U/L (ref 45–117)
ALT SERPL-CCNC: 33 U/L (ref 12–78)
ANION GAP SERPL CALC-SCNC: 6 MMOL/L (ref 5–15)
AST SERPL-CCNC: 17 U/L (ref 15–37)
BILIRUB SERPL-MCNC: 0.6 MG/DL (ref 0.2–1)
BUN SERPL-MCNC: 13 MG/DL (ref 6–20)
BUN/CREAT SERPL: 18 (ref 12–20)
CALCIUM SERPL-MCNC: 8.8 MG/DL (ref 8.5–10.1)
CHLORIDE SERPL-SCNC: 108 MMOL/L (ref 97–108)
CHOLEST SERPL-MCNC: 209 MG/DL
CO2 SERPL-SCNC: 25 MMOL/L (ref 21–32)
CREAT SERPL-MCNC: 0.72 MG/DL (ref 0.55–1.02)
GLOBULIN SER CALC-MCNC: 3.3 G/DL (ref 2–4)
GLUCOSE SERPL-MCNC: 107 MG/DL (ref 65–100)
HDLC SERPL-MCNC: 45 MG/DL
HDLC SERPL: 4.6 {RATIO} (ref 0–5)
LDLC SERPL CALC-MCNC: 145.2 MG/DL (ref 0–100)
POTASSIUM SERPL-SCNC: 3.9 MMOL/L (ref 3.5–5.1)
PROT SERPL-MCNC: 7.2 G/DL (ref 6.4–8.2)
SODIUM SERPL-SCNC: 139 MMOL/L (ref 136–145)
TRIGL SERPL-MCNC: 94 MG/DL (ref ?–150)
TSH SERPL DL<=0.05 MIU/L-ACNC: 0.34 UIU/ML (ref 0.36–3.74)
VLDLC SERPL CALC-MCNC: 18.8 MG/DL

## 2022-10-26 ENCOUNTER — TELEPHONE (OUTPATIENT)
Dept: FAMILY MEDICINE CLINIC | Age: 57
End: 2022-10-26

## 2022-10-26 NOTE — TELEPHONE ENCOUNTER
Just FYI patient is requesting refills on GABApentin but it will not be filled until she comes in to do her urine drug screen. She was seen earlier this month but was unable to void during the appt.

## 2023-01-12 ENCOUNTER — OFFICE VISIT (OUTPATIENT)
Dept: FAMILY MEDICINE CLINIC | Age: 58
End: 2023-01-12
Payer: MEDICAID

## 2023-01-12 VITALS
OXYGEN SATURATION: 97 % | DIASTOLIC BLOOD PRESSURE: 82 MMHG | WEIGHT: 202.6 LBS | TEMPERATURE: 98.7 F | HEIGHT: 70 IN | BODY MASS INDEX: 29.01 KG/M2 | HEART RATE: 101 BPM | RESPIRATION RATE: 16 BRPM | SYSTOLIC BLOOD PRESSURE: 130 MMHG

## 2023-01-12 DIAGNOSIS — G56.01 CARPAL TUNNEL SYNDROME ON RIGHT: ICD-10-CM

## 2023-01-12 DIAGNOSIS — M51.36 DDD (DEGENERATIVE DISC DISEASE), LUMBAR: ICD-10-CM

## 2023-01-12 DIAGNOSIS — R55 SYNCOPE, UNSPECIFIED SYNCOPE TYPE: ICD-10-CM

## 2023-01-12 DIAGNOSIS — Z12.11 SCREENING FOR COLON CANCER: ICD-10-CM

## 2023-01-12 DIAGNOSIS — F41.9 ANXIETY: Primary | ICD-10-CM

## 2023-01-12 DIAGNOSIS — R29.6 FREQUENT FALLS: ICD-10-CM

## 2023-01-12 DIAGNOSIS — E04.9 NODULAR GOITER: ICD-10-CM

## 2023-01-12 DIAGNOSIS — Z12.31 ENCOUNTER FOR SCREENING MAMMOGRAM FOR MALIGNANT NEOPLASM OF BREAST: ICD-10-CM

## 2023-01-12 PROCEDURE — 99214 OFFICE O/P EST MOD 30 MIN: CPT | Performed by: NURSE PRACTITIONER

## 2023-01-12 RX ORDER — MELOXICAM 15 MG/1
TABLET ORAL
Qty: 90 TABLET | Refills: 1 | Status: SHIPPED | OUTPATIENT
Start: 2023-01-12

## 2023-01-12 RX ORDER — FLUOXETINE HYDROCHLORIDE 20 MG/1
20 CAPSULE ORAL DAILY
Qty: 30 CAPSULE | Refills: 0 | Status: SHIPPED | OUTPATIENT
Start: 2023-01-12

## 2023-01-12 NOTE — PROGRESS NOTES
Subjective:     CC: anxiety and chronic pain    Lore Albarado is a 62 y.o. female with hx of non-compliance and drug-seeking behavior who presents today for a 3 month follow up for anxiety, chronic pain, and other chronic medical problems. Health maintenance  PAP- overdue, advised to schedule here another day  Mammo- due, order placed  Colonoscopy- declines, Cologuard ordered today  PNA vaccines- declines  Flu shot- declines  Shingrix- advised to get from pharmacy  Covid vaccine- declines    Anxiety and depression  At the last appt she reported worsening anxiety so her Effexor XR was increased from 150 to 300mg daily. Today she states this was not helpful. Will have her wean off and try Prozac. Insomnia   Sleeping well with Amitriptyline 25mg qHS. She has had chronic dizziness and lightheadedness with falls for over a year now. She believes it is related to either her anxiety or her pain. I referred her to cardiology in 3-2021 when she first reported the symptoms but she never made an appt. I referred her again at the last visit in  but she still has not made an appt. She was reminded to do so today and given the office info again. CT of the head was normal other than sinus disease. A carotid ultrasound was done- showed only mild stenosis in the right side, left side was normal.     She does have chronic neck pain with radiculopathy. Her arms and hands will go numb and she will drop things. Her cervical spinal xray in 1-2022 only showed mild DDD. She also c/o pain in the right wrist. Wrist xray was normal. Declines PT. Taking Meloxicam. Would like to see ortho for possible injection. She also has chronic middle lumbar back pain. States it feels like \"hot needles stabbing her. \" Pain is intermittent and radiates down the buttocks and shoots up the thoracic spine. She is on flexeril 10mg BID but it does not adequately relieve the pain.  She used to take Lyrica but stopped it due to weight gain. No longer taking Gabapentin, it made her sick. She had a lumbar MRI in 2019 that showed multi-level mild bulging discs without narrowing or impingement of the spinal canal. She saw  spine specialist Dr Sydni Solomon who felt her pain was coming from the left hip which was later replaced. Today she states she needs something stronger for pain. Will refer to pain management Dr Елена Rausch. Goiter  She had a thyroid ultrasound done in 4/2021 that showed multinodular goiter. She was referred to Endo but never scheduled an appt. _Repeat ultrasound was ordered at the last appt  but this has not been completed. She has 2 sisters with hx of thyroid cancer. No abnormal weight loss, she has gained weight    Lab Results   Component Value Date/Time    TSH 0.34 (L) 10/12/2022 09:23 AM        GERD   She is on Omeprazole 20mg daily.      Seasonal allergies  She is taking Zyrtec 10mg daily and Flonase nasal spray    Mild intermittent asthma  Uses Albuterol prn, did not mention any symptoms today      Patient Active Problem List   Diagnosis Code    Anxiety F41.9    Lumbar spine scoliosis M41.9    Milk allergy Z91.011    Mild intermittent asthma without complication T14.21    Environmental and seasonal allergies J30.89    Psychophysiological insomnia F51.04    Acute midline low back pain with bilateral sciatica M54.42, M54.41    Osteoarthritis of left hip M16.12    Drug-seeking behavior Z76.5    DDD (degenerative disc disease), cervical M50.30    DDD (degenerative disc disease), lumbar M51.36    Hyperlipidemia E78.5    Osteopenia of multiple sites M85.89    Gastroesophageal reflux disease K21.9    Elevated ALT measurement R74.01    Fatty liver K76.0    Frequent falls R29.6    Nodular goiter E04.9    Carotid stenosis, right I65.21    Non compliance with medical treatment Z91.199       Past Medical History:   Diagnosis Date    Asthma     H/O total hip arthroplasty 06/2021    LEFT HIP, Dr. Jorge L Bateman    Hx of migraines 1/18/2019 Psychiatric disorder     anxiety         Current Outpatient Medications:     omeprazole (PRILOSEC) 20 mg capsule, TAKE 1 CAPSULE BY MOUTH ONCE DAILY, Disp: 90 Capsule, Rfl: 1    amitriptyline (ELAVIL) 25 mg tablet, TAKE 1 TABLET BY MOUTH EVERY EVENING, Disp: 90 Tablet, Rfl: 1    cetirizine (ZYRTEC) 10 mg tablet, TAKE 1 TABLET BY MOUTH ONCE DAILY, Disp: 90 Tablet, Rfl: 1    cyclobenzaprine (FLEXERIL) 10 mg tablet, Take 1 Tablet by mouth two (2) times daily as needed for Muscle Spasm(s). , Disp: 60 Tablet, Rfl: 5    venlafaxine-SR (EFFEXOR-XR) 150 mg capsule, Take 1 Capsule by mouth daily. Take 2 caps by mouth daily, Disp: 180 Capsule, Rfl: 1    Vitamin D2 1,250 mcg (50,000 unit) capsule, TAKE 1 CAPSULE BY MOUTH EVERY SEVEN DAYS, Disp: 12 Capsule, Rfl: 1    meloxicam (MOBIC) 15 mg tablet, TAKE 1 TABLET BY MOUTH ONCE DAILY WITH FOOD, Disp: 90 Tablet, Rfl: 1    aspirin delayed-release 81 mg tablet, TAKE 1 TABLET BY MOUTH ONCE DAILY, Disp: 90 Tablet, Rfl: 1    triamcinolone acetonide (KENALOG) 0.1 % ointment, APPLY A THIN LAYER TO AFFECTED AREA(S) TWICE DAILY AS NEEDED FOR SKIN IRRITATION, Disp: 80 g, Rfl: 0    gabapentin (NEURONTIN) 600 mg tablet, Take 1 Tablet by mouth two (2) times a day. Max Daily Amount: 1,200 mg., Disp: 60 Tablet, Rfl: 2    ondansetron (ZOFRAN ODT) 4 mg disintegrating tablet, Take 1 Tablet by mouth every eight (8) hours as needed for Nausea or Vomiting., Disp: 12 Tablet, Rfl: 0    fluticasone propionate (FLONASE) 50 mcg/actuation nasal spray, 2 sprays in each nostril once daily, Disp: 1 Each, Rfl: 3    ascorbic acid, vitamin C, (VITAMIN C) 500 mg tablet, Take 500 mg by mouth daily. , Disp: , Rfl:     mv-min/iron/folic/calcium/vitK (WOMEN'S MULTIVITAMIN PO), Take  by mouth daily. , Disp: , Rfl:     atorvastatin (LIPITOR) 20 mg tablet, Take 1 Tab by mouth daily.  (Patient taking differently: Take 20 mg by mouth nightly.), Disp: 30 Tab, Rfl: 2    albuterol (PROVENTIL HFA, VENTOLIN HFA, PROAIR HFA) 90 mcg/actuation inhaler, Take 2 Puffs by inhalation every four (4) hours as needed for Wheezing. Indications: asthma attack, Disp: 1 Inhaler, Rfl: 3    Allergies   Allergen Reactions    Tramadol Itching       Past Surgical History:   Procedure Laterality Date    HX TUBAL LIGATION         Social History     Tobacco Use   Smoking Status Never   Smokeless Tobacco Never       Social History     Socioeconomic History    Marital status:    Tobacco Use    Smoking status: Never    Smokeless tobacco: Never   Vaping Use    Vaping Use: Never used   Substance and Sexual Activity    Alcohol use: No    Drug use: No    Sexual activity: Yes       Family History   Problem Relation Age of Onset    Cancer Mother     Diabetes Mother     Hypertension Mother     High Cholesterol Mother     Cancer Father     Diabetes Father     Hypertension Father     High Cholesterol Father     Cancer Maternal Grandmother     Diabetes Maternal Grandmother     Hypertension Maternal Grandmother     High Cholesterol Maternal Grandmother     Cancer Maternal Grandfather     Diabetes Maternal Grandfather     Hypertension Maternal Grandfather     High Cholesterol Maternal Grandfather     Hypertension Paternal Grandmother     High Cholesterol Paternal Grandmother     Diabetes Paternal Grandmother     Cancer Paternal Grandmother     High Cholesterol Paternal Grandfather     Hypertension Paternal Grandfather     Diabetes Paternal Grandfather     Cancer Paternal Grandfather        ROS:  Gen: denies fever or chills or fatigue.  +weight gain  Resp: denies dyspnea, cough, or wheezing  CV: denies chest pain, no pressure, or palpitations +syncope  Extremeties: denies edema, pallor, or cyanosis  Musculoskeletal: +chronic left hip pain, +chronic neck pain radiating to the right shoulder and down the right arm to the wrist and hand +chronic midline low back pain  Neuro: + numbness/tingling and weakness in both hands, +chronic dizziness with syncope, denies AMS or slurred speech  Skin: denies rash or new lesions  Psych: + anxiety, depression, and insomnia    Objective:     Visit Vitals  /82 (BP 1 Location: Left upper arm)   Pulse (!) 101   Temp 98.7 °F (37.1 °C) (Oral)   Resp 16   Ht 5' 10\" (1.778 m)   Wt 202 lb 9.6 oz (91.9 kg)   SpO2 97%   BMI 29.07 kg/m²         General: Alert and oriented. No acute distress. Well nourished. HEENT :  Eyes: Sclera white, conjunctiva clear. PERRLA. Extra ocular movements intact. Neck: +TTP to cervical spine and paraspinal muscles, limited ROM due to pain  Lungs: Breathing even and unlabored. All lobes clear to auscultation bilaterally   Heart :RRR, S1 and S2 normal intensity, no extra heart sounds  Extremities: Non-edematous  Back: +TTP over the lumbar spine, no drop-offs palpated +limited ROM  Musculoskeletal: +TTP over the anterior left hip, limited Rom due to pain  R wrist: No edema, erythema, or heat. +TTP over radial aspect. Good ROM  Neuro: Cranial nerves grossly normal.  Sensory: Sensation intact to upper and lower extremities. Motor: +weak  strength bilaterally  Psych: Mood is pleasant. Thought content appropriate. Dressed appropriately but with poor hygiene.   Skin: warm dry and intact    Assessment/ Plan:     Anxiety and depression  Reports worsening anxiety  Wean off Effexor- take 1 tab daily x 1 week then stop  Start Prozac 20mg daily  Call for any problems  F/U 6 weeks    R wrist pain   Suspect carpal tunnel syndrome  Xray of right wrist normal  Has tried wearing wrist brace  Declines PT  Cont Meloxicam 15mg daily  Referred to ortho Dr Nat Fleming for possible injection    Lumbar DDD  Cont Flexeril prn pain  Cont Meloxicam 15mg daily  Off Gabapentin - makes her sick  She does not want to follow up with her spine specialist Dr Coral Mallory   Referred to pain management Dr Claude Mchugh with syncope  She was AGAIN referred to cardiology today (given office infor for NP Maria Eugenia Griffin)    Goiter   She has been non-compliant with scheduling an Endo appt and getting a repeat thyroid ultrasound    GERD  Cont PPI    ealth maintenance  PAP- overdue, advised to schedule here another day  Mammo- due, order placed  Colonoscopy- declines, Cologuard ordered today  PNA vaccines- declines  Flu shot- declines  Shingrix- advised to get from pharmacy  Covid vaccine- declines      Verbal and written instructions (see AVS) provided. Patient expresses understanding of diagnosis and treatment plan. Health Maintenance Due   Topic Date Due    COVID-19 Vaccine (1) Never done    Pneumococcal 0-64 years (1 - PCV) Never done    DTaP/Tdap/Td series (1 - Tdap) Never done    Cervical cancer screen  06/08/1986    Colorectal Cancer Screening Combo  Never done    Shingles Vaccine (1 of 2) Never done    Breast Cancer Screen Mammogram  02/22/2018    Flu Vaccine (1) 08/01/2022               Jeffry Azar, CHARLY

## 2023-01-13 ENCOUNTER — DOCUMENTATION ONLY (OUTPATIENT)
Dept: FAMILY MEDICINE CLINIC | Age: 58
End: 2023-01-13

## 2023-02-23 ENCOUNTER — OFFICE VISIT (OUTPATIENT)
Dept: FAMILY MEDICINE CLINIC | Age: 58
End: 2023-02-23

## 2023-02-23 DIAGNOSIS — T30.0 BURN: Primary | ICD-10-CM

## 2023-02-23 NOTE — PROGRESS NOTES
Subjective:     CC: anxiety and chronic pain    Lore Wheat is a 62 y.o. female with hx of non-compliance and drug-seeking behavior who presents today for a 3 month follow up for anxiety, chronic pain, and other chronic medical problems. Health maintenance  PAP- overdue, advised to schedule here another day  Mammo- due, order placed  Colonoscopy- declines, Cologuard ordered today  PNA vaccines- declines  Flu shot- declines  Shingrix- advised to get from pharmacy  Covid vaccine- declines    Anxiety and depression  At the last appt she reported worsening anxiety so her Effexor XR was increased from 150 to 300mg daily. Today she states this was not helpful. Will have her wean off and try Prozac. Insomnia   Sleeping well with Amitriptyline 25mg qHS. She has had chronic dizziness and lightheadedness with falls for over a year now. She believes it is related to either her anxiety or her pain. I referred her to cardiology in 3-2021 when she first reported the symptoms but she never made an appt. I referred her again at the last visit in  but she still has not made an appt. She was reminded to do so today and given the office info again. CT of the head was normal other than sinus disease. A carotid ultrasound was done- showed only mild stenosis in the right side, left side was normal.     She does have chronic neck pain with radiculopathy. Her arms and hands will go numb and she will drop things. Her cervical spinal xray in 1-2022 only showed mild DDD. She also c/o pain in the right wrist. Wrist xray was normal. Declines PT. Taking Meloxicam. Would like to see ortho for possible injection. She also has chronic middle lumbar back pain. States it feels like \"hot needles stabbing her. \" Pain is intermittent and radiates down the buttocks and shoots up the thoracic spine. She is on flexeril 10mg BID but it does not adequately relieve the pain.  She used to take Lyrica but stopped it due to weight gain. No longer taking Gabapentin, it made her sick. She had a lumbar MRI in 2019 that showed multi-level mild bulging discs without narrowing or impingement of the spinal canal. She saw  spine specialist Dr Geraldine Baird who felt her pain was coming from the left hip which was later replaced. Today she states she needs something stronger for pain. Will refer to pain management Dr Ely Masters. Goiter  She had a thyroid ultrasound done in 4/2021 that showed multinodular goiter. She was referred to Endo but never scheduled an appt. _Repeat ultrasound was ordered at the last appt  but this has not been completed. She has 2 sisters with hx of thyroid cancer. No abnormal weight loss, she has gained weight    Lab Results   Component Value Date/Time    TSH 0.34 (L) 10/12/2022 09:23 AM        GERD   She is on Omeprazole 20mg daily.      Seasonal allergies  She is taking Zyrtec 10mg daily and Flonase nasal spray    Mild intermittent asthma  Uses Albuterol prn, did not mention any symptoms today      Patient Active Problem List   Diagnosis Code    Anxiety F41.9    Lumbar spine scoliosis M41.9    Milk allergy Z91.011    Mild intermittent asthma without complication O41.61    Environmental and seasonal allergies J30.89    Psychophysiological insomnia F51.04    Acute midline low back pain with bilateral sciatica M54.42, M54.41    Osteoarthritis of left hip M16.12    Drug-seeking behavior Z76.5    DDD (degenerative disc disease), cervical M50.30    DDD (degenerative disc disease), lumbar M51.36    Hyperlipidemia E78.5    Osteopenia of multiple sites M85.89    Gastroesophageal reflux disease K21.9    Elevated ALT measurement R74.01    Fatty liver K76.0    Frequent falls R29.6    Nodular goiter E04.9    Carotid stenosis, right I65.21    Non compliance with medical treatment Z91.199       Past Medical History:   Diagnosis Date    Asthma     H/O total hip arthroplasty 06/2021    LEFT HIP, Dr. Richard Lou    Hx of migraines 1/18/2019 Psychiatric disorder     anxiety         Current Outpatient Medications:     FLUoxetine (PROzac) 20 mg capsule, TAKE 1 CAPSULE BY MOUTH ONCE DAILY, Disp: 90 Capsule, Rfl: 0    meloxicam (MOBIC) 15 mg tablet, TAKE 1 TABLET BY MOUTH ONCE DAILY WITH FOOD, Disp: 90 Tablet, Rfl: 1    omeprazole (PRILOSEC) 20 mg capsule, TAKE 1 CAPSULE BY MOUTH ONCE DAILY, Disp: 90 Capsule, Rfl: 1    amitriptyline (ELAVIL) 25 mg tablet, TAKE 1 TABLET BY MOUTH EVERY EVENING, Disp: 90 Tablet, Rfl: 1    cetirizine (ZYRTEC) 10 mg tablet, TAKE 1 TABLET BY MOUTH ONCE DAILY, Disp: 90 Tablet, Rfl: 1    cyclobenzaprine (FLEXERIL) 10 mg tablet, Take 1 Tablet by mouth two (2) times daily as needed for Muscle Spasm(s). , Disp: 60 Tablet, Rfl: 5    Vitamin D2 1,250 mcg (50,000 unit) capsule, TAKE 1 CAPSULE BY MOUTH EVERY SEVEN DAYS, Disp: 12 Capsule, Rfl: 1    aspirin delayed-release 81 mg tablet, TAKE 1 TABLET BY MOUTH ONCE DAILY, Disp: 90 Tablet, Rfl: 1    triamcinolone acetonide (KENALOG) 0.1 % ointment, APPLY A THIN LAYER TO AFFECTED AREA(S) TWICE DAILY AS NEEDED FOR SKIN IRRITATION, Disp: 80 g, Rfl: 0    ondansetron (ZOFRAN ODT) 4 mg disintegrating tablet, Take 1 Tablet by mouth every eight (8) hours as needed for Nausea or Vomiting., Disp: 12 Tablet, Rfl: 0    fluticasone propionate (FLONASE) 50 mcg/actuation nasal spray, 2 sprays in each nostril once daily, Disp: 1 Each, Rfl: 3    ascorbic acid, vitamin C, (VITAMIN C) 500 mg tablet, Take 500 mg by mouth daily. , Disp: , Rfl:     mv-min/iron/folic/calcium/vitK (WOMEN'S MULTIVITAMIN PO), Take  by mouth daily. , Disp: , Rfl:     atorvastatin (LIPITOR) 20 mg tablet, Take 1 Tab by mouth daily. (Patient taking differently: Take 20 mg by mouth nightly.), Disp: 30 Tab, Rfl: 2    albuterol (PROVENTIL HFA, VENTOLIN HFA, PROAIR HFA) 90 mcg/actuation inhaler, Take 2 Puffs by inhalation every four (4) hours as needed for Wheezing.  Indications: asthma attack, Disp: 1 Inhaler, Rfl: 3    Allergies   Allergen Reactions    Tramadol Itching       Past Surgical History:   Procedure Laterality Date    HX TUBAL LIGATION         Social History     Tobacco Use   Smoking Status Never   Smokeless Tobacco Never       Social History     Socioeconomic History    Marital status:    Tobacco Use    Smoking status: Never    Smokeless tobacco: Never   Vaping Use    Vaping Use: Never used   Substance and Sexual Activity    Alcohol use: No    Drug use: No    Sexual activity: Yes     Social Determinants of Health     Financial Resource Strain: Low Risk     Difficulty of Paying Living Expenses: Not hard at all   Food Insecurity: No Food Insecurity    Worried About Running Out of Food in the Last Year: Never true    Ran Out of Food in the Last Year: Never true       Family History   Problem Relation Age of Onset    Cancer Mother     Diabetes Mother     Hypertension Mother     High Cholesterol Mother     Cancer Father     Diabetes Father     Hypertension Father     High Cholesterol Father     Cancer Maternal Grandmother     Diabetes Maternal Grandmother     Hypertension Maternal Grandmother     High Cholesterol Maternal Grandmother     Cancer Maternal Grandfather     Diabetes Maternal Grandfather     Hypertension Maternal Grandfather     High Cholesterol Maternal Grandfather     Hypertension Paternal Grandmother     High Cholesterol Paternal Grandmother     Diabetes Paternal Grandmother     Cancer Paternal Grandmother     High Cholesterol Paternal Grandfather     Hypertension Paternal Grandfather     Diabetes Paternal Grandfather     Cancer Paternal Grandfather        ROS:  Gen: denies fever or chills or fatigue.  +weight gain  Resp: denies dyspnea, cough, or wheezing  CV: denies chest pain, no pressure, or palpitations +syncope  Extremeties: denies edema, pallor, or cyanosis  Musculoskeletal: +chronic left hip pain, +chronic neck pain radiating to the right shoulder and down the right arm to the wrist and hand +chronic midline low back pain  Neuro: + numbness/tingling and weakness in both hands, +chronic dizziness with syncope, denies AMS or slurred speech  Skin: denies rash or new lesions  Psych: + anxiety, depression, and insomnia    Objective: There were no vitals taken for this visit. General: Alert and oriented. No acute distress. Well nourished. HEENT :  Eyes: Sclera white, conjunctiva clear. PERRLA. Extra ocular movements intact. Neck: +TTP to cervical spine and paraspinal muscles, limited ROM due to pain  Lungs: Breathing even and unlabored. All lobes clear to auscultation bilaterally   Heart :RRR, S1 and S2 normal intensity, no extra heart sounds  Extremities: Non-edematous  Back: +TTP over the lumbar spine, no drop-offs palpated +limited ROM  Musculoskeletal: +TTP over the anterior left hip, limited Rom due to pain  R wrist: No edema, erythema, or heat. +TTP over radial aspect. Good ROM  Neuro: Cranial nerves grossly normal.  Sensory: Sensation intact to upper and lower extremities. Motor: +weak  strength bilaterally  Psych: Mood is pleasant. Thought content appropriate. Dressed appropriately but with poor hygiene.   Skin: warm dry and intact    Assessment/ Plan:     Anxiety and depression  Reports worsening anxiety  Wean off Effexor- take 1 tab daily x 1 week then stop  Start Prozac 20mg daily  Call for any problems  F/U 6 weeks    R wrist pain   Suspect carpal tunnel syndrome  Xray of right wrist normal  Has tried wearing wrist brace  Declines PT  Cont Meloxicam 15mg daily  Referred to ortho Dr Martines Never for possible injection    Lumbar DDD  Cont Flexeril prn pain  Cont Meloxicam 15mg daily  Off Gabapentin - \"makes her sick\"  She does not want to follow up with her spine specialist Dr June Langston   Referred to pain management Dr Elodia Alonzo with syncope  She was AGAIN referred to cardiology today (given office infor for NP Eileen Ashley)    Goiter   She has been non-compliant with scheduling an Endo appt and getting a repeat thyroid ultrasound    GERD  Cont PPI    ealth maintenance  PAP- overdue, advised to schedule here another day  Mammo- due, order placed  Colonoscopy- declines, Cologuard ordered today  PNA vaccines- declines  Flu shot- declines  Shingrix- advised to get from pharmacy  Covid vaccine- declines      Verbal and written instructions (see AVS) provided. Patient expresses understanding of diagnosis and treatment plan. Health Maintenance Due   Topic Date Due    COVID-19 Vaccine (1) Never done    DTaP/Tdap/Td series (1 - Tdap) Never done    Cervical cancer screen  06/08/1986    Colorectal Cancer Screening Combo  Never done    Shingles Vaccine (1 of 2) Never done    Breast Cancer Screen Mammogram  02/22/2018               Dayna Lewis.  Soy Whitney NP

## 2023-02-23 NOTE — PROGRESS NOTES
Identified pt with two pt identifiers(name and ). Reviewed record in preparation for visit and have obtained necessary documentation. Chief Complaint   Patient presents with    Burn     Right leg         1. \"Have you been to the ER, urgent care clinic since your last visit? Hospitalized since your last visit? \" Evangelina Scott    2. \"Have you seen or consulted any other health care providers outside of the [address] [place] [address] since your last visit? \" No     3. For patients aged 39-70: Has the patient had a colonoscopy / FIT/ Cologuard? Yes - no Care Gap present      If the patient is female:    4. For patients aged 41-77: Has the patient had a mammogram within the past 2 years? No      5. For patients aged 21-65: Has the patient had a pap smear?  No

## 2023-02-24 ENCOUNTER — OFFICE VISIT (OUTPATIENT)
Dept: FAMILY MEDICINE CLINIC | Age: 58
End: 2023-02-24
Payer: MEDICAID

## 2023-02-24 VITALS
HEIGHT: 70 IN | OXYGEN SATURATION: 99 % | SYSTOLIC BLOOD PRESSURE: 121 MMHG | WEIGHT: 202 LBS | RESPIRATION RATE: 16 BRPM | HEART RATE: 97 BPM | DIASTOLIC BLOOD PRESSURE: 71 MMHG | BODY MASS INDEX: 28.92 KG/M2 | TEMPERATURE: 99 F

## 2023-02-24 DIAGNOSIS — T24.301A: ICD-10-CM

## 2023-02-24 DIAGNOSIS — F41.9 ANXIETY: ICD-10-CM

## 2023-02-24 DIAGNOSIS — T24.302A: Primary | ICD-10-CM

## 2023-02-24 PROCEDURE — 99213 OFFICE O/P EST LOW 20 MIN: CPT | Performed by: NURSE PRACTITIONER

## 2023-02-24 RX ORDER — SILVER SULFADIAZINE 10 G/1000G
CREAM TOPICAL DAILY
Qty: 50 G | Refills: 0 | Status: SHIPPED | OUTPATIENT
Start: 2023-02-24

## 2023-02-24 RX ORDER — DOXYCYCLINE 100 MG/1
100 TABLET ORAL 2 TIMES DAILY
Qty: 20 TABLET | Refills: 0 | Status: SHIPPED | OUTPATIENT
Start: 2023-02-24 | End: 2023-03-06
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
## 2023-03-02 ENCOUNTER — HOSPITAL ENCOUNTER (OUTPATIENT)
Dept: WOUND CARE | Age: 58
Discharge: HOME | End: 2023-03-02
Payer: MEDICAID

## 2023-03-02 VITALS
RESPIRATION RATE: 18 BRPM | SYSTOLIC BLOOD PRESSURE: 143 MMHG | HEART RATE: 101 BPM | DIASTOLIC BLOOD PRESSURE: 77 MMHG | TEMPERATURE: 98.1 F

## 2023-03-02 DIAGNOSIS — T24.312A FULL THICKNESS BURN OF LEFT THIGH, INITIAL ENCOUNTER: ICD-10-CM

## 2023-03-02 DIAGNOSIS — T24.311A FULL THICKNESS BURN OF RIGHT THIGH, INITIAL ENCOUNTER: Primary | ICD-10-CM

## 2023-03-02 PROCEDURE — 99203 OFFICE O/P NEW LOW 30 MIN: CPT | Performed by: SURGERY

## 2023-03-02 PROCEDURE — 99213 OFFICE O/P EST LOW 20 MIN: CPT

## 2023-03-02 RX ORDER — MUPIROCIN 20 MG/G
OINTMENT TOPICAL ONCE
OUTPATIENT
Start: 2023-03-02 | End: 2023-03-02

## 2023-03-02 RX ORDER — LIDOCAINE HYDROCHLORIDE 40 MG/ML
SOLUTION TOPICAL ONCE
Status: CANCELLED | OUTPATIENT
Start: 2023-03-02 | End: 2023-03-02

## 2023-03-02 RX ORDER — LIDOCAINE HYDROCHLORIDE 20 MG/ML
JELLY TOPICAL ONCE
OUTPATIENT
Start: 2023-03-02 | End: 2023-03-02

## 2023-03-02 RX ORDER — BACITRACIN ZINC AND POLYMYXIN B SULFATE 500; 1000 [USP'U]/G; [USP'U]/G
OINTMENT TOPICAL ONCE
OUTPATIENT
Start: 2023-03-02 | End: 2023-03-02

## 2023-03-02 RX ORDER — LIDOCAINE 40 MG/G
CREAM TOPICAL ONCE
Status: CANCELLED | OUTPATIENT
Start: 2023-03-02 | End: 2023-03-02

## 2023-03-02 RX ORDER — GENTAMICIN SULFATE 1 MG/G
OINTMENT TOPICAL ONCE
Status: CANCELLED | OUTPATIENT
Start: 2023-03-02 | End: 2023-03-02

## 2023-03-02 RX ORDER — LIDOCAINE HYDROCHLORIDE 40 MG/ML
SOLUTION TOPICAL ONCE
OUTPATIENT
Start: 2023-03-02 | End: 2023-03-02

## 2023-03-02 RX ORDER — LIDOCAINE HYDROCHLORIDE 20 MG/ML
JELLY TOPICAL ONCE
Status: CANCELLED | OUTPATIENT
Start: 2023-03-02 | End: 2023-03-02

## 2023-03-02 RX ORDER — GENTAMICIN SULFATE 1 MG/G
OINTMENT TOPICAL ONCE
OUTPATIENT
Start: 2023-03-02 | End: 2023-03-02

## 2023-03-02 RX ORDER — CLOBETASOL PROPIONATE 0.5 MG/G
OINTMENT TOPICAL ONCE
OUTPATIENT
Start: 2023-03-02 | End: 2023-03-02

## 2023-03-02 RX ORDER — TRIAMCINOLONE ACETONIDE 1 MG/G
OINTMENT TOPICAL ONCE
OUTPATIENT
Start: 2023-03-02 | End: 2023-03-02

## 2023-03-02 RX ORDER — BACITRACIN 500 [USP'U]/G
OINTMENT TOPICAL ONCE
OUTPATIENT
Start: 2023-03-02 | End: 2023-03-02

## 2023-03-02 RX ORDER — LIDOCAINE 40 MG/G
CREAM TOPICAL ONCE
OUTPATIENT
Start: 2023-03-02 | End: 2023-03-02

## 2023-03-02 RX ORDER — BACITRACIN ZINC AND POLYMYXIN B SULFATE 500; 1000 [USP'U]/G; [USP'U]/G
OINTMENT TOPICAL ONCE
Status: CANCELLED | OUTPATIENT
Start: 2023-03-02 | End: 2023-03-02

## 2023-03-02 RX ORDER — SILVER SULFADIAZINE 10 G/1000G
CREAM TOPICAL ONCE
Status: CANCELLED | OUTPATIENT
Start: 2023-03-02 | End: 2023-03-02

## 2023-03-02 RX ORDER — MUPIROCIN 20 MG/G
OINTMENT TOPICAL ONCE
Status: CANCELLED | OUTPATIENT
Start: 2023-03-02 | End: 2023-03-02

## 2023-03-02 RX ORDER — SILVER SULFADIAZINE 10 G/1000G
CREAM TOPICAL ONCE
OUTPATIENT
Start: 2023-03-02 | End: 2023-03-02

## 2023-03-02 RX ORDER — BACITRACIN 500 [USP'U]/G
OINTMENT TOPICAL ONCE
Status: CANCELLED | OUTPATIENT
Start: 2023-03-02 | End: 2023-03-02

## 2023-03-02 RX ORDER — BETAMETHASONE DIPROPIONATE 0.5 MG/G
OINTMENT TOPICAL ONCE
Status: CANCELLED | OUTPATIENT
Start: 2023-03-02 | End: 2023-03-02

## 2023-03-02 RX ORDER — BETAMETHASONE DIPROPIONATE 0.5 MG/G
OINTMENT TOPICAL ONCE
OUTPATIENT
Start: 2023-03-02 | End: 2023-03-02

## 2023-03-02 RX ORDER — TRIAMCINOLONE ACETONIDE 1 MG/G
OINTMENT TOPICAL ONCE
Status: CANCELLED | OUTPATIENT
Start: 2023-03-02 | End: 2023-03-02

## 2023-03-02 RX ORDER — CLOBETASOL PROPIONATE 0.5 MG/G
OINTMENT TOPICAL ONCE
Status: CANCELLED | OUTPATIENT
Start: 2023-03-02 | End: 2023-03-02
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
## 2023-03-02 NOTE — DISCHARGE INSTRUCTIONS
Discharge Instructions/Wound Orders  [address], [address] [place], [place] [address]mala Allison [id number]  Telephone: [phone]    NAME:  [id number] Aleman [facility] [address]:  1965  MEDICAL RECORD NUMBER:  [id number]  DATE:  03/02/23  Wound Care Orders:  Bilateral Thigh wounds :Cleanse with saline or mild soap and water, apply primary dressing Vaseline gauze cover with secondary dressing   abd pad and roll gauze . Apply Ace banage  Pt./pcg/HH nurse to change (freq) every other day and as needed for compromise. F/U in 1 week. Dietary:  [] Diet as tolerated: [] Diabetic Diet:Low carb and no Sugar   [] No Added Salt:[] Increase Protein:   [] Other:Limit the amount of liquid you are drinking and avoid drinking in between meals   Activity:  [] Activity as tolerated:     Return Appointment:  [] Return Appointment: With DR Zofia Morales  in  [facility])  [] Ordered tests:   Electronically signed Tiffany Galindo RN on 3/2/2023 at 8:42 AM   [address] [place] [address] Information: Should you experience any significant changes in your wound(s) or have questions about your wound care, please contact the [address] [place] [address] at [address] [place] [address] 8:00 am - 4:30. If you need help with your wound outside these hours and cannot wait until we are again available, contact your PCP or go to the hospital emergency room. PLEASE NOTE: IF YOU ARE UNABLE TO OBTAIN WOUND SUPPLIES, CONTINUE TO USE THE SUPPLIES YOU HAVE AVAILABLE UNTIL YOU ARE ABLE TO REACH US. IT IS MOST IMPORTANT TO KEEP THE WOUND COVERED AT ALL TIMES.     Physician Signature:_______________________    Date: ___________ Time:  ____________
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
## 2023-03-10 ENCOUNTER — HOSPITAL ENCOUNTER (OUTPATIENT)
Dept: WOUND CARE | Age: 58
Discharge: HOME | End: 2023-03-10
Payer: MEDICAID

## 2023-03-10 VITALS
RESPIRATION RATE: 18 BRPM | HEART RATE: 79 BPM | TEMPERATURE: 97.8 F | DIASTOLIC BLOOD PRESSURE: 67 MMHG | SYSTOLIC BLOOD PRESSURE: 123 MMHG

## 2023-03-10 DIAGNOSIS — T24.312D FULL THICKNESS BURN OF LEFT THIGH, SUBSEQUENT ENCOUNTER: Primary | ICD-10-CM

## 2023-03-10 DIAGNOSIS — T24.311A FULL THICKNESS BURN OF RIGHT THIGH, INITIAL ENCOUNTER: ICD-10-CM

## 2023-03-10 DIAGNOSIS — T24.311D FULL THICKNESS BURN OF RIGHT THIGH, SUBSEQUENT ENCOUNTER: ICD-10-CM

## 2023-03-10 PROCEDURE — 99213 OFFICE O/P EST LOW 20 MIN: CPT

## 2023-03-10 PROCEDURE — 99213 OFFICE O/P EST LOW 20 MIN: CPT | Performed by: SURGERY

## 2023-03-10 RX ORDER — SILVER SULFADIAZINE 10 G/1000G
CREAM TOPICAL ONCE
OUTPATIENT
Start: 2023-03-10 | End: 2023-03-10

## 2023-03-10 RX ORDER — BACITRACIN 500 [USP'U]/G
OINTMENT TOPICAL ONCE
OUTPATIENT
Start: 2023-03-10 | End: 2023-03-10

## 2023-03-10 RX ORDER — LIDOCAINE HYDROCHLORIDE 20 MG/ML
JELLY TOPICAL ONCE
OUTPATIENT
Start: 2023-03-10 | End: 2023-03-10

## 2023-03-10 RX ORDER — CLOBETASOL PROPIONATE 0.5 MG/G
OINTMENT TOPICAL ONCE
OUTPATIENT
Start: 2023-03-10 | End: 2023-03-10

## 2023-03-10 RX ORDER — MUPIROCIN 20 MG/G
OINTMENT TOPICAL ONCE
OUTPATIENT
Start: 2023-03-10 | End: 2023-03-10

## 2023-03-10 RX ORDER — BACITRACIN ZINC AND POLYMYXIN B SULFATE 500; 1000 [USP'U]/G; [USP'U]/G
OINTMENT TOPICAL ONCE
OUTPATIENT
Start: 2023-03-10 | End: 2023-03-10

## 2023-03-10 RX ORDER — TRIAMCINOLONE ACETONIDE 1 MG/G
OINTMENT TOPICAL ONCE
OUTPATIENT
Start: 2023-03-10 | End: 2023-03-10

## 2023-03-10 RX ORDER — BETAMETHASONE DIPROPIONATE 0.5 MG/G
OINTMENT TOPICAL ONCE
OUTPATIENT
Start: 2023-03-10 | End: 2023-03-10

## 2023-03-10 RX ORDER — LIDOCAINE 40 MG/G
CREAM TOPICAL ONCE
OUTPATIENT
Start: 2023-03-10 | End: 2023-03-10

## 2023-03-10 RX ORDER — GENTAMICIN SULFATE 1 MG/G
OINTMENT TOPICAL ONCE
OUTPATIENT
Start: 2023-03-10 | End: 2023-03-10

## 2023-03-10 RX ORDER — LIDOCAINE HYDROCHLORIDE 40 MG/ML
SOLUTION TOPICAL ONCE
OUTPATIENT
Start: 2023-03-10 | End: 2023-03-10
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
## 2023-03-10 NOTE — WOUND CARE
03/10/23 1112   Wound Thigh Right   Date First Assessed/Time First Assessed: 03/02/23 0829   Wound Approximate Age at First Assessment (Weeks): 4 weeks  Primary Wound Type: Burn  Location: Thigh  Wound Location Orientation: Right   Wound Image     Wound Etiology Burn   Dressing Status Old drainage noted   Cleansed Soap and water   Dressing/Treatment Xeroform;ABD pad;Roll gauze; Ace wrap   Wound Length (cm) 22.8 cm   Wound Width (cm) 15 cm   Wound Depth (cm) 0.1 cm   Wound Surface Area (cm^2) 342 cm^2   Change in Wound Size % 2.29   Wound Volume (cm^3) 34.2 cm^3   Wound Healing % 2   Wound Assessment Pink/red   Drainage Amount Small   Drainage Description Serous   Wound Odor None   Adina-Wound/Incision Assessment Fragile   Edges Flat/open edges   Wound Thickness Description Full thickness   Wound Thigh Left   Date First Assessed/Time First Assessed: 03/02/23 0830   Wound Approximate Age at First Assessment (Weeks): 4 weeks  Primary Wound Type: Burn  Location: Thigh  Wound Location Orientation: Left   Wound Image    Wound Etiology Burn   Dressing Status Dry   Dressing/Treatment Open to air   Wound Length (cm) 0.1 cm   Wound Width (cm) 0.1 cm   Wound Depth (cm) 0.1 cm   Wound Surface Area (cm^2) 0.01 cm^2   Change in Wound Size % 100   Wound Volume (cm^3) 0.001 cm^3   Wound Healing % 100   Wound Assessment Epithelialization   Drainage Amount None   Wound Odor None   Adina-Wound/Incision Assessment Fragile   Pain 1   Pain Scale 1 Numeric (0 - 10)   Pain Intensity 1 8   Pain Location 1 Other (comment)  (brandon thighs)   Pain Description 1   (\"bee stings\")       Visit Vitals  /67   Pulse 79   Temp 97.8 °F (36.6 °C)   Resp 18

## 2023-03-10 NOTE — DISCHARGE INSTRUCTIONS
Discharge Instructions   [facility]  [address], [address] [facility]  [address] [place] [address] Ne, [address]  Telephone: [phone]    NAME:  [id number]  [facility] [address]:  1965  MEDICAL RECORD NUMBER:  [id number]  DATE:  03/10/23    WOUND CARE ORDERS:  Bilateral Thigh Wounds - Cleanse w/Mild soap & water or Normal Saline. Apply Xerform , ABD pads, Roll Gauze & lightly applied Ace Wraps (6\"). Dressings to be changed every other day, & as needed for compromise of dressings. Follow up w/Dr. Stacie Howe in 2 weeks. TREATMENT ORDERS:    Elevate leg(s) when sitting. Elevate legs above level of heart, when possible to aid in controlling edema/leg swelling   Avoid prolonged standing in one place. Do not get dressing/wrap wet. Follow Diet as prescribed:   [x] Diet as tolerated: [] Diabetic Diet: Low carb and no Sugar   [] No Added Salt  [] Increase Protein   [] Limit the amount of liquid you are drinking and avoid drinking in between meals     Return Appointment:  [x] Return Appointment: With DR Angela Ortega  in 2 [facility])     Electronically signed Gale Miller RN on 3/10/2023 at 11:30 AM     Lindsey Santiago 281: Should you experience any significant changes in your wound(s) or have questions about your wound care, please contact the [address] [place] [address] at [address] [place] [address] 8:00 am - 4:30. If you need help with your wound outside these hours and cannot wait until we are again available, contact your PCP or go to the hospital emergency room. PLEASE NOTE: IF YOU ARE UNABLE TO OBTAIN WOUND SUPPLIES, CONTINUE TO USE THE SUPPLIES YOU HAVE AVAILABLE UNTIL YOU ARE ABLE TO REACH US. IT IS MOST IMPORTANT TO KEEP THE WOUND COVERED AT ALL TIMES.      Physician Signature:_______________________    Date: ___________ Time:  ____________
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
## 2023-03-29 ENCOUNTER — OFFICE VISIT (OUTPATIENT)
Dept: FAMILY MEDICINE CLINIC | Age: 58
End: 2023-03-29
Payer: MEDICAID

## 2023-03-29 VITALS
SYSTOLIC BLOOD PRESSURE: 106 MMHG | HEART RATE: 81 BPM | BODY MASS INDEX: 27.92 KG/M2 | RESPIRATION RATE: 18 BRPM | DIASTOLIC BLOOD PRESSURE: 58 MMHG | OXYGEN SATURATION: 97 % | WEIGHT: 195 LBS | HEIGHT: 70 IN

## 2023-03-29 DIAGNOSIS — L20.84 INTRINSIC ECZEMA: ICD-10-CM

## 2023-03-29 DIAGNOSIS — T24.301A: Primary | ICD-10-CM

## 2023-03-29 DIAGNOSIS — J30.89 ENVIRONMENTAL AND SEASONAL ALLERGIES: ICD-10-CM

## 2023-03-29 PROCEDURE — 99213 OFFICE O/P EST LOW 20 MIN: CPT | Performed by: NURSE PRACTITIONER

## 2023-03-29 RX ORDER — FLUTICASONE PROPIONATE 50 MCG
SPRAY, SUSPENSION (ML) NASAL
Qty: 1 EACH | Refills: 3 | Status: SHIPPED | OUTPATIENT
Start: 2023-03-29

## 2023-03-29 RX ORDER — TRIAMCINOLONE ACETONIDE 1 MG/G
OINTMENT TOPICAL
Qty: 80 G | Refills: 0 | Status: SHIPPED | OUTPATIENT
Start: 2023-03-29

## 2023-03-29 NOTE — PROGRESS NOTES
1. \"Have you been to the ER, urgent care clinic since your last visit? Hospitalized since your last visit? \" No    2. \"Have you seen or consulted any other health care providers outside of the [address] [place] [address] since your last visit? \" No     3. For patients aged 39-70: Has the patient had a colonoscopy / FIT/ Cologuard? No      If the patient is female:    4. For patients aged 41-77: Has the patient had a mammogram within the past 2 years? Yes - no Care Gap present      5. For patients aged 21-65: Has the patient had a pap smear?  No

## 2023-03-31 ENCOUNTER — HOSPITAL ENCOUNTER (OUTPATIENT)
Dept: WOUND CARE | Age: 58
Discharge: HOME | End: 2023-03-31
Payer: MEDICAID

## 2023-03-31 VITALS
DIASTOLIC BLOOD PRESSURE: 61 MMHG | SYSTOLIC BLOOD PRESSURE: 115 MMHG | TEMPERATURE: 97.7 F | HEART RATE: 78 BPM | RESPIRATION RATE: 18 BRPM

## 2023-03-31 DIAGNOSIS — T24.311A FULL THICKNESS BURN OF RIGHT THIGH, INITIAL ENCOUNTER: Primary | ICD-10-CM

## 2023-03-31 DIAGNOSIS — T24.312D FULL THICKNESS BURN OF LEFT THIGH, SUBSEQUENT ENCOUNTER: ICD-10-CM

## 2023-03-31 PROCEDURE — 17250 CHEM CAUT OF GRANLTJ TISSUE: CPT

## 2023-03-31 PROCEDURE — 99213 OFFICE O/P EST LOW 20 MIN: CPT | Performed by: SURGERY

## 2023-03-31 RX ORDER — BACITRACIN 500 [USP'U]/G
OINTMENT TOPICAL ONCE
OUTPATIENT
Start: 2023-03-31 | End: 2023-03-31

## 2023-03-31 RX ORDER — LIDOCAINE HYDROCHLORIDE 20 MG/ML
JELLY TOPICAL ONCE
OUTPATIENT
Start: 2023-03-31 | End: 2023-03-31

## 2023-03-31 RX ORDER — SILVER SULFADIAZINE 10 G/1000G
CREAM TOPICAL ONCE
OUTPATIENT
Start: 2023-03-31 | End: 2023-03-31

## 2023-03-31 RX ORDER — CLOBETASOL PROPIONATE 0.5 MG/G
OINTMENT TOPICAL ONCE
OUTPATIENT
Start: 2023-03-31 | End: 2023-03-31

## 2023-03-31 RX ORDER — LIDOCAINE 40 MG/G
CREAM TOPICAL ONCE
OUTPATIENT
Start: 2023-03-31 | End: 2023-03-31

## 2023-03-31 RX ORDER — LIDOCAINE HYDROCHLORIDE 40 MG/ML
SOLUTION TOPICAL ONCE
OUTPATIENT
Start: 2023-03-31 | End: 2023-03-31

## 2023-03-31 RX ORDER — TRIAMCINOLONE ACETONIDE 1 MG/G
OINTMENT TOPICAL ONCE
OUTPATIENT
Start: 2023-03-31 | End: 2023-03-31

## 2023-03-31 RX ORDER — GENTAMICIN SULFATE 1 MG/G
OINTMENT TOPICAL ONCE
OUTPATIENT
Start: 2023-03-31 | End: 2023-03-31

## 2023-03-31 RX ORDER — MUPIROCIN 20 MG/G
OINTMENT TOPICAL ONCE
OUTPATIENT
Start: 2023-03-31 | End: 2023-03-31

## 2023-03-31 RX ORDER — BACITRACIN ZINC AND POLYMYXIN B SULFATE 500; 1000 [USP'U]/G; [USP'U]/G
OINTMENT TOPICAL ONCE
OUTPATIENT
Start: 2023-03-31 | End: 2023-03-31

## 2023-03-31 RX ORDER — BETAMETHASONE DIPROPIONATE 0.5 MG/G
OINTMENT TOPICAL ONCE
OUTPATIENT
Start: 2023-03-31 | End: 2023-03-31

## 2023-03-31 NOTE — WOUND CARE
03/31/23 1128   Wound Thigh Right   Date First Assessed/Time First Assessed: 03/02/23 0829   Wound Approximate Age at First Assessment (Weeks): 4 weeks  Primary Wound Type: Burn  Location: Thigh  Wound Location Orientation: Right   Wound Image    Wound Etiology Burn   Dressing Status Old drainage noted   Cleansed Soap and water   Wound Length (cm) 1 cm   Wound Width (cm) 0.5 cm   Wound Depth (cm) 0.1 cm   Wound Surface Area (cm^2) 0.5 cm^2   Change in Wound Size % 99.86   Wound Volume (cm^3) 0.05 cm^3   Wound Healing % 100   Wound Assessment Pink/red   Drainage Amount Small   Drainage Description Serous   Wound Odor None   Adina-Wound/Incision Assessment Fragile   Edges Flat/open edges   Wound Thickness Description Full thickness   Wound Thigh Left   Date First Assessed/Time First Assessed: 03/02/23 0830   Wound Approximate Age at First Assessment (Weeks): 4 weeks  Primary Wound Type: Burn  Location: Thigh  Wound Location Orientation: Left   Wound Image    Wound Etiology Burn   Wound Length (cm) 0 cm   Wound Width (cm) 0 cm   Wound Depth (cm) 0 cm   Wound Surface Area (cm^2) 0 cm^2   Change in Wound Size % 100   Wound Volume (cm^3) 0 cm^3   Wound Healing % 100   Visit Vitals  /61 (BP 1 Location: Left upper arm, BP Patient Position: At rest)   Pulse 78   Temp 97.7 °F (36.5 °C)   Resp 18
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
## 2023-04-20 DIAGNOSIS — J30.89 ENVIRONMENTAL AND SEASONAL ALLERGIES: ICD-10-CM

## 2023-04-21 RX ORDER — CETIRIZINE HYDROCHLORIDE 10 MG/1
TABLET ORAL
Qty: 90 TABLET | Refills: 1 | Status: SHIPPED | OUTPATIENT
Start: 2023-04-21

## 2023-05-22 RX ORDER — AMITRIPTYLINE HYDROCHLORIDE 25 MG/1
TABLET, FILM COATED ORAL
Qty: 90 TABLET | Refills: 1 | Status: SHIPPED | OUTPATIENT
Start: 2023-05-22

## 2023-06-22 RX ORDER — OMEPRAZOLE 20 MG/1
CAPSULE, DELAYED RELEASE ORAL
Qty: 90 CAPSULE | Refills: 1 | Status: SHIPPED | OUTPATIENT
Start: 2023-06-22

## 2023-06-22 NOTE — TELEPHONE ENCOUNTER
Office Visit on 10/12/2022   Component Date Value Ref Range Status    Vit D, 25-Hydroxy 10/12/2022 33.3  30 - 100 ng/mL Final    Comment: (NOTE)  Deficiency               <20 ng/mL  Insufficiency          20-30 ng/mL  Sufficient             ng/mL  Possible toxicity       >100 ng/mL    The Method used is Siemens Advia Centaur currently standardized to a   Center of Disease Control and Prevention (CDC) certified reference   [facility]. Samples containing fluorescein dye can produce falsely   elevated values when tested with the ADVIA Centaur Vitamin D Assay. It is recommended that results in the toxic range, >100 ng/mL, be   retested 72 hours post fluorescein exposure. Cholesterol, Total 10/12/2022 209 (H)  <200 MG/DL Final    Triglycerides 10/12/2022 94  <150 MG/DL Final    Based on NCEP-ATP III:  Triglycerides <150 mg/dL  is considered normal, 150-199 mg/dL  borderline high,  200-499 mg/dL high and  greater than or equal to 500 mg/dL very high. HDL 10/12/2022 45  MG/DL Final    Based on NCEP ATP III, HDL Cholesterol <40 mg/dL is considered low and >60 mg/dL is elevated. LDL Calculated 10/12/2022 145.2 (H)  0 - 100 MG/DL Final    Comment: Based on the NCEP-ATP: LDL-C concentrations are considered  optimal <100 mg/dL,  near optimal/above Normal 100-129 mg/dL  Borderline High: 130-159, High: 160-189 mg/dL  Very High: Greater than or equal to 190 mg/dL      VLDL Cholesterol Calculated 10/12/2022 18.8  MG/DL Final    Chol/HDL Ratio 10/12/2022 4.6  0.0 - 5.0   Final    TSH 10/12/2022 0.34 (L)  0.36 - 3.74 uIU/mL Final    Comment:   Due to TSH heterogeneity, both structurally and degree of glycosylation, monoclonal antibodies used in the TSH assay may not accurately quantitate TSH. Therefore, this result should be correlated with clinical findings as well as with other assessments of thyroid function, e.g., free T4, free T3.       Sodium 10/12/2022 139  136 - 145 mmol/L Final    Potassium 10/12/2022 3.9  3.5

## 2023-06-28 ENCOUNTER — OFFICE VISIT (OUTPATIENT)
Age: 58
End: 2023-06-28
Payer: MEDICAID

## 2023-06-28 VITALS
BODY MASS INDEX: 27.4 KG/M2 | TEMPERATURE: 97.1 F | WEIGHT: 191.4 LBS | HEART RATE: 77 BPM | HEIGHT: 70 IN | DIASTOLIC BLOOD PRESSURE: 68 MMHG | SYSTOLIC BLOOD PRESSURE: 102 MMHG

## 2023-06-28 DIAGNOSIS — G56.01 CARPAL TUNNEL SYNDROME, RIGHT UPPER LIMB: ICD-10-CM

## 2023-06-28 DIAGNOSIS — R42 DIZZINESS: ICD-10-CM

## 2023-06-28 DIAGNOSIS — E04.9 NODULAR GOITER: ICD-10-CM

## 2023-06-28 DIAGNOSIS — R41.840 POOR CONCENTRATION: ICD-10-CM

## 2023-06-28 DIAGNOSIS — Z91.199 NON COMPLIANCE WITH MEDICAL TREATMENT: ICD-10-CM

## 2023-06-28 DIAGNOSIS — Z87.828 HISTORY OF BURN, THIRD DEGREE: Primary | ICD-10-CM

## 2023-06-28 DIAGNOSIS — M51.36 DDD (DEGENERATIVE DISC DISEASE), LUMBAR: ICD-10-CM

## 2023-06-28 PROCEDURE — 99214 OFFICE O/P EST MOD 30 MIN: CPT | Performed by: NURSE PRACTITIONER

## 2023-06-28 PROCEDURE — 36415 COLL VENOUS BLD VENIPUNCTURE: CPT | Performed by: NURSE PRACTITIONER

## 2023-06-28 RX ORDER — CYCLOBENZAPRINE HCL 10 MG
10 TABLET ORAL 2 TIMES DAILY PRN
Qty: 60 TABLET | Refills: 5 | Status: SHIPPED | OUTPATIENT
Start: 2023-06-28

## 2023-06-28 SDOH — ECONOMIC STABILITY: FOOD INSECURITY: WITHIN THE PAST 12 MONTHS, YOU WORRIED THAT YOUR FOOD WOULD RUN OUT BEFORE YOU GOT MONEY TO BUY MORE.: NEVER TRUE

## 2023-06-28 SDOH — ECONOMIC STABILITY: HOUSING INSECURITY
IN THE LAST 12 MONTHS, WAS THERE A TIME WHEN YOU DID NOT HAVE A STEADY PLACE TO SLEEP OR SLEPT IN A SHELTER (INCLUDING NOW)?: NO

## 2023-06-28 SDOH — ECONOMIC STABILITY: FOOD INSECURITY: WITHIN THE PAST 12 MONTHS, THE FOOD YOU BOUGHT JUST DIDN'T LAST AND YOU DIDN'T HAVE MONEY TO GET MORE.: NEVER TRUE

## 2023-06-28 SDOH — ECONOMIC STABILITY: INCOME INSECURITY: HOW HARD IS IT FOR YOU TO PAY FOR THE VERY BASICS LIKE FOOD, HOUSING, MEDICAL CARE, AND HEATING?: NOT HARD AT ALL

## 2023-06-28 ASSESSMENT — PATIENT HEALTH QUESTIONNAIRE - PHQ9
SUM OF ALL RESPONSES TO PHQ QUESTIONS 1-9: 0
1. LITTLE INTEREST OR PLEASURE IN DOING THINGS: 0
SUM OF ALL RESPONSES TO PHQ QUESTIONS 1-9: 0
SUM OF ALL RESPONSES TO PHQ9 QUESTIONS 1 & 2: 0
2. FEELING DOWN, DEPRESSED OR HOPELESS: 0
SUM OF ALL RESPONSES TO PHQ QUESTIONS 1-9: 0
SUM OF ALL RESPONSES TO PHQ QUESTIONS 1-9: 0

## 2023-06-29 LAB
ALBUMIN SERPL-MCNC: 4.2 G/DL (ref 3.5–5)
ALBUMIN/GLOB SERPL: 1.3 (ref 1.1–2.2)
ALP SERPL-CCNC: 84 U/L (ref 45–117)
ALT SERPL-CCNC: 20 U/L (ref 12–78)
ANION GAP SERPL CALC-SCNC: 4 MMOL/L (ref 5–15)
AST SERPL-CCNC: 13 U/L (ref 15–37)
BASOPHILS # BLD: 0.1 K/UL (ref 0–0.1)
BASOPHILS NFR BLD: 1 % (ref 0–1)
BILIRUB SERPL-MCNC: 0.5 MG/DL (ref 0.2–1)
BUN SERPL-MCNC: 12 MG/DL (ref 6–20)
BUN/CREAT SERPL: 17 (ref 12–20)
CALCIUM SERPL-MCNC: 9.4 MG/DL (ref 8.5–10.1)
CHLORIDE SERPL-SCNC: 108 MMOL/L (ref 97–108)
CO2 SERPL-SCNC: 28 MMOL/L (ref 21–32)
CREAT SERPL-MCNC: 0.71 MG/DL (ref 0.55–1.02)
DIFFERENTIAL METHOD BLD: NORMAL
EOSINOPHIL # BLD: 0.3 K/UL (ref 0–0.4)
EOSINOPHIL NFR BLD: 5 % (ref 0–7)
ERYTHROCYTE [DISTWIDTH] IN BLOOD BY AUTOMATED COUNT: 12.7 % (ref 11.5–14.5)
GLOBULIN SER CALC-MCNC: 3.3 G/DL (ref 2–4)
GLUCOSE SERPL-MCNC: 96 MG/DL (ref 65–100)
HCT VFR BLD AUTO: 38.1 % (ref 35–47)
HGB BLD-MCNC: 12.1 G/DL (ref 11.5–16)
IMM GRANULOCYTES # BLD AUTO: 0 K/UL (ref 0–0.04)
IMM GRANULOCYTES NFR BLD AUTO: 0 % (ref 0–0.5)
LYMPHOCYTES # BLD: 2.1 K/UL (ref 0.8–3.5)
LYMPHOCYTES NFR BLD: 32 % (ref 12–49)
MCH RBC QN AUTO: 27.4 PG (ref 26–34)
MCHC RBC AUTO-ENTMCNC: 31.8 G/DL (ref 30–36.5)
MCV RBC AUTO: 86.4 FL (ref 80–99)
MONOCYTES # BLD: 0.4 K/UL (ref 0–1)
MONOCYTES NFR BLD: 6 % (ref 5–13)
NEUTS SEG # BLD: 3.6 K/UL (ref 1.8–8)
NEUTS SEG NFR BLD: 56 % (ref 32–75)
NRBC # BLD: 0 K/UL (ref 0–0.01)
NRBC BLD-RTO: 0 PER 100 WBC
PLATELET # BLD AUTO: 295 K/UL (ref 150–400)
PMV BLD AUTO: 12.3 FL (ref 8.9–12.9)
POTASSIUM SERPL-SCNC: 3.9 MMOL/L (ref 3.5–5.1)
PROT SERPL-MCNC: 7.5 G/DL (ref 6.4–8.2)
RBC # BLD AUTO: 4.41 M/UL (ref 3.8–5.2)
SODIUM SERPL-SCNC: 140 MMOL/L (ref 136–145)
TSH SERPL DL<=0.05 MIU/L-ACNC: 0.67 UIU/ML (ref 0.36–3.74)
WBC # BLD AUTO: 6.4 K/UL (ref 3.6–11)

## 2023-07-05 ENCOUNTER — HOSPITAL ENCOUNTER (OUTPATIENT)
Facility: HOSPITAL | Age: 58
Discharge: HOME | End: 2023-07-08
Payer: MEDICAID

## 2023-07-05 ENCOUNTER — CLINICAL DOCUMENTATION (OUTPATIENT)
Age: 58
End: 2023-07-05

## 2023-07-05 DIAGNOSIS — E04.9 NODULAR GOITER: ICD-10-CM

## 2023-07-05 PROCEDURE — 76536 US EXAM OF HEAD AND NECK: CPT

## 2023-07-07 ENCOUNTER — OFFICE VISIT (OUTPATIENT)
Age: 58
End: 2023-07-07
Payer: MEDICAID

## 2023-07-07 VITALS
WEIGHT: 185 LBS | RESPIRATION RATE: 16 BRPM | OXYGEN SATURATION: 97 % | DIASTOLIC BLOOD PRESSURE: 70 MMHG | HEIGHT: 70 IN | HEART RATE: 99 BPM | SYSTOLIC BLOOD PRESSURE: 90 MMHG | TEMPERATURE: 98.2 F | BODY MASS INDEX: 26.48 KG/M2

## 2023-07-07 DIAGNOSIS — R42 DIZZINESS: Primary | ICD-10-CM

## 2023-07-07 DIAGNOSIS — R29.6 FREQUENT FALLS: ICD-10-CM

## 2023-07-07 DIAGNOSIS — R55 SYNCOPE AND COLLAPSE: ICD-10-CM

## 2023-07-07 PROCEDURE — 93000 ELECTROCARDIOGRAM COMPLETE: CPT | Performed by: INTERNAL MEDICINE

## 2023-07-07 PROCEDURE — 99204 OFFICE O/P NEW MOD 45 MIN: CPT | Performed by: INTERNAL MEDICINE

## 2023-07-07 ASSESSMENT — PATIENT HEALTH QUESTIONNAIRE - PHQ9
SUM OF ALL RESPONSES TO PHQ QUESTIONS 1-9: 0
1. LITTLE INTEREST OR PLEASURE IN DOING THINGS: 0
SUM OF ALL RESPONSES TO PHQ9 QUESTIONS 1 & 2: 0
SUM OF ALL RESPONSES TO PHQ QUESTIONS 1-9: 0
SUM OF ALL RESPONSES TO PHQ QUESTIONS 1-9: 0
2. FEELING DOWN, DEPRESSED OR HOPELESS: 0
SUM OF ALL RESPONSES TO PHQ QUESTIONS 1-9: 0

## 2023-07-07 NOTE — PATIENT INSTRUCTIONS
Schedule an echocardiogram and 2-week mobile cardiac telemetry recording. Further recommendations will be provided pending the results. [de-identified] : FEVER, COUGH

## 2023-07-13 ENCOUNTER — TELEPHONE (OUTPATIENT)
Age: 58
End: 2023-07-13

## 2023-07-13 NOTE — TELEPHONE ENCOUNTER
Pt is calling because she canceled her apt today and would like a call back. Pt was having transportation problems.     [phone]

## 2023-08-21 DIAGNOSIS — E55.9 VITAMIN D DEFICIENCY, UNSPECIFIED: ICD-10-CM

## 2023-08-22 RX ORDER — ERGOCALCIFEROL 1.25 MG/1
CAPSULE ORAL
Qty: 4 CAPSULE | Refills: 0 | Status: SHIPPED | OUTPATIENT
Start: 2023-08-22 | End: 2023-09-30

## 2023-09-17 DIAGNOSIS — G56.01 CARPAL TUNNEL SYNDROME, RIGHT UPPER LIMB: ICD-10-CM

## 2023-09-17 DIAGNOSIS — M51.36 OTHER INTERVERTEBRAL DISC DEGENERATION, LUMBAR REGION: ICD-10-CM

## 2023-09-18 RX ORDER — MELOXICAM 15 MG/1
15 TABLET ORAL DAILY
Qty: 90 TABLET | Refills: 1 | Status: SHIPPED | OUTPATIENT
Start: 2023-09-18

## 2023-09-18 RX ORDER — ASPIRIN 81 MG/1
81 TABLET ORAL DAILY
Qty: 90 TABLET | Refills: 1 | Status: SHIPPED | OUTPATIENT
Start: 2023-09-18

## 2023-09-29 DIAGNOSIS — E55.9 VITAMIN D DEFICIENCY, UNSPECIFIED: ICD-10-CM

## 2023-09-29 NOTE — TELEPHONE ENCOUNTER
Patient requesting refill on     Requested Prescriptions     Pending Prescriptions Disp Refills    vitamin D (ERGOCALCIFEROL) 1.25 MG (30790 UT) CAPS capsule [Pharmacy Med Name: Vitamin D2 1,250 mcg (50,000 unit) capsule] 4 capsule 0     Sig: TAKE 1 CAPSULE BY MOUTH EVERY 7 DAYS        Last OV 6/28/2023

## 2023-09-30 RX ORDER — ERGOCALCIFEROL 1.25 MG/1
CAPSULE ORAL
Qty: 4 CAPSULE | Refills: 0 | Status: SHIPPED | OUTPATIENT
Start: 2023-09-30

## 2023-10-18 ENCOUNTER — OFFICE VISIT (OUTPATIENT)
Age: 58
End: 2023-10-18
Payer: MEDICAID

## 2023-10-18 VITALS
TEMPERATURE: 98 F | RESPIRATION RATE: 20 BRPM | SYSTOLIC BLOOD PRESSURE: 112 MMHG | HEART RATE: 94 BPM | HEIGHT: 70 IN | DIASTOLIC BLOOD PRESSURE: 78 MMHG | OXYGEN SATURATION: 96 % | BODY MASS INDEX: 27 KG/M2 | WEIGHT: 188.6 LBS

## 2023-10-18 DIAGNOSIS — R41.840 POOR CONCENTRATION: ICD-10-CM

## 2023-10-18 DIAGNOSIS — F51.04 PSYCHOPHYSIOLOGIC INSOMNIA: ICD-10-CM

## 2023-10-18 DIAGNOSIS — G57.93 NEUROPATHIC PAIN OF BOTH LEGS: Primary | ICD-10-CM

## 2023-10-18 DIAGNOSIS — M25.551 CHRONIC RIGHT HIP PAIN: ICD-10-CM

## 2023-10-18 DIAGNOSIS — M51.36 OTHER INTERVERTEBRAL DISC DEGENERATION, LUMBAR REGION: ICD-10-CM

## 2023-10-18 DIAGNOSIS — E04.9 NODULAR GOITER: ICD-10-CM

## 2023-10-18 DIAGNOSIS — F41.9 ANXIETY: ICD-10-CM

## 2023-10-18 DIAGNOSIS — G56.01 CARPAL TUNNEL SYNDROME, RIGHT UPPER LIMB: ICD-10-CM

## 2023-10-18 DIAGNOSIS — G89.29 CHRONIC RIGHT HIP PAIN: ICD-10-CM

## 2023-10-18 DIAGNOSIS — R42 DIZZINESS: ICD-10-CM

## 2023-10-18 PROCEDURE — 99214 OFFICE O/P EST MOD 30 MIN: CPT | Performed by: NURSE PRACTITIONER

## 2023-10-18 RX ORDER — PREDNISONE 10 MG/1
TABLET ORAL
Qty: 21 TABLET | Refills: 0 | Status: SHIPPED | OUTPATIENT
Start: 2023-10-18

## 2023-10-18 RX ORDER — MELOXICAM 15 MG/1
15 TABLET ORAL DAILY
Qty: 90 TABLET | Refills: 1 | Status: SHIPPED | OUTPATIENT
Start: 2023-10-18

## 2023-10-18 RX ORDER — AMITRIPTYLINE HYDROCHLORIDE 50 MG/1
50 TABLET, FILM COATED ORAL NIGHTLY
Qty: 90 TABLET | Refills: 0 | Status: SHIPPED | OUTPATIENT
Start: 2023-10-18

## 2023-10-18 ASSESSMENT — PATIENT HEALTH QUESTIONNAIRE - PHQ9
1. LITTLE INTEREST OR PLEASURE IN DOING THINGS: 0
SUM OF ALL RESPONSES TO PHQ QUESTIONS 1-9: 1
SUM OF ALL RESPONSES TO PHQ QUESTIONS 1-9: 1
SUM OF ALL RESPONSES TO PHQ9 QUESTIONS 1 & 2: 1
SUM OF ALL RESPONSES TO PHQ QUESTIONS 1-9: 1
SUM OF ALL RESPONSES TO PHQ QUESTIONS 1-9: 1
2. FEELING DOWN, DEPRESSED OR HOPELESS: 1
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
## 2023-10-19 DIAGNOSIS — J30.89 OTHER ALLERGIC RHINITIS: ICD-10-CM

## 2023-10-19 RX ORDER — FLUOXETINE HYDROCHLORIDE 20 MG/1
20 CAPSULE ORAL DAILY
Qty: 90 CAPSULE | Refills: 1 | Status: SHIPPED | OUTPATIENT
Start: 2023-10-19

## 2023-10-19 RX ORDER — CETIRIZINE HYDROCHLORIDE 10 MG/1
10 TABLET ORAL DAILY
Qty: 90 TABLET | Refills: 1 | Status: SHIPPED | OUTPATIENT
Start: 2023-10-19

## 2023-10-27 DIAGNOSIS — E55.9 VITAMIN D DEFICIENCY, UNSPECIFIED: ICD-10-CM

## 2023-10-27 NOTE — TELEPHONE ENCOUNTER
Patient requesting refill on     Requested Prescriptions     Pending Prescriptions Disp Refills    vitamin D (ERGOCALCIFEROL) 1.25 MG (29045 UT) CAPS capsule [Pharmacy Med Name: Vitamin D2 1,250 mcg (50,000 unit) capsule] 4 capsule 0     Sig: TAKE 1 CAPSULE BY MOUTH EVERY 7 DAYS        Last OV 10/18/2023

## 2023-10-29 RX ORDER — ERGOCALCIFEROL 1.25 MG/1
CAPSULE ORAL
Qty: 4 CAPSULE | Refills: 0 | Status: SHIPPED | OUTPATIENT
Start: 2023-10-29

## 2023-11-15 ENCOUNTER — TELEPHONE (OUTPATIENT)
Age: 58
End: 2023-11-15

## 2023-11-15 DIAGNOSIS — L20.84 INTRINSIC (ALLERGIC) ECZEMA: ICD-10-CM

## 2023-11-15 DIAGNOSIS — E55.9 VITAMIN D DEFICIENCY: Primary | ICD-10-CM

## 2023-11-16 RX ORDER — TRIAMCINOLONE ACETONIDE 1 MG/G
OINTMENT TOPICAL
Qty: 80 G | Refills: 0 | Status: SHIPPED | OUTPATIENT
Start: 2023-11-16

## 2023-11-16 NOTE — TELEPHONE ENCOUNTER
Patient requesting refill on     Requested Prescriptions     Pending Prescriptions Disp Refills    triamcinolone (KENALOG) 0.1 % ointment [Pharmacy Med Name: triamcinolone acetonide 0.1 % topical ointment] 80 g 0     Sig: APPLY A THIN LAYER TO AFFECTED AREA(S) TWICE DAILY AS NEEDED FOR SKIN IRRITATION        Last OV 10/18/2023

## 2023-11-28 DIAGNOSIS — E55.9 VITAMIN D DEFICIENCY, UNSPECIFIED: ICD-10-CM

## 2023-11-29 RX ORDER — ERGOCALCIFEROL 1.25 MG/1
CAPSULE ORAL
Qty: 4 CAPSULE | Refills: 0 | OUTPATIENT
Start: 2023-11-29

## 2023-11-29 NOTE — TELEPHONE ENCOUNTER
She needs to schedule an appt to check her Vit D level before I can refill any more vit D supplements.  We don't want Vit D to get too high or it can cause toxicity

## 2023-12-29 DIAGNOSIS — E55.9 VITAMIN D DEFICIENCY, UNSPECIFIED: ICD-10-CM

## 2023-12-31 RX ORDER — ERGOCALCIFEROL 1.25 MG/1
CAPSULE ORAL
Qty: 4 CAPSULE | Refills: 0 | OUTPATIENT
Start: 2023-12-31

## 2024-01-24 ENCOUNTER — OFFICE VISIT (OUTPATIENT)
Age: 59
End: 2024-01-24
Payer: MEDICAID

## 2024-01-24 VITALS
HEART RATE: 95 BPM | SYSTOLIC BLOOD PRESSURE: 104 MMHG | OXYGEN SATURATION: 96 % | TEMPERATURE: 98.2 F | BODY MASS INDEX: 28.03 KG/M2 | HEIGHT: 70 IN | WEIGHT: 195.8 LBS | DIASTOLIC BLOOD PRESSURE: 65 MMHG | RESPIRATION RATE: 20 BRPM

## 2024-01-24 DIAGNOSIS — G62.9 NEUROPATHY: Primary | ICD-10-CM

## 2024-01-24 DIAGNOSIS — M25.551 CHRONIC RIGHT HIP PAIN: ICD-10-CM

## 2024-01-24 DIAGNOSIS — M72.0 DUPUYTREN CONTRACTURE: ICD-10-CM

## 2024-01-24 DIAGNOSIS — E55.9 VITAMIN D DEFICIENCY: ICD-10-CM

## 2024-01-24 DIAGNOSIS — G56.01 CARPAL TUNNEL SYNDROME, RIGHT UPPER LIMB: ICD-10-CM

## 2024-01-24 DIAGNOSIS — Z91.199 NON COMPLIANCE WITH MEDICAL TREATMENT: ICD-10-CM

## 2024-01-24 DIAGNOSIS — E04.9 NODULAR GOITER: ICD-10-CM

## 2024-01-24 DIAGNOSIS — F51.04 PSYCHOPHYSIOLOGIC INSOMNIA: ICD-10-CM

## 2024-01-24 DIAGNOSIS — F41.9 ANXIETY: ICD-10-CM

## 2024-01-24 DIAGNOSIS — G89.29 CHRONIC RIGHT HIP PAIN: ICD-10-CM

## 2024-01-24 DIAGNOSIS — F33.1 MODERATE EPISODE OF RECURRENT MAJOR DEPRESSIVE DISORDER (HCC): ICD-10-CM

## 2024-01-24 DIAGNOSIS — M51.36 DDD (DEGENERATIVE DISC DISEASE), LUMBAR: ICD-10-CM

## 2024-01-24 DIAGNOSIS — R42 DIZZINESS: ICD-10-CM

## 2024-01-24 PROCEDURE — 99214 OFFICE O/P EST MOD 30 MIN: CPT | Performed by: NURSE PRACTITIONER

## 2024-01-24 PROCEDURE — 36415 COLL VENOUS BLD VENIPUNCTURE: CPT | Performed by: NURSE PRACTITIONER

## 2024-01-24 RX ORDER — TRAZODONE HYDROCHLORIDE 50 MG/1
50 TABLET ORAL NIGHTLY
COMMUNITY
Start: 2023-12-29

## 2024-01-24 RX ORDER — ARIPIPRAZOLE 5 MG/1
5 TABLET ORAL NIGHTLY
COMMUNITY
Start: 2023-12-29

## 2024-01-24 RX ORDER — BUSPIRONE HYDROCHLORIDE 5 MG/1
5 TABLET ORAL 2 TIMES DAILY
COMMUNITY
Start: 2023-12-29

## 2024-01-24 RX ORDER — BUPROPION HYDROCHLORIDE 150 MG/1
150 TABLET ORAL DAILY
COMMUNITY
Start: 2024-01-11

## 2024-01-24 SDOH — ECONOMIC STABILITY: FOOD INSECURITY: WITHIN THE PAST 12 MONTHS, THE FOOD YOU BOUGHT JUST DIDN'T LAST AND YOU DIDN'T HAVE MONEY TO GET MORE.: NEVER TRUE

## 2024-01-24 SDOH — ECONOMIC STABILITY: FOOD INSECURITY: WITHIN THE PAST 12 MONTHS, YOU WORRIED THAT YOUR FOOD WOULD RUN OUT BEFORE YOU GOT MONEY TO BUY MORE.: NEVER TRUE

## 2024-01-24 SDOH — ECONOMIC STABILITY: INCOME INSECURITY: HOW HARD IS IT FOR YOU TO PAY FOR THE VERY BASICS LIKE FOOD, HOUSING, MEDICAL CARE, AND HEATING?: NOT HARD AT ALL

## 2024-01-24 ASSESSMENT — ANXIETY QUESTIONNAIRES
2. NOT BEING ABLE TO STOP OR CONTROL WORRYING: 1
3. WORRYING TOO MUCH ABOUT DIFFERENT THINGS: 1
6. BECOMING EASILY ANNOYED OR IRRITABLE: 0
IF YOU CHECKED OFF ANY PROBLEMS ON THIS QUESTIONNAIRE, HOW DIFFICULT HAVE THESE PROBLEMS MADE IT FOR YOU TO DO YOUR WORK, TAKE CARE OF THINGS AT HOME, OR GET ALONG WITH OTHER PEOPLE: NOT DIFFICULT AT ALL
GAD7 TOTAL SCORE: 5
7. FEELING AFRAID AS IF SOMETHING AWFUL MIGHT HAPPEN: 0
4. TROUBLE RELAXING: 1
1. FEELING NERVOUS, ANXIOUS, OR ON EDGE: 1
5. BEING SO RESTLESS THAT IT IS HARD TO SIT STILL: 1

## 2024-01-24 ASSESSMENT — PATIENT HEALTH QUESTIONNAIRE - PHQ9
2. FEELING DOWN, DEPRESSED OR HOPELESS: 0
1. LITTLE INTEREST OR PLEASURE IN DOING THINGS: 0
SUM OF ALL RESPONSES TO PHQ9 QUESTIONS 1 & 2: 0
SUM OF ALL RESPONSES TO PHQ QUESTIONS 1-9: 0
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
## 2024-01-25 ENCOUNTER — CLINICAL DOCUMENTATION (OUTPATIENT)
Age: 59
End: 2024-01-25

## 2024-01-25 DIAGNOSIS — J30.89 OTHER ALLERGIC RHINITIS: ICD-10-CM

## 2024-01-25 LAB — 25(OH)D3 SERPL-MCNC: 32.3 NG/ML (ref 30–100)

## 2024-01-25 RX ORDER — ERGOCALCIFEROL 1.25 MG/1
50000 CAPSULE ORAL WEEKLY
Qty: 12 CAPSULE | Refills: 1 | Status: SHIPPED | OUTPATIENT
Start: 2024-01-25

## 2024-01-26 RX ORDER — METHOCARBAMOL 750 MG/1
750 TABLET, FILM COATED ORAL 2 TIMES DAILY PRN
Qty: 60 TABLET | Refills: 0 | Status: SHIPPED | OUTPATIENT
Start: 2024-01-26 | End: 2024-02-25

## 2024-01-30 ENCOUNTER — HOSPITAL ENCOUNTER (OUTPATIENT)
Facility: HOSPITAL | Age: 59
Discharge: HOME | End: 2024-02-02
Payer: MEDICAID

## 2024-01-30 DIAGNOSIS — M25.551 CHRONIC RIGHT HIP PAIN: ICD-10-CM

## 2024-01-30 DIAGNOSIS — G89.29 CHRONIC RIGHT HIP PAIN: ICD-10-CM

## 2024-01-30 DIAGNOSIS — M72.0 DUPUYTREN CONTRACTURE: ICD-10-CM

## 2024-01-30 PROCEDURE — 73502 X-RAY EXAM HIP UNI 2-3 VIEWS: CPT

## 2024-01-30 PROCEDURE — 73130 X-RAY EXAM OF HAND: CPT

## 2024-03-18 DIAGNOSIS — M25.551 CHRONIC RIGHT HIP PAIN: ICD-10-CM

## 2024-03-18 DIAGNOSIS — G89.29 CHRONIC RIGHT HIP PAIN: ICD-10-CM

## 2024-03-18 DIAGNOSIS — M51.36 OTHER INTERVERTEBRAL DISC DEGENERATION, LUMBAR REGION: ICD-10-CM

## 2024-03-18 DIAGNOSIS — G56.01 CARPAL TUNNEL SYNDROME, RIGHT UPPER LIMB: ICD-10-CM

## 2024-03-18 RX ORDER — ASPIRIN 81 MG/1
81 TABLET ORAL DAILY
Qty: 90 TABLET | Refills: 1 | Status: SHIPPED | OUTPATIENT
Start: 2024-03-18

## 2024-03-18 RX ORDER — MELOXICAM 15 MG/1
15 TABLET ORAL DAILY
Qty: 90 TABLET | Refills: 1 | Status: SHIPPED | OUTPATIENT
Start: 2024-03-18

## 2024-04-15 DIAGNOSIS — J30.89 OTHER ALLERGIC RHINITIS: ICD-10-CM

## 2024-04-15 RX ORDER — CETIRIZINE HYDROCHLORIDE 10 MG/1
10 TABLET ORAL DAILY
Qty: 90 TABLET | Refills: 1 | Status: SHIPPED | OUTPATIENT
Start: 2024-04-15

## 2024-04-16 RX ORDER — ALBUTEROL SULFATE 90 UG/1
2 AEROSOL, METERED RESPIRATORY (INHALATION) EVERY 4 HOURS PRN
Qty: 18 G | Refills: 0 | Status: SHIPPED | OUTPATIENT
Start: 2024-04-16

## 2024-06-13 RX ORDER — OMEPRAZOLE 20 MG/1
CAPSULE, DELAYED RELEASE ORAL
Qty: 90 CAPSULE | Refills: 1 | Status: SHIPPED | OUTPATIENT
Start: 2024-06-13

## 2024-06-13 RX ORDER — ERGOCALCIFEROL 1.25 MG/1
CAPSULE ORAL
Qty: 12 CAPSULE | Refills: 1 | Status: SHIPPED | OUTPATIENT
Start: 2024-06-13

## 2024-06-27 RX ORDER — BUPROPION HYDROCHLORIDE 150 MG/1
150 TABLET ORAL DAILY
Qty: 30 TABLET | Refills: 5 | Status: SHIPPED | OUTPATIENT
Start: 2024-06-27

## 2024-06-27 NOTE — TELEPHONE ENCOUNTER
Medication Refill Request    Latasha Durand is requesting a refill of the following medication(s):     Bupropion HCL  MG Tab    Please send refill to:     [facility] - [place], [place] - [address] [facility] [phone] - F [phone]  [address] [facility] [address]  Phone: [phone] Fax: [phone]

## 2024-07-08 ENCOUNTER — TELEPHONE (OUTPATIENT)
Age: 59
End: 2024-07-08

## 2024-07-08 NOTE — TELEPHONE ENCOUNTER
Called patient to inform appt scheduled with Dr Olguin has been cancelled and to offer an appt with Dr Mayo. LM requesting a call back to reschedule.

## 2024-09-09 DIAGNOSIS — M25.551 CHRONIC RIGHT HIP PAIN: ICD-10-CM

## 2024-09-09 DIAGNOSIS — M51.36 OTHER INTERVERTEBRAL DISC DEGENERATION, LUMBAR REGION: ICD-10-CM

## 2024-09-09 DIAGNOSIS — G56.01 CARPAL TUNNEL SYNDROME, RIGHT UPPER LIMB: ICD-10-CM

## 2024-09-09 DIAGNOSIS — G89.29 CHRONIC RIGHT HIP PAIN: ICD-10-CM

## 2024-09-09 RX ORDER — ASPIRIN 81 MG/1
81 TABLET, COATED ORAL DAILY
Qty: 90 TABLET | Refills: 1 | Status: SHIPPED | OUTPATIENT
Start: 2024-09-09

## 2024-09-09 RX ORDER — MELOXICAM 15 MG/1
15 TABLET ORAL DAILY
Qty: 30 TABLET | Refills: 0 | Status: SHIPPED | OUTPATIENT
Start: 2024-09-09

## 2024-10-10 DIAGNOSIS — J30.89 OTHER ALLERGIC RHINITIS: ICD-10-CM

## 2024-10-10 NOTE — TELEPHONE ENCOUNTER
Patient requesting refill on     Requested Prescriptions     Pending Prescriptions Disp Refills    cetirizine (ZYRTEC) 10 MG tablet [Pharmacy Med Name: cetirizine 10 mg tablet] 90 tablet 1     Sig: TAKE 1 TABLET BY MOUTH ONCE DAILY        Last OV 1/24/24

## 2024-10-11 RX ORDER — CETIRIZINE HYDROCHLORIDE 10 MG/1
10 TABLET ORAL DAILY
Qty: 90 TABLET | Refills: 1 | Status: SHIPPED | OUTPATIENT
Start: 2024-10-11

## 2024-10-22 DIAGNOSIS — M25.551 CHRONIC RIGHT HIP PAIN: ICD-10-CM

## 2024-10-22 DIAGNOSIS — G56.01 CARPAL TUNNEL SYNDROME, RIGHT UPPER LIMB: ICD-10-CM

## 2024-10-22 DIAGNOSIS — G89.29 CHRONIC RIGHT HIP PAIN: ICD-10-CM

## 2024-10-22 RX ORDER — MELOXICAM 15 MG/1
15 TABLET ORAL DAILY
Qty: 30 TABLET | Refills: 0 | OUTPATIENT
Start: 2024-10-22

## 2024-11-15 DIAGNOSIS — G56.01 CARPAL TUNNEL SYNDROME, RIGHT UPPER LIMB: ICD-10-CM

## 2024-11-15 DIAGNOSIS — G89.29 CHRONIC RIGHT HIP PAIN: ICD-10-CM

## 2024-11-15 DIAGNOSIS — M25.551 CHRONIC RIGHT HIP PAIN: ICD-10-CM

## 2024-11-18 RX ORDER — MELOXICAM 15 MG/1
15 TABLET ORAL DAILY
Qty: 30 TABLET | Refills: 0 | Status: SHIPPED | OUTPATIENT
Start: 2024-11-18

## 2024-11-27 ENCOUNTER — OFFICE VISIT (OUTPATIENT)
Age: 59
End: 2024-11-27
Payer: MEDICAID

## 2024-11-27 VITALS
HEIGHT: 70 IN | WEIGHT: 192 LBS | RESPIRATION RATE: 18 BRPM | SYSTOLIC BLOOD PRESSURE: 103 MMHG | HEART RATE: 84 BPM | BODY MASS INDEX: 27.49 KG/M2 | DIASTOLIC BLOOD PRESSURE: 71 MMHG | OXYGEN SATURATION: 98 % | TEMPERATURE: 97 F

## 2024-11-27 DIAGNOSIS — F33.1 MODERATE EPISODE OF RECURRENT MAJOR DEPRESSIVE DISORDER (HCC): ICD-10-CM

## 2024-11-27 DIAGNOSIS — E04.9 NODULAR GOITER: ICD-10-CM

## 2024-11-27 DIAGNOSIS — M51.362 DEGENERATION OF INTERVERTEBRAL DISC OF LUMBAR REGION WITH DISCOGENIC BACK PAIN AND LOWER EXTREMITY PAIN: Primary | ICD-10-CM

## 2024-11-27 DIAGNOSIS — G56.01 CARPAL TUNNEL SYNDROME, RIGHT UPPER LIMB: ICD-10-CM

## 2024-11-27 DIAGNOSIS — F41.9 ANXIETY: ICD-10-CM

## 2024-11-27 DIAGNOSIS — E78.00 PURE HYPERCHOLESTEROLEMIA: ICD-10-CM

## 2024-11-27 DIAGNOSIS — R55 SYNCOPE, UNSPECIFIED SYNCOPE TYPE: ICD-10-CM

## 2024-11-27 DIAGNOSIS — E55.9 VITAMIN D DEFICIENCY: ICD-10-CM

## 2024-11-27 DIAGNOSIS — R42 DIZZINESS: ICD-10-CM

## 2024-11-27 PROCEDURE — 36415 COLL VENOUS BLD VENIPUNCTURE: CPT | Performed by: NURSE PRACTITIONER

## 2024-11-27 PROCEDURE — 99214 OFFICE O/P EST MOD 30 MIN: CPT | Performed by: NURSE PRACTITIONER

## 2024-11-27 RX ORDER — QUETIAPINE FUMARATE 25 MG/1
25 TABLET, FILM COATED ORAL NIGHTLY
COMMUNITY
Start: 2024-11-09

## 2024-11-27 RX ORDER — PREGABALIN 50 MG/1
50 CAPSULE ORAL 2 TIMES DAILY
Qty: 60 CAPSULE | Refills: 0 | Status: SHIPPED | OUTPATIENT
Start: 2024-11-27 | End: 2024-12-27

## 2024-11-27 RX ORDER — METHYLPHENIDATE HYDROCHLORIDE 10 MG/1
TABLET ORAL
COMMUNITY
Start: 2024-10-10

## 2024-11-27 RX ORDER — BUSPIRONE HYDROCHLORIDE 5 MG/1
7 TABLET ORAL 2 TIMES DAILY
Qty: 60 TABLET | Refills: 0
Start: 2024-11-27
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
## 2024-11-28 LAB
25(OH)D3 SERPL-MCNC: 48 NG/ML (ref 30–100)
ALBUMIN SERPL-MCNC: 4.3 G/DL (ref 3.5–5)
ALBUMIN/GLOB SERPL: 1.2 (ref 1.1–2.2)
ALP SERPL-CCNC: 77 U/L (ref 45–117)
ALT SERPL-CCNC: 24 U/L (ref 12–78)
ANION GAP SERPL CALC-SCNC: 7 MMOL/L (ref 2–12)
AST SERPL-CCNC: 13 U/L (ref 15–37)
BASOPHILS # BLD: 0.1 K/UL (ref 0–0.1)
BASOPHILS NFR BLD: 1 % (ref 0–1)
BILIRUB SERPL-MCNC: 0.8 MG/DL (ref 0.2–1)
BUN SERPL-MCNC: 10 MG/DL (ref 6–20)
BUN/CREAT SERPL: 13 (ref 12–20)
CALCIUM SERPL-MCNC: 9.2 MG/DL (ref 8.5–10.1)
CHLORIDE SERPL-SCNC: 106 MMOL/L (ref 97–108)
CHOLEST SERPL-MCNC: 234 MG/DL
CO2 SERPL-SCNC: 25 MMOL/L (ref 21–32)
CREAT SERPL-MCNC: 0.75 MG/DL (ref 0.55–1.02)
DIFFERENTIAL METHOD BLD: NORMAL
EOSINOPHIL # BLD: 0.2 K/UL (ref 0–0.4)
EOSINOPHIL NFR BLD: 3 % (ref 0–7)
ERYTHROCYTE [DISTWIDTH] IN BLOOD BY AUTOMATED COUNT: 12.5 % (ref 11.5–14.5)
GLOBULIN SER CALC-MCNC: 3.5 G/DL (ref 2–4)
GLUCOSE SERPL-MCNC: 99 MG/DL (ref 65–100)
HCT VFR BLD AUTO: 38.6 % (ref 35–47)
HDLC SERPL-MCNC: 44 MG/DL
HDLC SERPL: 5.3 (ref 0–5)
HGB BLD-MCNC: 12.7 G/DL (ref 11.5–16)
IMM GRANULOCYTES # BLD AUTO: 0 K/UL (ref 0–0.04)
IMM GRANULOCYTES NFR BLD AUTO: 0 % (ref 0–0.5)
LDLC SERPL CALC-MCNC: 162.6 MG/DL (ref 0–100)
LYMPHOCYTES # BLD: 2.4 K/UL (ref 0.8–3.5)
LYMPHOCYTES NFR BLD: 30 % (ref 12–49)
MCH RBC QN AUTO: 28.3 PG (ref 26–34)
MCHC RBC AUTO-ENTMCNC: 32.9 G/DL (ref 30–36.5)
MCV RBC AUTO: 86.2 FL (ref 80–99)
MONOCYTES # BLD: 0.5 K/UL (ref 0–1)
MONOCYTES NFR BLD: 6 % (ref 5–13)
NEUTS SEG # BLD: 4.9 K/UL (ref 1.8–8)
NEUTS SEG NFR BLD: 60 % (ref 32–75)
NRBC # BLD: 0 K/UL (ref 0–0.01)
NRBC BLD-RTO: 0 PER 100 WBC
PLATELET # BLD AUTO: 290 K/UL (ref 150–400)
PMV BLD AUTO: 11.4 FL (ref 8.9–12.9)
POTASSIUM SERPL-SCNC: 3.5 MMOL/L (ref 3.5–5.1)
PROT SERPL-MCNC: 7.8 G/DL (ref 6.4–8.2)
RBC # BLD AUTO: 4.48 M/UL (ref 3.8–5.2)
SODIUM SERPL-SCNC: 138 MMOL/L (ref 136–145)
TRIGL SERPL-MCNC: 137 MG/DL
VLDLC SERPL CALC-MCNC: 27.4 MG/DL
WBC # BLD AUTO: 8 K/UL (ref 3.6–11)

## 2024-11-28 RX ORDER — ATORVASTATIN CALCIUM 20 MG/1
20 TABLET, FILM COATED ORAL DAILY
Qty: 90 TABLET | Refills: 1 | Status: SHIPPED | OUTPATIENT
Start: 2024-11-28

## 2024-12-09 RX ORDER — BUPROPION HYDROCHLORIDE 150 MG/1
150 TABLET ORAL DAILY
Qty: 30 TABLET | Refills: 5 | OUTPATIENT
Start: 2024-12-09

## 2024-12-09 RX ORDER — ALBUTEROL SULFATE 90 UG/1
2 INHALANT RESPIRATORY (INHALATION) EVERY 4 HOURS PRN
Qty: 18 G | Refills: 0 | Status: SHIPPED | OUTPATIENT
Start: 2024-12-09

## 2024-12-09 RX ORDER — ERGOCALCIFEROL 1.25 MG/1
CAPSULE, LIQUID FILLED ORAL
Qty: 12 CAPSULE | Refills: 1 | Status: SHIPPED | OUTPATIENT
Start: 2024-12-09

## 2024-12-09 NOTE — TELEPHONE ENCOUNTER
PT would like refills on:    albuterol sulfate HFA (PROVENTIL;VENTOLIN;PROAIR) 108 (90 Base) MCG/ACT inhaler [[id number]]     PT also wants a refill on her cream for eczema but does not have the tube  w/ name of it.    LOV  11/27/24    [facility] - [place], [place] - [address] [facility] [phone] - F [phone] [[phone]]

## 2024-12-09 NOTE — TELEPHONE ENCOUNTER
Patient requesting refill on     Requested Prescriptions     Pending Prescriptions Disp Refills    omeprazole (PRILOSEC) 20 MG delayed release capsule [Pharmacy Med Name: omeprazole 20 mg capsule,delayed release] 90 capsule 1     Sig: TAKE 1 CAPSULE BY MOUTH ONCE DAILY    buPROPion (WELLBUTRIN XL) 150 MG extended release tablet [Pharmacy Med Name: bupropion HCl  mg 24 hr tablet, extended release] 30 tablet 5     Sig: TAKE 1 TABLET BY MOUTH ONCE DAILY        Last OV 11/27/2024

## 2024-12-17 DIAGNOSIS — G56.01 CARPAL TUNNEL SYNDROME, RIGHT UPPER LIMB: ICD-10-CM

## 2024-12-17 DIAGNOSIS — M25.551 CHRONIC RIGHT HIP PAIN: ICD-10-CM

## 2024-12-17 DIAGNOSIS — G89.29 CHRONIC RIGHT HIP PAIN: ICD-10-CM

## 2024-12-17 RX ORDER — BUPROPION HYDROCHLORIDE 150 MG/1
150 TABLET ORAL DAILY
Qty: 30 TABLET | Refills: 5 | OUTPATIENT
Start: 2024-12-17

## 2024-12-17 RX ORDER — MELOXICAM 15 MG/1
15 TABLET ORAL DAILY
Qty: 90 TABLET | Refills: 1 | Status: SHIPPED | OUTPATIENT
Start: 2024-12-17

## 2024-12-17 NOTE — TELEPHONE ENCOUNTER
Patient requesting refill on     Requested Prescriptions     Pending Prescriptions Disp Refills    buPROPion (WELLBUTRIN XL) 150 MG extended release tablet [Pharmacy Med Name: bupropion HCl  mg 24 hr tablet, extended release] 30 tablet 5     Sig: TAKE 1 TABLET BY MOUTH ONCE DAILY    meloxicam (MOBIC) 15 MG tablet [Pharmacy Med Name: meloxicam 15 mg tablet] 30 tablet 0     Sig: TAKE 1 TABLET BY MOUTH ONCE DAILY WITH FOOD        Last OV 11/27/2024

## 2025-01-27 ENCOUNTER — TELEPHONE (OUTPATIENT)
Age: 60
End: 2025-01-27

## 2025-01-27 NOTE — TELEPHONE ENCOUNTER
----- Message from MARY MYERS LPN sent at 1/27/2025  2:46 PM EST -----  Regarding: FW: ECC Escalation To Practice    ----- Message -----  From: Nilo Albert  Sent: 1/27/2025   2:13 PM EST  To: #  Subject: ECC Escalation To Practice                       ECC Escalation To Practice      Type of Escalation: Acute Care Symptom  --------------------------------------------------------------------------------------------------------------------------    Information for Provider: patient is experiencing fluid in right ear and also experiencing back pain and throat hurts  Patient is looking for appointment for: Symptom fluid ear, throat hurts and back pain  Reasons for Message: Other doesn't have any available appointments    Additional Information patient wants to book an appointment for her fluid ear, back pains and her throat is in pain  --------------------------------------------------------------------------------------------------------------------------    Relationship to Patient: Self     Call Back Info: OK to leave message on voicemail  Preferred Call Back Number: Phone [phone]

## 2025-01-29 ENCOUNTER — OFFICE VISIT (OUTPATIENT)
Age: 60
End: 2025-01-29

## 2025-01-29 VITALS
SYSTOLIC BLOOD PRESSURE: 105 MMHG | DIASTOLIC BLOOD PRESSURE: 65 MMHG | TEMPERATURE: 98.1 F | WEIGHT: 186.6 LBS | HEART RATE: 87 BPM | BODY MASS INDEX: 26.71 KG/M2 | HEIGHT: 70 IN | RESPIRATION RATE: 18 BRPM | OXYGEN SATURATION: 97 %

## 2025-01-29 DIAGNOSIS — E78.00 PURE HYPERCHOLESTEROLEMIA: ICD-10-CM

## 2025-01-29 DIAGNOSIS — M51.362 DEGENERATION OF INTERVERTEBRAL DISC OF LUMBAR REGION WITH DISCOGENIC BACK PAIN AND LOWER EXTREMITY PAIN: ICD-10-CM

## 2025-01-29 DIAGNOSIS — J01.00 ACUTE NON-RECURRENT MAXILLARY SINUSITIS: Primary | ICD-10-CM

## 2025-01-29 LAB
CHOLEST SERPL-MCNC: 155 MG/DL
COMMENT:: NORMAL
HDLC SERPL-MCNC: 54 MG/DL
HDLC SERPL: 2.9 (ref 0–5)
LDLC SERPL CALC-MCNC: 83.6 MG/DL (ref 0–100)
SPECIMEN HOLD: NORMAL
TRIGL SERPL-MCNC: 87 MG/DL
VLDLC SERPL CALC-MCNC: 17.4 MG/DL

## 2025-01-29 RX ORDER — PREGABALIN 50 MG/1
50 CAPSULE ORAL 2 TIMES DAILY
Qty: 60 CAPSULE | Refills: 5 | Status: SHIPPED | OUTPATIENT
Start: 2025-01-29 | End: 2025-07-28

## 2025-01-29 SDOH — ECONOMIC STABILITY: FOOD INSECURITY: WITHIN THE PAST 12 MONTHS, THE FOOD YOU BOUGHT JUST DIDN'T LAST AND YOU DIDN'T HAVE MONEY TO GET MORE.: SOMETIMES TRUE

## 2025-01-29 SDOH — ECONOMIC STABILITY: FOOD INSECURITY: WITHIN THE PAST 12 MONTHS, YOU WORRIED THAT YOUR FOOD WOULD RUN OUT BEFORE YOU GOT MONEY TO BUY MORE.: SOMETIMES TRUE

## 2025-01-29 ASSESSMENT — PATIENT HEALTH QUESTIONNAIRE - PHQ9
1. LITTLE INTEREST OR PLEASURE IN DOING THINGS: NOT AT ALL
SUM OF ALL RESPONSES TO PHQ9 QUESTIONS 1 & 2: 0
10. IF YOU CHECKED OFF ANY PROBLEMS, HOW DIFFICULT HAVE THESE PROBLEMS MADE IT FOR YOU TO DO YOUR WORK, TAKE CARE OF THINGS AT HOME, OR GET ALONG WITH OTHER PEOPLE: SOMEWHAT DIFFICULT
SUM OF ALL RESPONSES TO PHQ QUESTIONS 1-9: 4
2. FEELING DOWN, DEPRESSED OR HOPELESS: NOT AT ALL
9. THOUGHTS THAT YOU WOULD BE BETTER OFF DEAD, OR OF HURTING YOURSELF: NOT AT ALL
8. MOVING OR SPEAKING SO SLOWLY THAT OTHER PEOPLE COULD HAVE NOTICED. OR THE OPPOSITE, BEING SO FIGETY OR RESTLESS THAT YOU HAVE BEEN MOVING AROUND A LOT MORE THAN USUAL: NOT AT ALL
6. FEELING BAD ABOUT YOURSELF - OR THAT YOU ARE A FAILURE OR HAVE LET YOURSELF OR YOUR FAMILY DOWN: NOT AT ALL
7. TROUBLE CONCENTRATING ON THINGS, SUCH AS READING THE NEWSPAPER OR WATCHING TELEVISION: NOT AT ALL
4. FEELING TIRED OR HAVING LITTLE ENERGY: SEVERAL DAYS
3. TROUBLE FALLING OR STAYING ASLEEP: NEARLY EVERY DAY
SUM OF ALL RESPONSES TO PHQ QUESTIONS 1-9: 4
SUM OF ALL RESPONSES TO PHQ QUESTIONS 1-9: 4
5. POOR APPETITE OR OVEREATING: NOT AT ALL
SUM OF ALL RESPONSES TO PHQ QUESTIONS 1-9: 4
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
## 2025-01-29 NOTE — PROGRESS NOTES
\"Have you been to the ER, urgent care clinic since your last visit?  Hospitalized since your last visit?\"    NO    “Have you seen or consulted any other health care providers outside of [facility] since your last visit?”    NO    “Have you had a colorectal cancer screening such as a colonoscopy/FIT/Cologuard?    NO    No colonoscopy on file  No cologuard on file  No FIT/FOBT on file   No flexible sigmoidoscopy on file        Have you had a mammogram?”   YES - Where: [facility] most recent dated below Nurse/CMA to request most recent records if not in the chart    Date of last Mammogram: 2/22/2016      “Have you had a pap smear?”    YES - Where: [place], [place] over 5 yrs, ? where Nurse/CMA to request most recent records if not in the chart    No cervical cancer screening on file     Chief Complaint   Patient presents with    Ear Pain     Rt and rt side of her throat       Vitals:    01/29/25 1438   BP: 105/65   Pulse: 87   Resp: 18   Temp: 98.1 °F (36.7 °C)   SpO2: 97%

## 2025-01-29 NOTE — PROGRESS NOTES
Labs drawn in right arm per Ashley Schmitt's orders.  Patient tolerated well, 1 SST, 1 lavender and 1 urine specimen send out.

## 2025-02-04 LAB
DRUG NAME: NORMAL
DRUGS UR: NORMAL
MED LIST NOT PROVIDED?: NO
MED LIST ON REQUISITION?: NO
MED LIST ON SEPARATE FORM?: NO
NO MEDICATION USE?: NO
RX NORM CODE: NORMAL
RX NORM SOURCE: NORMAL
RX NORM TEXT: NORMAL
SEQUENCE NUMBER: NORMAL

## 2025-03-03 ENCOUNTER — TELEPHONE (OUTPATIENT)
Age: 60
End: 2025-03-03

## 2025-03-03 RX ORDER — PREDNISONE 10 MG/1
TABLET ORAL
Qty: 21 TABLET | Refills: 0 | Status: SHIPPED | OUTPATIENT
Start: 2025-03-03

## 2025-03-03 NOTE — TELEPHONE ENCOUNTER
Pt was seen on 01/29/25. She states the face and ear pain are gone. She still can \"hear the ocean\" in her ear. Wants something call to [address].

## 2025-03-03 NOTE — TELEPHONE ENCOUNTER
We can try a round of prednisone. Script sent. If this does not work I would recommend she see ENT

## 2025-03-10 RX ORDER — ASPIRIN 81 MG/1
81 TABLET, COATED ORAL DAILY
Qty: 90 TABLET | Refills: 1 | Status: SHIPPED | OUTPATIENT
Start: 2025-03-10

## 2025-03-20 ENCOUNTER — TELEPHONE (OUTPATIENT)
Age: 60
End: 2025-03-20

## 2025-03-20 DIAGNOSIS — H91.93 BILATERAL CHANGE IN HEARING: Primary | ICD-10-CM

## 2025-03-20 NOTE — TELEPHONE ENCOUNTER
Pt is hearing the ocean in her right ear. No pain or drainage.  Can you call anything into [facility]?

## 2025-03-21 NOTE — TELEPHONE ENCOUNTER
She mentioned this at her last appointment in January and her ear looked fine on exam.  I am not sure what is causing this.  I can send her to an ear specialist if she would like. Recommend Dr. Monaco in [place].

## 2025-04-07 DIAGNOSIS — J30.89 OTHER ALLERGIC RHINITIS: ICD-10-CM

## 2025-04-07 RX ORDER — CETIRIZINE HYDROCHLORIDE 10 MG/1
10 TABLET ORAL DAILY
Qty: 90 TABLET | Refills: 1 | Status: SHIPPED | OUTPATIENT
Start: 2025-04-07

## 2025-04-07 NOTE — TELEPHONE ENCOUNTER
Patient requesting refill on     Requested Prescriptions     Pending Prescriptions Disp Refills    cetirizine (ZYRTEC) 10 MG tablet [Pharmacy Med Name: cetirizine 10 mg tablet] 90 tablet 1     Sig: TAKE 1 TABLET BY MOUTH ONCE DAILY        Last OV 1/29/2025

## 2025-04-09 ENCOUNTER — OFFICE VISIT (OUTPATIENT)
Age: 60
End: 2025-04-09
Payer: MEDICAID

## 2025-04-09 VITALS
WEIGHT: 191.2 LBS | TEMPERATURE: 98.5 F | OXYGEN SATURATION: 96 % | BODY MASS INDEX: 27.37 KG/M2 | SYSTOLIC BLOOD PRESSURE: 114 MMHG | RESPIRATION RATE: 18 BRPM | HEART RATE: 89 BPM | HEIGHT: 70 IN | DIASTOLIC BLOOD PRESSURE: 75 MMHG

## 2025-04-09 DIAGNOSIS — H69.91 DYSFUNCTION OF RIGHT EUSTACHIAN TUBE: Primary | ICD-10-CM

## 2025-04-09 DIAGNOSIS — Z87.828 HISTORY OF BURN, THIRD DEGREE: ICD-10-CM

## 2025-04-09 DIAGNOSIS — E55.9 VITAMIN D DEFICIENCY: ICD-10-CM

## 2025-04-09 DIAGNOSIS — M51.362 DEGENERATION OF INTERVERTEBRAL DISC OF LUMBAR REGION WITH DISCOGENIC BACK PAIN AND LOWER EXTREMITY PAIN: ICD-10-CM

## 2025-04-09 PROCEDURE — 36415 COLL VENOUS BLD VENIPUNCTURE: CPT | Performed by: NURSE PRACTITIONER

## 2025-04-09 PROCEDURE — 99214 OFFICE O/P EST MOD 30 MIN: CPT | Performed by: NURSE PRACTITIONER

## 2025-04-09 RX ORDER — PREGABALIN 50 MG/1
50-100 CAPSULE ORAL 2 TIMES DAILY
Qty: 180 CAPSULE | Refills: 0 | Status: SHIPPED | OUTPATIENT
Start: 2025-04-09 | End: 2025-07-08

## 2025-04-09 RX ORDER — LIDOCAINE 30 MG/G
CREAM TOPICAL
Qty: 28 G | Refills: 0 | Status: SHIPPED | OUTPATIENT
Start: 2025-04-09

## 2025-04-09 RX ORDER — MONTELUKAST SODIUM 10 MG/1
10 TABLET ORAL DAILY
Qty: 90 TABLET | Refills: 0 | Status: SHIPPED | OUTPATIENT
Start: 2025-04-09

## 2025-04-09 NOTE — PROGRESS NOTES
\"Have you been to the ER, urgent care clinic since your last visit?  Hospitalized since your last visit?\"    NO    “Have you seen or consulted any other health care providers outside of [facility] since your last visit?”    NO    Have you had a mammogram?”   NO    Date of last Mammogram: 2/22/2016      “Have you had a pap smear?”    NO    No cervical cancer screening on file         “Have you had a colorectal cancer screening such as a colonoscopy/FIT/Cologuard?    NO    No colonoscopy on file  No cologuard on file  No FIT/FOBT on file   No flexible sigmoidoscopy on file     Chief Complaint   Patient presents with    Lower Back Pain     Down into right leg      Asthma     Feels like acting up  And right ear nose / ocean sound       Chief Complaint   Patient presents with    Lower Back Pain     Down into right leg      Asthma     Feels like acting up  And right ear nose / ocean sound       Vitals:    04/09/25 1131   BP: 114/75   Pulse: 89   Resp: 18   Temp: 98.5 °F (36.9 °C)   SpO2: 96%     Click Here for Release of Records Request
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
## 2025-04-10 LAB
25(OH)D3 SERPL-MCNC: 69.9 NG/ML (ref 30–100)
ALBUMIN SERPL-MCNC: 4.1 G/DL (ref 3.5–5)
ALBUMIN/GLOB SERPL: 1.2 (ref 1.1–2.2)
ALP SERPL-CCNC: 94 U/L (ref 45–117)
ALT SERPL-CCNC: 26 U/L (ref 12–78)
ANION GAP SERPL CALC-SCNC: 7 MMOL/L (ref 2–12)
AST SERPL-CCNC: 17 U/L (ref 15–37)
BILIRUB SERPL-MCNC: 0.8 MG/DL (ref 0.2–1)
BUN SERPL-MCNC: 12 MG/DL (ref 6–20)
BUN/CREAT SERPL: 15 (ref 12–20)
CALCIUM SERPL-MCNC: 9.3 MG/DL (ref 8.5–10.1)
CHLORIDE SERPL-SCNC: 110 MMOL/L (ref 97–108)
CO2 SERPL-SCNC: 25 MMOL/L (ref 21–32)
CREAT SERPL-MCNC: 0.78 MG/DL (ref 0.55–1.02)
GLOBULIN SER CALC-MCNC: 3.4 G/DL (ref 2–4)
GLUCOSE SERPL-MCNC: 98 MG/DL (ref 65–100)
POTASSIUM SERPL-SCNC: 3.8 MMOL/L (ref 3.5–5.1)
PROT SERPL-MCNC: 7.5 G/DL (ref 6.4–8.2)
SODIUM SERPL-SCNC: 142 MMOL/L (ref 136–145)

## 2025-04-12 ENCOUNTER — RESULTS FOLLOW-UP (OUTPATIENT)
Age: 60
End: 2025-04-12

## 2025-04-12 RX ORDER — MULTIVIT-MIN/IRON/FOLIC ACID/K 18-600-40
1000 CAPSULE ORAL DAILY
COMMUNITY
Start: 2025-04-12

## 2025-04-19 RX ORDER — ERGOCALCIFEROL 1.25 MG/1
CAPSULE, LIQUID FILLED ORAL
Qty: 12 CAPSULE | Refills: 1 | OUTPATIENT
Start: 2025-04-19

## 2025-05-08 RX ORDER — ATORVASTATIN CALCIUM 20 MG/1
20 TABLET, FILM COATED ORAL DAILY
Qty: 90 TABLET | Refills: 1 | Status: SHIPPED | OUTPATIENT
Start: 2025-05-08

## 2025-05-08 NOTE — TELEPHONE ENCOUNTER
Patient requesting refill on     Requested Prescriptions     Pending Prescriptions Disp Refills    atorvastatin (LIPITOR) 20 MG tablet [Pharmacy Med Name: atorvastatin 20 mg tablet] 90 tablet 1     Sig: TAKE 1 TABLET BY MOUTH DAILY        Last OV 4/9/2025

## 2025-06-03 DIAGNOSIS — M51.362 DEGENERATION OF INTERVERTEBRAL DISC OF LUMBAR REGION WITH DISCOGENIC BACK PAIN AND LOWER EXTREMITY PAIN: ICD-10-CM

## 2025-06-05 DIAGNOSIS — M25.551 CHRONIC RIGHT HIP PAIN: ICD-10-CM

## 2025-06-05 DIAGNOSIS — G56.01 CARPAL TUNNEL SYNDROME, RIGHT UPPER LIMB: ICD-10-CM

## 2025-06-05 DIAGNOSIS — G89.29 CHRONIC RIGHT HIP PAIN: ICD-10-CM

## 2025-06-06 RX ORDER — PREGABALIN 50 MG/1
CAPSULE ORAL
Qty: 180 CAPSULE | Refills: 0 | Status: SHIPPED | OUTPATIENT
Start: 2025-07-01 | End: 2025-09-29

## 2025-06-06 RX ORDER — OMEPRAZOLE 20 MG/1
20 CAPSULE, DELAYED RELEASE ORAL DAILY
Qty: 90 CAPSULE | Refills: 1 | Status: SHIPPED | OUTPATIENT
Start: 2025-06-06

## 2025-06-06 RX ORDER — MELOXICAM 15 MG/1
15 TABLET ORAL DAILY
Qty: 90 TABLET | Refills: 1 | Status: SHIPPED | OUTPATIENT
Start: 2025-06-06

## 2025-07-02 RX ORDER — MONTELUKAST SODIUM 10 MG/1
10 TABLET ORAL DAILY
Qty: 90 TABLET | Refills: 0 | Status: SHIPPED | OUTPATIENT
Start: 2025-07-02

## 2025-07-02 NOTE — TELEPHONE ENCOUNTER
Patient requesting refill on     Requested Prescriptions     Pending Prescriptions Disp Refills    montelukast (SINGULAIR) 10 MG tablet [Pharmacy Med Name: montelukast 10 mg tablet] 90 tablet 0     Sig: TAKE 1 TABLET BY MOUTH ONCE DAILY        Last OV 4/9/2025

## 2025-07-08 DIAGNOSIS — M51.362 DEGENERATION OF INTERVERTEBRAL DISC OF LUMBAR REGION WITH DISCOGENIC BACK PAIN AND LOWER EXTREMITY PAIN: ICD-10-CM

## 2025-07-08 RX ORDER — PREGABALIN 50 MG/1
CAPSULE ORAL
Qty: 120 CAPSULE | Refills: 2 | Status: SHIPPED | OUTPATIENT
Start: 2025-07-08 | End: 2025-10-06

## 2025-09-04 RX ORDER — ASPIRIN 81 MG/1
81 TABLET ORAL DAILY
Qty: 90 TABLET | Refills: 1 | Status: SHIPPED | OUTPATIENT
Start: 2025-09-04

## (undated) DEVICE — SYR LR LCK 1ML GRAD NSAF 30ML --

## (undated) DEVICE — NEEDLE SPNL 20GA L3.5IN YEL HUB S STL REG WALL FIT STYL W/

## (undated) DEVICE — SOL IRRIGATION INJ NACL 0.9% 500ML BTL

## (undated) DEVICE — BLADE SAW 1.27X13X90 MM FOR LG BNE

## (undated) DEVICE — STERILE POLYISOPRENE POWDER-FREE SURGICAL GLOVES WITH EMOLLIENT COATING: Brand: PROTEXIS

## (undated) DEVICE — SUT VCRL + 1 36IN CT1 VIO --

## (undated) DEVICE — SUT VCRL + 2-0 27IN CT2 UD -- 36/BX

## (undated) DEVICE — STERILE POLYISOPRENE POWDER-FREE SURGICAL GLOVES: Brand: PROTEXIS

## (undated) DEVICE — GOWN,SIRUS,NONRNF,SETINSLV,XL,20/CS: Brand: MEDLINE

## (undated) DEVICE — PACK PROCEDURE SURG ANTR HIP

## (undated) DEVICE — HANDPIECE SET WITH HIGH FLOW TIP AND SUCTION TUBE: Brand: INTERPULSE

## (undated) DEVICE — SOL INJ L R 1000ML BG --

## (undated) DEVICE — TOWEL,OR,DSP,ST,BLUE,STD,4/PK,20PK/CS: Brand: MEDLINE

## (undated) DEVICE — SYSTEM SKIN CLSR 22CM DERMBND PRINEO

## (undated) DEVICE — SOL IRR STRL H2O 1500ML BTL --

## (undated) DEVICE — THE CANADY HYBRID PLASMA SCALPEL IS AN ELECTROSURGICAL PLASMA SCALPEL THAT USES AN 85MM BENDABLE PADDLE BLADE TIP. THE ELECTROSURGICAL PLASMA SCALPEL IS USED TO SIMULTANEOUSLY CUT AND COAGULATE BIOLOGICAL TISSUE.: Brand: CANADY HYBRID PLASMA PADDLE BLADE

## (undated) DEVICE — OPTIFOAM GENTLE SA, POSTOP, 4X8: Brand: MEDLINE

## (undated) DEVICE — BONE WAX WHITE: Brand: BONE WAX WHITE

## (undated) DEVICE — GARMENT,MEDLINE,DVT,INT,CALF,MED, GEN2: Brand: MEDLINE

## (undated) DEVICE — HOOD, PEEL-AWAY: Brand: FLYTE